# Patient Record
Sex: MALE | Race: BLACK OR AFRICAN AMERICAN | NOT HISPANIC OR LATINO | URBAN - METROPOLITAN AREA
[De-identification: names, ages, dates, MRNs, and addresses within clinical notes are randomized per-mention and may not be internally consistent; named-entity substitution may affect disease eponyms.]

---

## 2022-01-30 ENCOUNTER — EMERGENCY (EMERGENCY)
Facility: HOSPITAL | Age: 42
LOS: 0 days | Discharge: HOME | End: 2022-01-30
Attending: EMERGENCY MEDICINE
Payer: COMMERCIAL

## 2022-01-30 VITALS
OXYGEN SATURATION: 99 % | RESPIRATION RATE: 18 BRPM | TEMPERATURE: 98 F | SYSTOLIC BLOOD PRESSURE: 161 MMHG | WEIGHT: 160.06 LBS | DIASTOLIC BLOOD PRESSURE: 118 MMHG | HEART RATE: 107 BPM

## 2022-01-30 VITALS
SYSTOLIC BLOOD PRESSURE: 153 MMHG | DIASTOLIC BLOOD PRESSURE: 109 MMHG | OXYGEN SATURATION: 99 % | RESPIRATION RATE: 14 BRPM | HEART RATE: 96 BPM

## 2022-01-30 DIAGNOSIS — R56.9 UNSPECIFIED CONVULSIONS: ICD-10-CM

## 2022-01-30 DIAGNOSIS — R00.0 TACHYCARDIA, UNSPECIFIED: ICD-10-CM

## 2022-01-30 DIAGNOSIS — Z91.14 PATIENT'S OTHER NONCOMPLIANCE WITH MEDICATION REGIMEN: ICD-10-CM

## 2022-01-30 LAB
ALBUMIN SERPL ELPH-MCNC: 3.2 G/DL — LOW (ref 3.3–5)
ALP SERPL-CCNC: 121 U/L — HIGH (ref 40–120)
ALT FLD-CCNC: 54 U/L — SIGNIFICANT CHANGE UP (ref 12–78)
ANION GAP SERPL CALC-SCNC: 23 MMOL/L — HIGH (ref 5–17)
AST SERPL-CCNC: 163 U/L — HIGH (ref 15–37)
BASOPHILS # BLD AUTO: 0.02 K/UL — SIGNIFICANT CHANGE UP (ref 0–0.2)
BASOPHILS NFR BLD AUTO: 0.4 % — SIGNIFICANT CHANGE UP (ref 0–2)
BILIRUB SERPL-MCNC: 1 MG/DL — SIGNIFICANT CHANGE UP (ref 0.2–1.2)
BUN SERPL-MCNC: 8 MG/DL — SIGNIFICANT CHANGE UP (ref 7–23)
CALCIUM SERPL-MCNC: 9.2 MG/DL — SIGNIFICANT CHANGE UP (ref 8.5–10.1)
CHLORIDE SERPL-SCNC: 97 MMOL/L — SIGNIFICANT CHANGE UP (ref 96–108)
CO2 SERPL-SCNC: 15 MMOL/L — LOW (ref 22–31)
CREAT SERPL-MCNC: 1.78 MG/DL — HIGH (ref 0.5–1.3)
EOSINOPHIL # BLD AUTO: 0.03 K/UL — SIGNIFICANT CHANGE UP (ref 0–0.5)
EOSINOPHIL NFR BLD AUTO: 0.5 % — SIGNIFICANT CHANGE UP (ref 0–6)
GLUCOSE BLDC GLUCOMTR-MCNC: 211 MG/DL — HIGH (ref 70–99)
GLUCOSE SERPL-MCNC: 230 MG/DL — HIGH (ref 70–99)
HCT VFR BLD CALC: 37.2 % — LOW (ref 39–50)
HGB BLD-MCNC: 12.7 G/DL — LOW (ref 13–17)
IMM GRANULOCYTES NFR BLD AUTO: 0.2 % — SIGNIFICANT CHANGE UP (ref 0–1.5)
LYMPHOCYTES # BLD AUTO: 1.7 K/UL — SIGNIFICANT CHANGE UP (ref 1–3.3)
LYMPHOCYTES # BLD AUTO: 30.2 % — SIGNIFICANT CHANGE UP (ref 13–44)
MCHC RBC-ENTMCNC: 34.1 G/DL — SIGNIFICANT CHANGE UP (ref 32–36)
MCHC RBC-ENTMCNC: 34.1 PG — HIGH (ref 27–34)
MCV RBC AUTO: 100 FL — SIGNIFICANT CHANGE UP (ref 80–100)
MONOCYTES # BLD AUTO: 0.79 K/UL — SIGNIFICANT CHANGE UP (ref 0–0.9)
MONOCYTES NFR BLD AUTO: 14 % — SIGNIFICANT CHANGE UP (ref 2–14)
NEUTROPHILS # BLD AUTO: 3.08 K/UL — SIGNIFICANT CHANGE UP (ref 1.8–7.4)
NEUTROPHILS NFR BLD AUTO: 54.7 % — SIGNIFICANT CHANGE UP (ref 43–77)
NRBC # BLD: 0 /100 WBCS — SIGNIFICANT CHANGE UP (ref 0–0)
PLATELET # BLD AUTO: 36 K/UL — LOW (ref 150–400)
POTASSIUM SERPL-MCNC: 3.2 MMOL/L — LOW (ref 3.5–5.3)
POTASSIUM SERPL-SCNC: 3.2 MMOL/L — LOW (ref 3.5–5.3)
PROT SERPL-MCNC: 7.8 GM/DL — SIGNIFICANT CHANGE UP (ref 6–8.3)
RBC # BLD: 3.72 M/UL — LOW (ref 4.2–5.8)
RBC # FLD: 12.9 % — SIGNIFICANT CHANGE UP (ref 10.3–14.5)
SODIUM SERPL-SCNC: 135 MMOL/L — SIGNIFICANT CHANGE UP (ref 135–145)
WBC # BLD: 5.63 K/UL — SIGNIFICANT CHANGE UP (ref 3.8–10.5)
WBC # FLD AUTO: 5.63 K/UL — SIGNIFICANT CHANGE UP (ref 3.8–10.5)

## 2022-01-30 PROCEDURE — 70450 CT HEAD/BRAIN W/O DYE: CPT | Mod: 26,MA

## 2022-01-30 PROCEDURE — 99285 EMERGENCY DEPT VISIT HI MDM: CPT

## 2022-01-30 RX ORDER — POTASSIUM CHLORIDE 20 MEQ
40 PACKET (EA) ORAL ONCE
Refills: 0 | Status: COMPLETED | OUTPATIENT
Start: 2022-01-30 | End: 2022-01-30

## 2022-01-30 RX ORDER — SODIUM CHLORIDE 9 MG/ML
1000 INJECTION INTRAMUSCULAR; INTRAVENOUS; SUBCUTANEOUS ONCE
Refills: 0 | Status: COMPLETED | OUTPATIENT
Start: 2022-01-30 | End: 2022-01-30

## 2022-01-30 RX ORDER — LEVETIRACETAM 250 MG/1
1000 TABLET, FILM COATED ORAL ONCE
Refills: 0 | Status: COMPLETED | OUTPATIENT
Start: 2022-01-30 | End: 2022-01-30

## 2022-01-30 RX ADMIN — Medication 40 MILLIEQUIVALENT(S): at 12:04

## 2022-01-30 RX ADMIN — SODIUM CHLORIDE 1000 MILLILITER(S): 9 INJECTION INTRAMUSCULAR; INTRAVENOUS; SUBCUTANEOUS at 13:58

## 2022-01-30 RX ADMIN — Medication 50 MILLIGRAM(S): at 13:58

## 2022-01-30 RX ADMIN — LEVETIRACETAM 400 MILLIGRAM(S): 250 TABLET, FILM COATED ORAL at 10:39

## 2022-01-30 RX ADMIN — LEVETIRACETAM 1000 MILLIGRAM(S): 250 TABLET, FILM COATED ORAL at 11:15

## 2022-01-30 RX ADMIN — Medication 1 MILLIGRAM(S): at 10:38

## 2022-01-30 RX ADMIN — SODIUM CHLORIDE 1000 MILLILITER(S): 9 INJECTION INTRAMUSCULAR; INTRAVENOUS; SUBCUTANEOUS at 12:02

## 2022-01-30 NOTE — ED ADULT NURSE NOTE - OBJECTIVE STATEMENT
AAOx3, pt is diaphoretic, seems post-ictal making jerking movements and scared of people around him on arrival, pt now calm and cooperative. Pt sent over for seizure witnessed by family which lasted about 2 mins, denies hitting head, bloody tongue noted. Pt is non-compliant with meds (Keppra and htn) and drinks every day, last drink yesterday. Denies pain, N/V or any other complaints. Pt hypertensive and tachycardic at this time.

## 2022-01-30 NOTE — ED PROVIDER NOTE - OBJECTIVE STATEMENT
This patient is a 42 year old man BIBA with reports of seizure.  As per EMS patient has a history of seizures non-compliant with medications and alcohol abuse.  They report that on scene patient had a full bottle of Keppra that was filled in July.  Patient admitted to drinking last night.  Family told EMS that patient had a seizure and that is why they called 911.  On arrival to the ER patient stood up to sit down on the stretcher and then started yelling and flailing his arms in the air and removing his NRB mask.

## 2022-01-30 NOTE — ED ADULT NURSE NOTE - NSIMPLEMENTINTERV_GEN_ALL_ED
Implemented All Fall Risk Interventions:  [place] to call system. Call bell, personal items and telephone within reach. Instruct patient to call for assistance. Room bathroom lighting operational. Non-slip footwear when patient is off stretcher. Physically safe environment: no spills, clutter or unnecessary equipment. Stretcher in lowest position, wheels locked, appropriate side rails in place. Provide visual cue, wrist band, yellow gown, etc. Monitor gait and stability. Monitor for mental status changes and reorient to person, place, and time. Review medications for side effects contributing to fall risk. Reinforce activity limits and safety measures with patient and family.

## 2022-01-30 NOTE — ED PROVIDER NOTE - CARE PROVIDER_API CALL
Bert Jeffries)  Clinical Neurophysiology; Neurology  [address]  [place] [address]  Phone: [phone]  Fax: [phone]  Follow Up Time:

## 2022-01-30 NOTE — ED PROVIDER NOTE - NSFOLLOWUPINSTRUCTIONS_ED_ALL_ED_FT
1) DO NOT ABUSE ALCOHOL   2) Take your seizure medications as instructed  3) Follow up with your primary care doctor  4) Follow-up with neurology  5) Return to the ER for worsening or concerning symptoms

## 2022-01-30 NOTE — ED ADULT NURSE REASSESSMENT NOTE - NS ED NURSE REASSESS COMMENT FT1
received er bed 6 from previous rn remains tachycardiac ciwa reassessed and improved after ativan given as ordered on monitor seizure precautions maintained dispo pending

## 2022-01-30 NOTE — ED ADULT TRIAGE NOTE - CHIEF COMPLAINT QUOTE
BIBA as per EMS pt had 1 witnessed seizure, non compliant w/ keppra medication. Hx: HTN, ETOH. Last drink last night. Denies pain at this time.

## 2022-01-30 NOTE — ED PROVIDER NOTE - CLINICAL SUMMARY MEDICAL DECISION MAKING FREE TEXT BOX
Patient with hx of seizures non-compliant and reports of alcohol abuse- seizure today.  Likely due from non-compliance but possibly withdrawal.  Will check CIWA score, give ativan, check labs, CT and Re-eval.

## 2022-01-30 NOTE — ED PROVIDER NOTE - CPE EDP PSYCH NORM
Per patient, he did his sleep study at [address] [facility]. We haven't received sleep study results, Deb called this facility and left a voicemail message to fax the results.  
- - -

## 2022-01-30 NOTE — ED PROVIDER NOTE - PATIENT PORTAL LINK FT
You can access the FollowMyHealth Patient Portal offered by [facility] by registering at the following website: [email or web address]. By joining [facility]’s FollowMyHealth portal, you will also be able to view your health information using other applications (apps) compatible with our system.

## 2022-05-28 ENCOUNTER — INPATIENT (INPATIENT)
Facility: HOSPITAL | Age: 42
LOS: 16 days | Discharge: HOME | End: 2022-06-14
Attending: HOSPITALIST | Admitting: HOSPITALIST
Payer: MEDICAID

## 2022-05-28 VITALS
HEIGHT: 66 IN | HEART RATE: 127 BPM | TEMPERATURE: 99 F | RESPIRATION RATE: 19 BRPM | SYSTOLIC BLOOD PRESSURE: 178 MMHG | DIASTOLIC BLOOD PRESSURE: 114 MMHG | OXYGEN SATURATION: 97 % | WEIGHT: 115.08 LBS

## 2022-05-28 LAB
ALBUMIN SERPL ELPH-MCNC: 3.5 G/DL — SIGNIFICANT CHANGE UP (ref 3.3–5)
ALP SERPL-CCNC: 98 U/L — SIGNIFICANT CHANGE UP (ref 40–120)
ALT FLD-CCNC: 39 U/L — SIGNIFICANT CHANGE UP (ref 12–78)
AMPHET UR-MCNC: NEGATIVE — SIGNIFICANT CHANGE UP
ANION GAP SERPL CALC-SCNC: 17 MMOL/L — SIGNIFICANT CHANGE UP (ref 5–17)
APPEARANCE UR: ABNORMAL
AST SERPL-CCNC: 104 U/L — HIGH (ref 15–37)
BARBITURATES UR SCN-MCNC: NEGATIVE — SIGNIFICANT CHANGE UP
BASOPHILS # BLD AUTO: 0.03 K/UL — SIGNIFICANT CHANGE UP (ref 0–0.2)
BASOPHILS NFR BLD AUTO: 0.7 % — SIGNIFICANT CHANGE UP (ref 0–2)
BENZODIAZ UR-MCNC: NEGATIVE — SIGNIFICANT CHANGE UP
BILIRUB SERPL-MCNC: 0.4 MG/DL — SIGNIFICANT CHANGE UP (ref 0.2–1.2)
BILIRUB UR-MCNC: NEGATIVE — SIGNIFICANT CHANGE UP
BUN SERPL-MCNC: 11 MG/DL — SIGNIFICANT CHANGE UP (ref 7–23)
CALCIUM SERPL-MCNC: 9.4 MG/DL — SIGNIFICANT CHANGE UP (ref 8.5–10.1)
CHLORIDE SERPL-SCNC: 95 MMOL/L — LOW (ref 96–108)
CO2 SERPL-SCNC: 22 MMOL/L — SIGNIFICANT CHANGE UP (ref 22–31)
COCAINE METAB.OTHER UR-MCNC: NEGATIVE — SIGNIFICANT CHANGE UP
COLOR SPEC: YELLOW — SIGNIFICANT CHANGE UP
CREAT SERPL-MCNC: 1.79 MG/DL — HIGH (ref 0.5–1.3)
DIFF PNL FLD: ABNORMAL
EGFR: 49 ML/MIN/1.73M2 — LOW
EOSINOPHIL # BLD AUTO: 0.05 K/UL — SIGNIFICANT CHANGE UP (ref 0–0.5)
EOSINOPHIL NFR BLD AUTO: 1.2 % — SIGNIFICANT CHANGE UP (ref 0–6)
ETHANOL SERPL-MCNC: <10 MG/DL — SIGNIFICANT CHANGE UP (ref 0–10)
FLUAV AG NPH QL: SIGNIFICANT CHANGE UP
FLUBV AG NPH QL: SIGNIFICANT CHANGE UP
GLUCOSE SERPL-MCNC: 119 MG/DL — HIGH (ref 70–99)
GLUCOSE UR QL: NEGATIVE MG/DL — SIGNIFICANT CHANGE UP
HCT VFR BLD CALC: 37.3 % — LOW (ref 39–50)
HGB BLD-MCNC: 12.9 G/DL — LOW (ref 13–17)
IMM GRANULOCYTES NFR BLD AUTO: 0.2 % — SIGNIFICANT CHANGE UP (ref 0–1.5)
KETONES UR-MCNC: ABNORMAL
LEUKOCYTE ESTERASE UR-ACNC: ABNORMAL
LYMPHOCYTES # BLD AUTO: 1.07 K/UL — SIGNIFICANT CHANGE UP (ref 1–3.3)
LYMPHOCYTES # BLD AUTO: 25.8 % — SIGNIFICANT CHANGE UP (ref 13–44)
MCHC RBC-ENTMCNC: 32.7 PG — SIGNIFICANT CHANGE UP (ref 27–34)
MCHC RBC-ENTMCNC: 34.6 G/DL — SIGNIFICANT CHANGE UP (ref 32–36)
MCV RBC AUTO: 94.4 FL — SIGNIFICANT CHANGE UP (ref 80–100)
METHADONE UR-MCNC: NEGATIVE — SIGNIFICANT CHANGE UP
MONOCYTES # BLD AUTO: 0.52 K/UL — SIGNIFICANT CHANGE UP (ref 0–0.9)
MONOCYTES NFR BLD AUTO: 12.6 % — SIGNIFICANT CHANGE UP (ref 2–14)
NEUTROPHILS # BLD AUTO: 2.46 K/UL — SIGNIFICANT CHANGE UP (ref 1.8–7.4)
NEUTROPHILS NFR BLD AUTO: 59.5 % — SIGNIFICANT CHANGE UP (ref 43–77)
NITRITE UR-MCNC: NEGATIVE — SIGNIFICANT CHANGE UP
NRBC # BLD: 0 /100 WBCS — SIGNIFICANT CHANGE UP (ref 0–0)
OPIATES UR-MCNC: NEGATIVE — SIGNIFICANT CHANGE UP
PCP SPEC-MCNC: SIGNIFICANT CHANGE UP
PCP UR-MCNC: NEGATIVE — SIGNIFICANT CHANGE UP
PH UR: 6 — SIGNIFICANT CHANGE UP (ref 5–8)
PLATELET # BLD AUTO: 71 K/UL — LOW (ref 150–400)
POTASSIUM SERPL-MCNC: 3.9 MMOL/L — SIGNIFICANT CHANGE UP (ref 3.5–5.3)
POTASSIUM SERPL-SCNC: 3.9 MMOL/L — SIGNIFICANT CHANGE UP (ref 3.5–5.3)
PROT SERPL-MCNC: 8.2 GM/DL — SIGNIFICANT CHANGE UP (ref 6–8.3)
PROT UR-MCNC: 500 MG/DL
RBC # BLD: 3.95 M/UL — LOW (ref 4.2–5.8)
RBC # FLD: 14.1 % — SIGNIFICANT CHANGE UP (ref 10.3–14.5)
SARS-COV-2 RNA SPEC QL NAA+PROBE: SIGNIFICANT CHANGE UP
SODIUM SERPL-SCNC: 134 MMOL/L — LOW (ref 135–145)
SP GR SPEC: 1.01 — SIGNIFICANT CHANGE UP (ref 1.01–1.02)
THC UR QL: NEGATIVE — SIGNIFICANT CHANGE UP
UROBILINOGEN FLD QL: 1 MG/DL
WBC # BLD: 4.14 K/UL — SIGNIFICANT CHANGE UP (ref 3.8–10.5)
WBC # FLD AUTO: 4.14 K/UL — SIGNIFICANT CHANGE UP (ref 3.8–10.5)

## 2022-05-28 PROCEDURE — 93010 ELECTROCARDIOGRAM REPORT: CPT

## 2022-05-28 PROCEDURE — 70450 CT HEAD/BRAIN W/O DYE: CPT | Mod: 26,MA

## 2022-05-28 PROCEDURE — 99285 EMERGENCY DEPT VISIT HI MDM: CPT

## 2022-05-28 PROCEDURE — 99222 1ST HOSP IP/OBS MODERATE 55: CPT

## 2022-05-28 PROCEDURE — 71045 X-RAY EXAM CHEST 1 VIEW: CPT | Mod: 26

## 2022-05-28 PROCEDURE — 72125 CT NECK SPINE W/O DYE: CPT | Mod: 26,MA

## 2022-05-28 RX ORDER — SODIUM CHLORIDE 9 MG/ML
1000 INJECTION INTRAMUSCULAR; INTRAVENOUS; SUBCUTANEOUS
Refills: 0 | Status: DISCONTINUED | OUTPATIENT
Start: 2022-05-28 | End: 2022-05-30

## 2022-05-28 RX ORDER — METOPROLOL TARTRATE 50 MG
5 TABLET ORAL ONCE
Refills: 0 | Status: COMPLETED | OUTPATIENT
Start: 2022-05-28 | End: 2022-05-28

## 2022-05-28 RX ORDER — LEVETIRACETAM 250 MG/1
1000 TABLET, FILM COATED ORAL
Refills: 0 | Status: DISCONTINUED | OUTPATIENT
Start: 2022-05-28 | End: 2022-05-28

## 2022-05-28 RX ORDER — TETANUS TOXOID, REDUCED DIPHTHERIA TOXOID AND ACELLULAR PERTUSSIS VACCINE, ADSORBED 5; 2.5; 8; 8; 2.5 [IU]/.5ML; [IU]/.5ML; UG/.5ML; UG/.5ML; UG/.5ML
0.5 SUSPENSION INTRAMUSCULAR ONCE
Refills: 0 | Status: COMPLETED | OUTPATIENT
Start: 2022-05-28 | End: 2022-05-28

## 2022-05-28 RX ORDER — ONDANSETRON 8 MG/1
4 TABLET, FILM COATED ORAL EVERY 8 HOURS
Refills: 0 | Status: DISCONTINUED | OUTPATIENT
Start: 2022-05-28 | End: 2022-06-06

## 2022-05-28 RX ORDER — LEVETIRACETAM 250 MG/1
1000 TABLET, FILM COATED ORAL EVERY 12 HOURS
Refills: 0 | Status: DISCONTINUED | OUTPATIENT
Start: 2022-05-28 | End: 2022-05-29

## 2022-05-28 RX ORDER — ACETAMINOPHEN 500 MG
650 TABLET ORAL EVERY 6 HOURS
Refills: 0 | Status: DISCONTINUED | OUTPATIENT
Start: 2022-05-28 | End: 2022-06-03

## 2022-05-28 RX ADMIN — TETANUS TOXOID, REDUCED DIPHTHERIA TOXOID AND ACELLULAR PERTUSSIS VACCINE, ADSORBED 0.5 MILLILITER(S): 5; 2.5; 8; 8; 2.5 SUSPENSION INTRAMUSCULAR at 20:10

## 2022-05-28 RX ADMIN — Medication 5 MILLIGRAM(S): at 22:22

## 2022-05-28 RX ADMIN — LEVETIRACETAM 400 MILLIGRAM(S): 250 TABLET, FILM COATED ORAL at 20:10

## 2022-05-28 NOTE — ED ADULT NURSE NOTE - OBJECTIVE STATEMENT
patient is A&Ox4. Breathing unlabored on RA. On cardiac monitor. Patient BIBEMS. As per EMS, c/o seizure activity x 30 minutes witness by bystander. Patient states he was walking on the street and felt dizzy. reports shaking and unsteady gait before seizure. Patient noted with right head hematoma, a laceration of right top of eyebrow. Patient noted with R elbow abrasion and Left hand knuckle abrasion. Patient non-complaint with seizure medications. Denies sob, difficulty breathing, n/v/d, blurred vision, headache. Denies any symptoms at this time.

## 2022-05-28 NOTE — ED PROVIDER NOTE - PROGRESS NOTE DETAILS
pt's bp elevated and tachy, ?alcohol use, pt initially stated that he drinks occasionally but when asked again by the nurse, he states that he drinks often, ciwa score is 4, pt last draink two days ago, drank three bottles of guiness, pt states that he has seizures even when he does not drink, he also reports hearing voices and seeing ghosts which has been since he was young, he was never seen by a psychiatrist stating that he is from [place] and they don't believe in such thngs in [place] country

## 2022-05-28 NOTE — ED ADULT NURSE REASSESSMENT NOTE - NS ED NURSE REASSESS COMMENT FT1
patient is A&Ox4. Breathing unlabored on RA. patient resting comfortably in stretcher. On cardiac monitor. No acute or respiratory distress noted. Patient /108. Dr. Schofield notified. No further orders at this time. Fall and safety precautions maintained.

## 2022-05-28 NOTE — ED ADULT NURSE NOTE - ED STAT RN HANDOFF DETAILS 2
Assuming patient's care for coverage. Report received from ROMA Dougherty and patient informed during rounding. Assessment available on KBC. Will continue to monitor. on cardiac monitor at bedside. IVF infusing. pt's BP elevated and Dr. Schofield is aware. seizure precautions intact. awaiting to give report to floor

## 2022-05-28 NOTE — ED ADULT NURSE REASSESSMENT NOTE - NS ED NURSE REASSESS COMMENT FT1
report given to Ladonna allen RN. patient needs stat monitor , padded railing , nursing note. following RN made aware

## 2022-05-28 NOTE — H&P ADULT - NSHPPHYSICALEXAM_GEN_ALL_CORE
PHYSICAL EXAM:    Vital Signs Last 24 Hrs  T(C): 37.1 (29 May 2022 02:24), Max: 37.1 (28 May 2022 18:47)  T(F): 98.7 (29 May 2022 02:24), Max: 98.7 (28 May 2022 18:47)  HR: 81 (29 May 2022 02:54) (81 - 127)  BP: 156/113 (29 May 2022 02:54) (152/108 - 178/114)  BP(mean): --  RR: 14 (29 May 2022 02:54) (12 - 20)  SpO2: 97% (29 May 2022 02:54) (96% - 100%)    GENERAL: Pt lying in bed comfortably in NAD  HEENT:  Atraumatic, EOMI, PERRL, conjunctiva and sclera clear, MMM  NECK: Supple, No JVD  CHEST/LUNG: Clear to auscultation bilaterally; No rales, rhonchi, wheezing or rubs. Unlabored respirations  HEART: Regular rate and rhythm; No murmurs, rubs, or gallops  ABDOMEN: Bowel sounds present; Soft, Nontender, Nondistended. No guarding or rigidity    EXTREMITIES:  2+ Peripheral Pulses, brisk capillary refill. No clubbing, cyanosis, or edema  NEUROLOGICAL:  Alert & Oriented X3, speech clear. Answers questions appropriately. Full and equal strength B/L upper and lower extremities. No deficits   MSK: FROM x 4 extremities   SKIN: No rashes or lesions PHYSICAL EXAM:    Vital Signs Last 24 Hrs  T(C): 37.1 (29 May 2022 02:24), Max: 37.1 (28 May 2022 18:47)  T(F): 98.7 (29 May 2022 02:24), Max: 98.7 (28 May 2022 18:47)  HR: 81 (29 May 2022 02:54) (81 - 127)  BP: 156/113 (29 May 2022 02:54) (152/108 - 178/114)  BP(mean): --  RR: 14 (29 May 2022 02:54) (12 - 20)  SpO2: 97% (29 May 2022 02:54) (96% - 100%)    GENERAL: Pt lying in bed comfortably in NAD  HEENT:  R frontal hematoma, abrasions, EOMI, PERRL, conjunctiva mildly injected, dry MM  NECK: Supple  CHEST/LUNG: Clear to auscultation bilaterally  HEART: Regular rate and rhythm  ABDOMEN: Bowel sounds present; Soft, Nontender, Nondistended.   EXTREMITIES:  2+ Peripheral Pulses, brisk capillary refill. No edema  NEUROLOGICAL:  Alert & Oriented X3,  MSK: FROM x 4 extremities   SKIN: some bumps, bruises, abrasions.

## 2022-05-28 NOTE — ED ADULT NURSE NOTE - ED STAT RN HANDOFF DETAILS
Report received from Tomás BRANDON. Breathing unlabored on RA. On cardiac monitor. No acute or respiratory distress noted. Fall and safety precautions maintained.

## 2022-05-28 NOTE — ED ADULT NURSE REASSESSMENT NOTE - NS ED NURSE REASSESS COMMENT FT1
Upon CIWA assessment, patient complaining of auditory and visual hallucinations. States he recently started hearing voices. Denies voices trying to tell him to hurt himself or anyone else. Denies voices telling him to do anything violent. States "I see ghosts sometimes". Dr. Velasquez notified.

## 2022-05-28 NOTE — ED PROVIDER NOTE - OBJECTIVE STATEMENT
42 year old male with h/o HTN and seizures (currently not on medications) presents biba s/p seizure, pt states that he was walking outside when he did become dizzy, felt himself become unsteady and shaking, pt does not recall what occurred after that, as per EMS a bystander who witnessed  his seizure activity call for help, pt presents with left sided tongue bite, right facial abrasion and right elbow abrasion, pt denies headache or dizziness in the ER, (-) neck or back pain (-) chest pain or sob

## 2022-05-28 NOTE — H&P ADULT - HISTORY OF PRESENT ILLNESS
42 year old male with h/o HTN, seizures, alcohol abuse (per ED visit in Jan however pt denies) presents biba s/p seizure. Pt reports while walking outside his hand and R leg starting shaking and he felt dizzy (typical for him prior to seizures; which he has about 4x/yr); the next thing he recalls was seeing EMS. Per EMS, a bystander who witnessed  his seizure activity called for help; pt noted w/ left sided tongue bite, right facial hematoma/abrasion. Pt very vague on how much he drinks, reports he last drink 2 beers 3 days ago, states only drinks more when he "parties" and denies recent partying. Pt reports he was on medications for HTN and Keppra however states no one refilled them and he never inquired if he was supposed to continue.    In ED initial vitals /114,  rest of vitals stable. Labs sig for plt 71, Na 134, Cr 1.79, see below for rest of labs. CT head/C-spine Right frontal scalp hematoma without foreign body or fracture. No acute intracranial bleeding. Volume loss, greater than expected for patient's age. Finding may be   related to history of seizure and cranioplasty medication.

## 2022-05-28 NOTE — H&P ADULT - NSHPLABSRESULTS_GEN_ALL_CORE
T(C): 37.1 (22 @ 02:24), Max: 37.1 (22 @ 18:47)  HR: 81 (22 @ 02:54) (81 - 127)  BP: 156/113 (22 @ 02:54) (152/108 - 178/114)  RR: 14 (22 @ 02:54) (12 - 20)  SpO2: 97% (22 @ 02:54) (96% - 100%)                        12.9   4.14  )-----------( 71       ( 28 May 2022 20:44 )             37.3         134<L>  |  95<L>  |  11  ----------------------------<  119<H>  3.9   |  22  |  1.79<H>    Ca    9.4      28 May 2022 20:44    TPro  8.2  /  Alb  3.5  /  TBili  0.4  /  DBili  x   /  AST  104<H>  /  ALT  39  /  AlkPhos  98      LIVER FUNCTIONS - ( 28 May 2022 20:44 )  Alb: 3.5 g/dL / Pro: 8.2 gm/dL / ALK PHOS: 98 U/L / ALT: 39 U/L / AST: 104 U/L / GGT: x             Urinalysis Basic - ( 28 May 2022 23:00 )    Color: Yellow / Appearance: very cloudy / S.015 / pH: x  Gluc: x / Ketone: Trace  / Bili: Negative / Urobili: 1 mg/dL   Blood: x / Protein: 500 mg/dL / Nitrite: Negative   Leuk Esterase: Trace / RBC: 11-25 /HPF / WBC 3-5   Sq Epi: x / Non Sq Epi: x / Bacteria: Many      < from: CT Head No Cont (22 @ 20:55) >    IMPRESSION:    CT BRAIN: Right frontal scalp hematoma without foreign body or fracture.   No acute intracranial bleeding.    Volume loss, greater than expected for patient's age. Finding may be   related to history of seizure and cranioplasty medication.    CT CERVICAL SPINE: No fracture.    < end of copied text >    EKG - Sinus tachycardia     acetaminophen     Tablet .. 650 milliGRAM(s) Oral every 6 hours PRN  hydrochlorothiazide 25 milliGRAM(s) Oral daily  levETIRAcetam  IVPB 1000 milliGRAM(s) IV Intermittent every 12 hours  LORazepam   Injectable 1 milliGRAM(s) IV Push every 2 hours PRN  LORazepam   Injectable 2 milliGRAM(s) IV Push every 1 hour PRN  ondansetron Injectable 4 milliGRAM(s) IV Push every 8 hours PRN  sodium chloride 0.9%. 1000 milliLiter(s) IV Continuous <Continuous>

## 2022-05-28 NOTE — ED ADULT TRIAGE NOTE - CHIEF COMPLAINT QUOTE
Pt biba with c/o seizure activity x 30 minutes witness by bystander. pt pw R side of head and R hand abrasion from the fall . h/o seizures, not complaint with medication

## 2022-05-28 NOTE — H&P ADULT - ASSESSMENT
42 year old male with h/o HTN, seizures, alcohol abuse (per ED visit in Jan however pt denies) presents biba s/p seizure.    1) Seizure  -      42 year old male with h/o HTN, seizures, alcohol abuse (per ED visit in Jan however pt denies) presents biba s/p seizure.    1) Seizure  - unclear if sequelae of etoh w/drawal, pt reports he minimal drinking 3 days ago when asked to go back further answers are vague. High susp given low plt  - place on CIWA protocol for now w/ prn IV Ativan  - IVF  - pt was placed on keppra in the past, he just never followed up  - start Keppra 500mg PO BID  - EEG   - Neuro eval in am, pls call  - seizure precautions    2) HTN  - start HCTZ  - cont to monitor    3) MARTY, mild hyponatremia, dehydration  - c/w IVF    4) Thrombocytopenia  - Chronic  - high susp likely related to alcohol abuse  - cont to monitor    5) DVT ppx - low risk, SCDs,pt also thrombocytopenic

## 2022-05-29 PROBLEM — R56.9 UNSPECIFIED CONVULSIONS: Chronic | Status: ACTIVE | Noted: 2022-01-30

## 2022-05-29 LAB
ALBUMIN SERPL ELPH-MCNC: 3.1 G/DL — LOW (ref 3.3–5)
ALP SERPL-CCNC: 86 U/L — SIGNIFICANT CHANGE UP (ref 40–120)
ALT FLD-CCNC: 32 U/L — SIGNIFICANT CHANGE UP (ref 12–78)
ANION GAP SERPL CALC-SCNC: 11 MMOL/L — SIGNIFICANT CHANGE UP (ref 5–17)
AST SERPL-CCNC: 70 U/L — HIGH (ref 15–37)
BACTERIA # UR AUTO: ABNORMAL
BILIRUB SERPL-MCNC: 0.6 MG/DL — SIGNIFICANT CHANGE UP (ref 0.2–1.2)
BUN SERPL-MCNC: 10 MG/DL — SIGNIFICANT CHANGE UP (ref 7–23)
CALCIUM SERPL-MCNC: 8.8 MG/DL — SIGNIFICANT CHANGE UP (ref 8.5–10.1)
CHLORIDE SERPL-SCNC: 100 MMOL/L — SIGNIFICANT CHANGE UP (ref 96–108)
CK SERPL-CCNC: 398 U/L — HIGH (ref 26–308)
CO2 SERPL-SCNC: 26 MMOL/L — SIGNIFICANT CHANGE UP (ref 22–31)
CREAT SERPL-MCNC: 1.16 MG/DL — SIGNIFICANT CHANGE UP (ref 0.5–1.3)
EGFR: 82 ML/MIN/1.73M2 — SIGNIFICANT CHANGE UP
GLUCOSE SERPL-MCNC: 78 MG/DL — SIGNIFICANT CHANGE UP (ref 70–99)
HCT VFR BLD CALC: 35 % — LOW (ref 39–50)
HGB BLD-MCNC: 12.2 G/DL — LOW (ref 13–17)
MCHC RBC-ENTMCNC: 32.7 PG — SIGNIFICANT CHANGE UP (ref 27–34)
MCHC RBC-ENTMCNC: 34.9 G/DL — SIGNIFICANT CHANGE UP (ref 32–36)
MCV RBC AUTO: 93.8 FL — SIGNIFICANT CHANGE UP (ref 80–100)
NRBC # BLD: 0 /100 WBCS — SIGNIFICANT CHANGE UP (ref 0–0)
PLATELET # BLD AUTO: 60 K/UL — LOW (ref 150–400)
POTASSIUM SERPL-MCNC: 3.8 MMOL/L — SIGNIFICANT CHANGE UP (ref 3.5–5.3)
POTASSIUM SERPL-SCNC: 3.8 MMOL/L — SIGNIFICANT CHANGE UP (ref 3.5–5.3)
PROT SERPL-MCNC: 7.2 GM/DL — SIGNIFICANT CHANGE UP (ref 6–8.3)
RBC # BLD: 3.73 M/UL — LOW (ref 4.2–5.8)
RBC # FLD: 14.1 % — SIGNIFICANT CHANGE UP (ref 10.3–14.5)
RBC CASTS # UR COMP ASSIST: ABNORMAL /HPF (ref 0–4)
SODIUM SERPL-SCNC: 137 MMOL/L — SIGNIFICANT CHANGE UP (ref 135–145)
WBC # BLD: 3.56 K/UL — LOW (ref 3.8–10.5)
WBC # FLD AUTO: 3.56 K/UL — LOW (ref 3.8–10.5)
WBC UR QL: SIGNIFICANT CHANGE UP

## 2022-05-29 PROCEDURE — 73700 CT LOWER EXTREMITY W/O DYE: CPT | Mod: 26,LT,76

## 2022-05-29 PROCEDURE — 95816 EEG AWAKE AND DROWSY: CPT | Mod: 26

## 2022-05-29 PROCEDURE — 99233 SBSQ HOSP IP/OBS HIGH 50: CPT

## 2022-05-29 RX ORDER — HYDROCHLOROTHIAZIDE 25 MG
25 TABLET ORAL ONCE
Refills: 0 | Status: COMPLETED | OUTPATIENT
Start: 2022-05-29 | End: 2022-05-29

## 2022-05-29 RX ORDER — INFLUENZA VIRUS VACCINE 15; 15; 15; 15 UG/.5ML; UG/.5ML; UG/.5ML; UG/.5ML
0.5 SUSPENSION INTRAMUSCULAR ONCE
Refills: 0 | Status: COMPLETED | OUTPATIENT
Start: 2022-05-29 | End: 2022-05-29

## 2022-05-29 RX ORDER — FOLIC ACID 0.8 MG
1 TABLET ORAL DAILY
Refills: 0 | Status: ACTIVE | OUTPATIENT
Start: 2022-05-29 | End: 2023-04-27

## 2022-05-29 RX ORDER — HYDROCHLOROTHIAZIDE 25 MG
25 TABLET ORAL DAILY
Refills: 0 | Status: DISCONTINUED | OUTPATIENT
Start: 2022-05-29 | End: 2022-05-29

## 2022-05-29 RX ORDER — LEVETIRACETAM 250 MG/1
500 TABLET, FILM COATED ORAL
Refills: 0 | Status: DISCONTINUED | OUTPATIENT
Start: 2022-05-29 | End: 2022-05-30

## 2022-05-29 RX ORDER — THIAMINE MONONITRATE (VIT B1) 100 MG
100 TABLET ORAL DAILY
Refills: 0 | Status: ACTIVE | OUTPATIENT
Start: 2022-05-29 | End: 2023-04-27

## 2022-05-29 RX ADMIN — SODIUM CHLORIDE 150 MILLILITER(S): 9 INJECTION INTRAMUSCULAR; INTRAVENOUS; SUBCUTANEOUS at 21:42

## 2022-05-29 RX ADMIN — Medication 100 MILLIGRAM(S): at 18:46

## 2022-05-29 RX ADMIN — SODIUM CHLORIDE 150 MILLILITER(S): 9 INJECTION INTRAMUSCULAR; INTRAVENOUS; SUBCUTANEOUS at 00:45

## 2022-05-29 RX ADMIN — SODIUM CHLORIDE 150 MILLILITER(S): 9 INJECTION INTRAMUSCULAR; INTRAVENOUS; SUBCUTANEOUS at 06:37

## 2022-05-29 RX ADMIN — Medication 50 MILLIGRAM(S): at 00:45

## 2022-05-29 RX ADMIN — Medication 1 TABLET(S): at 18:47

## 2022-05-29 RX ADMIN — Medication 1 MILLIGRAM(S): at 18:47

## 2022-05-29 RX ADMIN — LEVETIRACETAM 400 MILLIGRAM(S): 250 TABLET, FILM COATED ORAL at 06:25

## 2022-05-29 RX ADMIN — LEVETIRACETAM 500 MILLIGRAM(S): 250 TABLET, FILM COATED ORAL at 18:48

## 2022-05-29 RX ADMIN — Medication 25 MILLIGRAM(S): at 02:55

## 2022-05-29 RX ADMIN — Medication 25 MILLIGRAM(S): at 06:25

## 2022-05-29 NOTE — PATIENT PROFILE ADULT - NSPROPOAPRESSUREINJURY_GEN_A_NUR
-- DO NOT REPLY / DO NOT REPLY ALL --  -- Message is from the [facility]--    COVID-19 Universal Screening: Negative    General Patient Message      Reason for Call: Patient called stating that he is experiencing jaw pain isn't sure if its regarding tooth patient wants to know can he prescribed antibiotics from PCP and stated to please send to [facility] in [facility] and to please call to discuss at your convenience      Caller Information       Type Contact Phone    04/16/2020 12:30 PM Phone (Incoming) Esteban Crespo (Self) [phone] (M)          Alternative phone number: None    Turnaround time given to caller:   \"This message will be sent to [state Provider's name]. The clinical team will fulfill your request as soon as they review your message.\"    
Talked with patient, informed him that dr. Atkins was on vacation,  I suggested that he will need to be evaluated in [facility].  To find out if he have an infection.  Also informed him that he may need to reach out to dentist if it his tooth.  Pt requested to talked with Filomena.  She was informed him will call pt back.  
rn spoke with pt made phone apt for today  
no

## 2022-05-29 NOTE — PROGRESS NOTE ADULT - ASSESSMENT
42 year old male with h/o HTN, seizures, alcohol abuse (per ED visit in Jan however pt denies) presents biba s/p seizure.    Assessment and Plan:   Breakthrough Seizure sec to Keppra noncompliance and ETOH use   history of cranioplasty   - place on CIWA protocol for now w/ prn IV Ativan for symptom trigger  Utox and ETOH level neg   CT head and c-spine: no acute findings   CXR clear   continue with  Keppra 500mg PO BID  - EEG   -Neuro consult   - seizure precautions    Left foot/ankle pain post fall   -CT of left ankle/foot to r/o Fx     HTN    MARTY, mild hyponatremia, dehydration  - c/w IVF  Cr improved        Thrombocytopenia  - Chronic  - high susp likely related to alcohol abuse  - cont to monitor    Preventative measures    DVT ppx - low risk, SCDs,pt also thrombocytopenic  fall precautions

## 2022-05-29 NOTE — EEG REPORT - NS EEG TEXT BOX
REPORT OF ROUTINE EEG WITH VIDEO  [facility]: [address], [address] [place] [address], Phone: [phone] [facility]: [address] [place], [place] [address], [place] [address], Phone: [phone] Office: [address] [place] [address], [place] [address], [place] [address], Phone: [phone]  Patient Name: SYDNEE GREEN   Age: 42 year : 1980 MRN #: -, Location: 2D 265 D Referring Physician: ROSALINDA TRAORE  EEG #:  D Study Date: 2022   Start Time: 10:50:32 AM    Study Duration: 20.9 		  Technical Information:					 On Instrument: - Placement and Labeling of Electrodes: The EEG was performed utilizing 20 channels referential EEG connections (coronal over temporal over parasagittal montage) using all standard 10-20 electrode placements with EKG.  Recording was at a sampling rate of 256 samples per second per channel.  Time synchronized digital video recording was done simultaneously with EEG recording.  A low light infrared camera was used for low light recording.  Huy and seizure detection algorithms were utilized. CSA Technical Component: Quantitative EEG analysis using a separate Compressed Spectral Array (CSA) software package was conducted in real-time and run at bedside after set up by the technician, digitally displaying the power of electrographic frequencies included in the 1-30Hz band using a graded color map.  This data was reviewed and interpreted independently, and is reported in a separate section below.  History:  ROUTINE PERFORMED IN LAB COR AWAKE/DROWSY/ALERTX3 NO HV DUE COVID PROTOCOL PHOTIC PERFORMED  41 Y/O MALE P/W SEIZURE LIKE ACTIVITY PMH HTN/ALCOHOL ABUSE EVALUATING BRAIN ACTIVITY  Medication Keppra (Levetiracetam) Zofran Ativan (Lorazepam) Librium  Study Interpretation:  FINDINGS:  The background was continuous, spontaneously variable and reactive.  During wakefulness, the posteriorly dominant rhythm consisted of symmetric, well modulated 10 Hz activity, with an amplitude to 30 uV, that attenuated to eye opening.  Low amplitude central beta was noted in wakefulness.  Background Slowing: Generalized slowing: none was present. Focal slowing: none was present.  Sleep Background: Stage II sleep transients were not recorded.  Non-epileptiform activity: None.  Epileptiform Activity:  No epileptiform discharges were present.  Events: No clinical events were recorded. No seizures were recorded.  Activation Procedures:  -Hyperventilation was not performed.   -Photic stimulation was performed and did not elicit any abnormalities.    Artifacts: Intermittent myogenic and movement artifacts were noted.  ECG: The heart rate on single channel ECG was predominantly between  BPM.  EEG Classification / Summary:  Normal EEG in the awake states.  Clinical Impression:  Normal study. No epileptiform pattern or seizure seen.  Preliminary Fellow Report, final report pending attending review  Richmond Patel MD Epilepsy Fellow  Reading Room: [phone] On Call Service After Hours: [phone]    REPORT OF ROUTINE EEG WITH VIDEO  [facility]: [address], [address] [place] [address], Phone: [phone] [facility]: [address] [place], [place] [address], [place] [address], Phone: [phone] Office: [address] [place] [address], [place] [address], [place] [address], Phone: [phone]  Patient Name: SYDNEE GREEN   Age: 42 year : 1980 MRN #: -, Location: 2D 265 D Referring Physician: ROSALINDA TRAORE  EEG #:  D Study Date: 2022   Start Time: 10:50:32 AM    Study Duration: 20.9 		  Technical Information:					 On Instrument: - Placement and Labeling of Electrodes: The EEG was performed utilizing 20 channels referential EEG connections (coronal over temporal over parasagittal montage) using all standard 10-20 electrode placements with EKG.  Recording was at a sampling rate of 256 samples per second per channel.  Time synchronized digital video recording was done simultaneously with EEG recording.  A low light infrared camera was used for low light recording.  Huy and seizure detection algorithms were utilized. CSA Technical Component: Quantitative EEG analysis using a separate Compressed Spectral Array (CSA) software package was conducted in real-time and run at bedside after set up by the technician, digitally displaying the power of electrographic frequencies included in the 1-30Hz band using a graded color map.  This data was reviewed and interpreted independently, and is reported in a separate section below.  History:  ROUTINE PERFORMED IN LAB COR AWAKE/DROWSY/ALERTX3 NO HV DUE COVID PROTOCOL PHOTIC PERFORMED  41 Y/O MALE P/W SEIZURE LIKE ACTIVITY PMH HTN/ALCOHOL ABUSE EVALUATING BRAIN ACTIVITY  Medication Keppra (Levetiracetam) Zofran Ativan (Lorazepam) Librium  Study Interpretation:  FINDINGS:  The background was continuous, spontaneously variable and reactive.  During wakefulness, the posteriorly dominant rhythm consisted of symmetric, well modulated 10 Hz activity, with an amplitude to 30 uV, that attenuated to eye opening.  Low amplitude central beta was noted in wakefulness.  Background Slowing: Generalized slowing: none was present. Focal slowing: none was present.  Sleep Background: Stage II sleep transients were not recorded.  Non-epileptiform activity: None.  Epileptiform Activity:  No epileptiform discharges were present.  Events: No clinical events were recorded. No seizures were recorded.  Activation Procedures:  -Hyperventilation was not performed.   -Photic stimulation was performed and did not elicit any abnormalities.    Artifacts: Intermittent myogenic and movement artifacts were noted.  ECG: The heart rate on single channel ECG was predominantly between  BPM.  EEG Classification / Summary:  Normal EEG in the awake states.  Clinical Impression:  Normal study. No epileptiform pattern or seizure seen.  Richmond Patel MD Epilepsy Fellow  Sebastian Briseno MD PhD Director, Epilepsy Division, [facility] EEG Reading Room Ph#: [phone] Epilepsy Answering Service after 5PM and before 8:30AM: Ph#: [phone]

## 2022-05-29 NOTE — PATIENT PROFILE ADULT - FALL HARM RISK - HARM RISK INTERVENTIONS
Assistance with ambulation/Assistance OOB with selected safe patient handling equipment/Communicate Risk of Fall with Harm to all staff/Discuss with provider need for PT consult/Monitor for mental status changes/Monitor gait and stability/Provide patient with walking aids - walker, cane, crutches/Reinforce activity limits and safety measures with patient and family/Tailored Fall Risk Interventions/Toileting schedule using arm’s reach rule for commode and bathroom/Use of alarms - bed, chair and/or voice tab/Visual Cue: Yellow wristband and red socks/Bed in lowest position, wheels locked, appropriate side rails in place/Call bell, personal items and telephone in reach/Instruct patient to call for assistance before getting out of bed or chair/Non-slip footwear when patient is out of bed/[place] to call system/Physically safe environment - no spills, clutter or unnecessary equipment/Purposeful Proactive Rounding/Room/bathroom lighting operational, light cord in reach

## 2022-05-29 NOTE — PROGRESS NOTE ADULT - SUBJECTIVE AND OBJECTIVE BOX
HPI:          42 year old male with h/o HTN, seizures, alcohol abuse (per ED visit in  however pt denies) presents biba s/p seizure. Pt reports while walking outside his hand and R leg starting shaking and he felt dizzy (typical for him prior to seizures; which he has about 4x/yr); the next thing he recalls was seeing EMS. Per EMS, a bystander who witnessed  his seizure activity called for help; pt noted w/ left sided tongue bite, right facial hematoma/abrasion. Pt very vague on how much he drinks, reports he last drink 2 beers 3 days ago, states only drinks more when he "parties" and denies recent partying. Pt reports he was on medications for HTN and Keppra however states no one refilled them and he never inquired if he was supposed to continue.    In ED initial vitals /114,  rest of vitals stable. Labs sig for plt 71, Na 134, Cr 1.79, see below for rest of labs. CT head/C-spine Right frontal scalp hematoma without foreign body or fracture. No acute intracranial bleeding. Volume loss, greater than expected for patient's age. Finding may be   related to history of seizure and cranioplasty medication. (28 May 2022 23:19)      SUBJECTIVE & OBJECTIVE:   Pt seen and examined at bedside 5/29 AM   no overnight events.   complaining of left foot pain , unable to weight bear   states hasn't taken his Keppra in 1 month because there was no Rx , doesn't remember the last visit to his PMD     Denies fever, chills, N/V, dizziness, HA, cough, CP, palpitations, SOB, abdominal pain, dysuria, diarrhea, constipation.         PHYSICAL EXAM:  Vitas stable.         GENERAL: NAD,  no increased WOB  HEAD:  right forehead  hematoma/abrasion  EYES: EOMI, PERRLA, conjunctiva and sclera clear  ENMT: Moist mucous membranes, left side tongue bite   NECK: Supple, No JVD  NERVOUS SYSTEM:  Alert & Oriented X3, no focal neuro deficits   CHEST/LUNG: Clear to auscultation bilaterally; No rales, rhonchi, wheezing, or rubs  HEART: Regular rate and rhythm; No murmurs, rubs, or gallops  ABDOMEN: Soft, Nontender, Nondistended; Bowel sounds present  EXTREMITIES:  2+ Peripheral Pulses b/l, No clubbing, cyanosis, calf tenderness or edema b/l  left foot and calf tenderness to palpation with limited ROM d/t pain     MEDICATIONS  (STANDING):  chlorhexidine 4% Liquid 1 Application(s) Topical <User Schedule>  dexMEDEtomidine Infusion 0.2 MICROgram(s)/kG/Hr (2.83 mL/Hr) IV Continuous <Continuous>  folic acid 1 milliGRAM(s) Oral daily  lactated ringers. 1000 milliLiter(s) (100 mL/Hr) IV Continuous <Continuous>  levETIRAcetam 500 milliGRAM(s) Oral two times a day  multivitamin 1 Tablet(s) Oral daily  PHENobarbital Injectable 130 milliGRAM(s) IV Push every 6 hours  potassium chloride  10 mEq/100 mL IVPB 10 milliEquivalent(s) IV Intermittent every 1 hour  thiamine 100 milliGRAM(s) Oral daily    MEDICATIONS  (PRN):  acetaminophen     Tablet .. 650 milliGRAM(s) Oral every 6 hours PRN Temp greater or equal to 38C (100.4F), Mild Pain (1 - 3)  ondansetron Injectable 4 milliGRAM(s) IV Push every 8 hours PRN Nausea and/or Vomiting      LABS:                        10.9   5.51  )-----------( 56       ( 30 May 2022 04:56 )             31.1     05-30    138  |  100  |  10  ----------------------------<  112<H>  3.3<L>   |  26  |  0.90    Ca    8.8      30 May 2022 04:56  Phos  3.8     05-30  Mg     1.8     -30    TPro  6.8  /  Alb  2.8<L>  /  TBili  0.5  /  DBili  x   /  AST  58<H>  /  ALT  33  /  AlkPhos  79  05-30      Urinalysis Basic - ( 28 May 2022 23:00 )    Color: Yellow / Appearance: very cloudy / S.015 / pH: x  Gluc: x / Ketone: Trace  / Bili: Negative / Urobili: 1 mg/dL   Blood: x / Protein: 500 mg/dL / Nitrite: Negative   Leuk Esterase: Trace / RBC: 11-25 /HPF / WBC 3-5   Sq Epi: x / Non Sq Epi: x / Bacteria: Many      Magnesium, Serum: 1.8 mg/dL ( @ 04:56)    CAPILLARY BLOOD GLUCOSE        ABG - ( 30 May 2022 06:17 )  pH, Arterial: 7.35  pH, Blood: x     /  pCO2: 50    /  pO2: 98    / HCO3: 29    / Base Excess: 2.4   /  SaO2: 98.0              CARDIAC MARKERS ( 29 May 2022 07:22 )  x     / x     / 398 U/L / x     / x            RECENT CULTURES:      RADIOLOGY & ADDITIONAL TESTS:          Imaging Personally Reviewed:  [ ] YES  [ ] NO    Consultant(s) Notes Reviewed:  [x ] YES  [ ] NO    Care Discussed with Consultants/Other Providers [x ] YES  [ ] NO  Care discussed in detail with patient.  All questions and concerns addressed

## 2022-05-29 NOTE — ED ADULT NURSE REASSESSMENT NOTE - NS ED NURSE REASSESS COMMENT FT1
patient is A&Ox4. Breathing unlabored on RA. patient resting comfortably in stretcher. On cardiac monitor. No acute or respiratory distress noted. Patient to be admitted for seizure and facial abrasions. Fall and safety precautions maintained.

## 2022-05-30 LAB
ALBUMIN SERPL ELPH-MCNC: 2.8 G/DL — LOW (ref 3.3–5)
ALP SERPL-CCNC: 79 U/L — SIGNIFICANT CHANGE UP (ref 40–120)
ALT FLD-CCNC: 33 U/L — SIGNIFICANT CHANGE UP (ref 12–78)
ANION GAP SERPL CALC-SCNC: 12 MMOL/L — SIGNIFICANT CHANGE UP (ref 5–17)
AST SERPL-CCNC: 58 U/L — HIGH (ref 15–37)
BASE EXCESS BLDA CALC-SCNC: 1.7 MMOL/L — SIGNIFICANT CHANGE UP (ref -2–3)
BASOPHILS # BLD AUTO: 0 K/UL — SIGNIFICANT CHANGE UP (ref 0–0.2)
BASOPHILS NFR BLD AUTO: 0 % — SIGNIFICANT CHANGE UP (ref 0–2)
BILIRUB SERPL-MCNC: 0.5 MG/DL — SIGNIFICANT CHANGE UP (ref 0.2–1.2)
BUN SERPL-MCNC: 10 MG/DL — SIGNIFICANT CHANGE UP (ref 7–23)
CALCIUM SERPL-MCNC: 8.8 MG/DL — SIGNIFICANT CHANGE UP (ref 8.5–10.1)
CHLORIDE SERPL-SCNC: 100 MMOL/L — SIGNIFICANT CHANGE UP (ref 96–108)
CO2 BLDA-SCNC: 28 MMOL/L — HIGH (ref 19–24)
CO2 SERPL-SCNC: 26 MMOL/L — SIGNIFICANT CHANGE UP (ref 22–31)
COHGB MFR BLDA: 1.3 % — SIGNIFICANT CHANGE UP
CREAT SERPL-MCNC: 0.9 MG/DL — SIGNIFICANT CHANGE UP (ref 0.5–1.3)
CULTURE RESULTS: SIGNIFICANT CHANGE UP
EGFR: 111 ML/MIN/1.73M2 — SIGNIFICANT CHANGE UP
EOSINOPHIL # BLD AUTO: 0.06 K/UL — SIGNIFICANT CHANGE UP (ref 0–0.5)
EOSINOPHIL NFR BLD AUTO: 1 % — SIGNIFICANT CHANGE UP (ref 0–6)
GAS PNL BLDA: SIGNIFICANT CHANGE UP
GAS PNL BLDA: SIGNIFICANT CHANGE UP
GLUCOSE SERPL-MCNC: 112 MG/DL — HIGH (ref 70–99)
HCO3 BLDA-SCNC: 27 MMOL/L — SIGNIFICANT CHANGE UP (ref 21–28)
HCT VFR BLD CALC: 31.1 % — LOW (ref 39–50)
HGB BLD-MCNC: 10.9 G/DL — LOW (ref 13–17)
HGB BLDA-MCNC: 11.5 G/DL — LOW (ref 12.6–17.4)
HOROWITZ INDEX BLDA+IHG-RTO: 21 — SIGNIFICANT CHANGE UP
LG PLATELETS BLD QL AUTO: SLIGHT — SIGNIFICANT CHANGE UP
LYMPHOCYTES # BLD AUTO: 0.72 K/UL — LOW (ref 1–3.3)
LYMPHOCYTES # BLD AUTO: 13 % — SIGNIFICANT CHANGE UP (ref 13–44)
MAGNESIUM SERPL-MCNC: 1.8 MG/DL — SIGNIFICANT CHANGE UP (ref 1.6–2.6)
MANUAL SMEAR VERIFICATION: SIGNIFICANT CHANGE UP
MCHC RBC-ENTMCNC: 32.4 PG — SIGNIFICANT CHANGE UP (ref 27–34)
MCHC RBC-ENTMCNC: 35 G/DL — SIGNIFICANT CHANGE UP (ref 32–36)
MCV RBC AUTO: 92.6 FL — SIGNIFICANT CHANGE UP (ref 80–100)
MONOCYTES # BLD AUTO: 0.44 K/UL — SIGNIFICANT CHANGE UP (ref 0–0.9)
MONOCYTES NFR BLD AUTO: 8 % — SIGNIFICANT CHANGE UP (ref 2–14)
NEUTROPHILS # BLD AUTO: 4.24 K/UL — SIGNIFICANT CHANGE UP (ref 1.8–7.4)
NEUTROPHILS NFR BLD AUTO: 77 % — SIGNIFICANT CHANGE UP (ref 43–77)
NRBC # BLD: 0 /100 — SIGNIFICANT CHANGE UP (ref 0–0)
NRBC # BLD: SIGNIFICANT CHANGE UP /100 WBCS (ref 0–0)
OXYHGB MFR BLDA: 96 % — HIGH (ref 90–95)
PCO2 BLDA: 42 MMHG — SIGNIFICANT CHANGE UP (ref 32–46)
PH BLDA: 7.41 — SIGNIFICANT CHANGE UP (ref 7.35–7.45)
PHOSPHATE SERPL-MCNC: 3.8 MG/DL — SIGNIFICANT CHANGE UP (ref 2.5–4.5)
PLAT MORPH BLD: NORMAL — SIGNIFICANT CHANGE UP
PLATELET # BLD AUTO: 56 K/UL — LOW (ref 150–400)
PO2 BLDA: 96 MMHG — SIGNIFICANT CHANGE UP (ref 83–108)
POTASSIUM SERPL-MCNC: 3.3 MMOL/L — LOW (ref 3.5–5.3)
POTASSIUM SERPL-SCNC: 3.3 MMOL/L — LOW (ref 3.5–5.3)
PROT SERPL-MCNC: 6.8 GM/DL — SIGNIFICANT CHANGE UP (ref 6–8.3)
RBC # BLD: 3.36 M/UL — LOW (ref 4.2–5.8)
RBC # FLD: 13.8 % — SIGNIFICANT CHANGE UP (ref 10.3–14.5)
RBC BLD AUTO: NORMAL — SIGNIFICANT CHANGE UP
SAO2 % BLDA: 98.1 % — HIGH (ref 94–98)
SODIUM SERPL-SCNC: 138 MMOL/L — SIGNIFICANT CHANGE UP (ref 135–145)
SPECIMEN SOURCE: SIGNIFICANT CHANGE UP
VARIANT LYMPHS # BLD: 1 % — SIGNIFICANT CHANGE UP (ref 0–6)
WBC # BLD: 5.51 K/UL — SIGNIFICANT CHANGE UP (ref 3.8–10.5)
WBC # FLD AUTO: 5.51 K/UL — SIGNIFICANT CHANGE UP (ref 3.8–10.5)

## 2022-05-30 PROCEDURE — 99291 CRITICAL CARE FIRST HOUR: CPT

## 2022-05-30 PROCEDURE — 71045 X-RAY EXAM CHEST 1 VIEW: CPT | Mod: 26

## 2022-05-30 PROCEDURE — 99292 CRITICAL CARE ADDL 30 MIN: CPT

## 2022-05-30 RX ORDER — DEXMEDETOMIDINE HYDROCHLORIDE IN 0.9% SODIUM CHLORIDE 4 UG/ML
0.2 INJECTION INTRAVENOUS
Qty: 200 | Refills: 0 | Status: DISCONTINUED | OUTPATIENT
Start: 2022-05-30 | End: 2022-05-31

## 2022-05-30 RX ORDER — PHENOBARBITAL 60 MG
130 TABLET ORAL ONCE
Refills: 0 | Status: DISCONTINUED | OUTPATIENT
Start: 2022-05-30 | End: 2022-05-30

## 2022-05-30 RX ORDER — SODIUM CHLORIDE 9 MG/ML
1000 INJECTION, SOLUTION INTRAVENOUS ONCE
Refills: 0 | Status: DISCONTINUED | OUTPATIENT
Start: 2022-05-30 | End: 2022-05-30

## 2022-05-30 RX ORDER — PHENOBARBITAL 60 MG
260 TABLET ORAL ONCE
Refills: 0 | Status: DISCONTINUED | OUTPATIENT
Start: 2022-05-30 | End: 2022-05-30

## 2022-05-30 RX ORDER — POTASSIUM CHLORIDE 20 MEQ
10 PACKET (EA) ORAL
Refills: 0 | Status: COMPLETED | OUTPATIENT
Start: 2022-05-30 | End: 2022-05-30

## 2022-05-30 RX ORDER — PHENOBARBITAL 60 MG
130 TABLET ORAL
Refills: 0 | Status: DISCONTINUED | OUTPATIENT
Start: 2022-05-30 | End: 2022-05-31

## 2022-05-30 RX ORDER — CHLORHEXIDINE GLUCONATE 213 G/1000ML
1 SOLUTION TOPICAL
Refills: 0 | Status: DISCONTINUED | OUTPATIENT
Start: 2022-05-30 | End: 2022-05-30

## 2022-05-30 RX ORDER — LEVETIRACETAM 250 MG/1
500 TABLET, FILM COATED ORAL EVERY 12 HOURS
Refills: 0 | Status: DISCONTINUED | OUTPATIENT
Start: 2022-05-30 | End: 2022-05-31

## 2022-05-30 RX ORDER — SODIUM CHLORIDE 9 MG/ML
1000 INJECTION, SOLUTION INTRAVENOUS
Refills: 0 | Status: DISCONTINUED | OUTPATIENT
Start: 2022-05-30 | End: 2022-06-05

## 2022-05-30 RX ORDER — CHLORHEXIDINE GLUCONATE 213 G/1000ML
15 SOLUTION TOPICAL EVERY 12 HOURS
Refills: 0 | Status: DISCONTINUED | OUTPATIENT
Start: 2022-05-30 | End: 2022-05-30

## 2022-05-30 RX ORDER — ACETAMINOPHEN 500 MG
1000 TABLET ORAL ONCE
Refills: 0 | Status: COMPLETED | OUTPATIENT
Start: 2022-05-30 | End: 2022-05-30

## 2022-05-30 RX ORDER — PHENOBARBITAL 60 MG
130 TABLET ORAL EVERY 6 HOURS
Refills: 0 | Status: DISCONTINUED | OUTPATIENT
Start: 2022-05-30 | End: 2022-05-31

## 2022-05-30 RX ORDER — PHENOBARBITAL 60 MG
130 TABLET ORAL
Refills: 0 | Status: DISCONTINUED | OUTPATIENT
Start: 2022-05-30 | End: 2022-05-30

## 2022-05-30 RX ORDER — CHLORHEXIDINE GLUCONATE 213 G/1000ML
1 SOLUTION TOPICAL DAILY
Refills: 0 | Status: DISCONTINUED | OUTPATIENT
Start: 2022-05-30 | End: 2022-06-14

## 2022-05-30 RX ADMIN — Medication 130 MILLIGRAM(S): at 01:55

## 2022-05-30 RX ADMIN — Medication 130 MILLIGRAM(S): at 18:29

## 2022-05-30 RX ADMIN — DEXMEDETOMIDINE HYDROCHLORIDE IN 0.9% SODIUM CHLORIDE 2.83 MICROGRAM(S)/KG/HR: 4 INJECTION INTRAVENOUS at 07:31

## 2022-05-30 RX ADMIN — Medication 130 MILLIGRAM(S): at 15:50

## 2022-05-30 RX ADMIN — Medication 100 MILLIEQUIVALENT(S): at 07:31

## 2022-05-30 RX ADMIN — LEVETIRACETAM 420 MILLIGRAM(S): 250 TABLET, FILM COATED ORAL at 18:30

## 2022-05-30 RX ADMIN — Medication 2 MILLIGRAM(S): at 00:30

## 2022-05-30 RX ADMIN — Medication 1000 MILLIGRAM(S): at 18:30

## 2022-05-30 RX ADMIN — Medication 2 MILLIGRAM(S): at 00:42

## 2022-05-30 RX ADMIN — Medication 100 MILLIEQUIVALENT(S): at 06:26

## 2022-05-30 RX ADMIN — SODIUM CHLORIDE 100 MILLILITER(S): 9 INJECTION, SOLUTION INTRAVENOUS at 03:04

## 2022-05-30 RX ADMIN — Medication 130 MILLIGRAM(S): at 12:29

## 2022-05-30 RX ADMIN — Medication 130 MILLIGRAM(S): at 01:25

## 2022-05-30 RX ADMIN — DEXMEDETOMIDINE HYDROCHLORIDE IN 0.9% SODIUM CHLORIDE 2.83 MICROGRAM(S)/KG/HR: 4 INJECTION INTRAVENOUS at 04:03

## 2022-05-30 RX ADMIN — SODIUM CHLORIDE 100 MILLILITER(S): 9 INJECTION, SOLUTION INTRAVENOUS at 21:58

## 2022-05-30 RX ADMIN — DEXMEDETOMIDINE HYDROCHLORIDE IN 0.9% SODIUM CHLORIDE 2.83 MICROGRAM(S)/KG/HR: 4 INJECTION INTRAVENOUS at 18:38

## 2022-05-30 RX ADMIN — Medication 130 MILLIGRAM(S): at 07:24

## 2022-05-30 RX ADMIN — LEVETIRACETAM 420 MILLIGRAM(S): 250 TABLET, FILM COATED ORAL at 11:16

## 2022-05-30 RX ADMIN — DEXMEDETOMIDINE HYDROCHLORIDE IN 0.9% SODIUM CHLORIDE 2.83 MICROGRAM(S)/KG/HR: 4 INJECTION INTRAVENOUS at 13:41

## 2022-05-30 RX ADMIN — SODIUM CHLORIDE 100 MILLILITER(S): 9 INJECTION, SOLUTION INTRAVENOUS at 12:38

## 2022-05-30 RX ADMIN — Medication 100 MILLIEQUIVALENT(S): at 08:45

## 2022-05-30 RX ADMIN — Medication 130 MILLIGRAM(S): at 02:38

## 2022-05-30 RX ADMIN — Medication 400 MILLIGRAM(S): at 17:31

## 2022-05-30 RX ADMIN — DEXMEDETOMIDINE HYDROCHLORIDE IN 0.9% SODIUM CHLORIDE 2.83 MICROGRAM(S)/KG/HR: 4 INJECTION INTRAVENOUS at 02:37

## 2022-05-30 RX ADMIN — Medication 130 MILLIGRAM(S): at 15:23

## 2022-05-30 NOTE — CONSULT NOTE ADULT - ASSESSMENT
Seizures without clear history and history of etoh abuse  HTN  Diffuse Cerebral and cerebellar atrophy   right extracranial hematoma       Plan  EEG normal, no seizure focus  no AEDs at this time  MRI Brain w/o  rest as per primary team  will continue follow  Seizures without clear history and history of etoh abuse  HTN  Diffuse Cerebral and cerebellar atrophy   right extracranial hematoma       Plan  EEG normal, no seizure focus  no AEDs at this time  MRI Brain w/ and wo  rest as per primary team  will continue follow

## 2022-05-30 NOTE — CONSULT NOTE ADULT - CRITICAL CARE ATTENDING COMMENT
upon arriving to ICU pt agitated.    additional phenobarbital 130mg IVP x1 given    medical floor: ativan 2mg IVP x2, phenobarbital 130mg IVP x1  RRT: phenobarbital 260mg IVP x1  ICU: phenobarbital 130mg IVP x1, started precedex drip, start standing phenobarbital 130mg IVP q6 hrs

## 2022-05-30 NOTE — PROGRESS NOTE ADULT - SUBJECTIVE AND OBJECTIVE BOX
HPI:  Pt is a 43 yo BM with h/o HTN, seizures, alcohol abuse (per ER visit in Jan however pt denies) BIBAs/p seizure. Pt reports while walking outside his hand and R leg starting shaking and he felt dizzy (typical for him prior to seizures; which he has about 4x/yr); the next thing he recalls was seeing EMS. Per EMS, a bystander who witnessed  his seizure called for help; pt noted with left sided tongue bite, right facial hematoma/abrasion. Pt very vague on how much he drinks, reports he last drink 2 beers 3 days ago, states only drinks more when he "parties" and denies recent partying. Pt reports he was on medications for HTN and Keppra however states no one refilled them. Upon admission code gray earlier in night and RRT for severe agitation, disrobing, attempting to climb out of bed. Pt transferred to the ICU 2 to ETOH withdrawal/DTs      ## Labs:  CBC:                        10.9   5.51  )-----------( 56       ( 30 May 2022 04:56 )             31.1     Chem:  05-30    138  |  100  |  10  ----------------------------<  112<H>  3.3<L>   |  26  |  0.90    Ca    8.8      30 May 2022 04:56  Phos  3.8     05-30  Mg     1.8     05-30    TPro  6.8  /  Alb  2.8<L>  /  TBili  0.5  /  DBili  x   /  AST  58<H>  /  ALT  33  /  AlkPhos  79  05-30    Coags:    culture blood:  --  05-29 @ 12:10            culture sputum:     <10,000 CFU/mL Normal Urogenital Karla           culture urine:  -- 05-29 @ 12:10        ## Imaging:    ## Medications:              acetaminophen     Tablet .. 650 milliGRAM(s) Oral every 6 hours PRN  dexMEDEtomidine Infusion 0.2 MICROgram(s)/kG/Hr IV Continuous <Continuous>  levETIRAcetam  IVPB 500 milliGRAM(s) IV Intermittent every 12 hours  ondansetron Injectable 4 milliGRAM(s) IV Push every 8 hours PRN  PHENobarbital Injectable 130 milliGRAM(s) IV Push every 6 hours      ## Vitals:  T(C): 37.7 (05-30-22 @ 14:00), Max: 37.7 (05-30-22 @ 14:00)  HR: 124 (05-30-22 @ 15:21) (71 - 131)  BP: 154/104 (05-30-22 @ 15:10) (110/85 - 167/123)  BP(mean): 120 (05-30-22 @ 15:10) (95 - 137)  RR: 29 (05-30-22 @ 15:21) (12 - 30)  SpO2: 91% (05-30-22 @ 15:00) (80% - 100%)  Wt(kg): --  Vent:   ABG: ABG - ( 30 May 2022 08:47 )  pH, Arterial: 7.41  pH, Blood: x     /  pCO2: 42    /  pO2: 96    / HCO3: 27    / Base Excess: 1.7   /  SaO2: x                     05-29 @ 07:01  -  05-30 @ 07:00  --------------------------------------------------------  IN: 1248.4 mL / OUT: 700 mL / NET: 548.4 mL    05-30 @ 07:01  -  05-30 @ 16:32  --------------------------------------------------------  IN: 952.8 mL / OUT: 0 mL / NET: 952.8 mL          ## P/E:  Gen: lying comfortably in bed in no apparent distress  Lungs: CTA  Heart: Initially RRR and then became tachy  Abd: Soft/+BS  Ext: No edema  Neuro: Initially obtunded then became agitated and trying to get out of bed    CENTRAL LINE: [ ] YES [ ] NO  LOCATION:   DATE INSERTED:  REMOVE: [ ] YES [ ] NO      BRIGHT: [ ] YES [ ] NO    DATE INSERTED:  REMOVE:  [ ] YES [ ] NO      A-LINE:  [ ] YES [ ] NO  LOCATION:   DATE INSERTED:  REMOVE:  [ ] YES [ ] NO  EXPLAIN:    CODE STATUS: [x ] full code  [ ] DNR  [ ] DNI  [ ] MOLST  Goals of care discussion: [ ] yes

## 2022-05-30 NOTE — CONSULT NOTE ADULT - SUBJECTIVE AND OBJECTIVE BOX
Patient is a 42y old  Male who presents with a chief complaint of Seizure, MARTY (28 May 2022 23:19)      HPI:          42 year old male with h/o HTN, seizures, alcohol abuse (per ED visit in  however pt denies) presents biba s/p seizure. Pt reports while walking outside his hand and R leg starting shaking and he felt dizzy (typical for him prior to seizures; which he has about 4x/yr); the next thing he recalls was seeing EMS. Per EMS, a bystander who witnessed  his seizure activity called for help; pt noted w/ left sided tongue bite, right facial hematoma/abrasion. Pt very vague on how much he drinks, reports he last drink 2 beers 3 days ago, states only drinks more when he "parties" and denies recent partying. Pt reports he was on medications for HTN and Keppra however states no one refilled them and he never inquired if he was supposed to continue.    In ED initial vitals /114,  rest of vitals stable. Labs sig for plt 71, Na 134, Cr 1.79, see below for rest of labs. CT head/C-spine Right frontal scalp hematoma without foreign body or fracture. No acute intracranial bleeding. Volume loss, greater than expected for patient's age. Finding may be   related to history of seizure and cranioplasty medication. (28 May 2022 23:19)      Allergies    No Known Allergies    Intolerances        MEDICATIONS  (STANDING):  folic acid 1 milliGRAM(s) Oral daily  levETIRAcetam 500 milliGRAM(s) Oral two times a day  multivitamin 1 Tablet(s) Oral daily  sodium chloride 0.9%. 1000 milliLiter(s) (150 mL/Hr) IV Continuous <Continuous>  thiamine 100 milliGRAM(s) Oral daily    MEDICATIONS  (PRN):  acetaminophen     Tablet .. 650 milliGRAM(s) Oral every 6 hours PRN Temp greater or equal to 38C (100.4F), Mild Pain (1 - 3)  LORazepam   Injectable 2 milliGRAM(s) IV Push every 1 hour PRN CIWA-Ar score 8 or greater  ondansetron Injectable 4 milliGRAM(s) IV Push every 8 hours PRN Nausea and/or Vomiting      Daily     Daily Weight in k.6 (29 May 2022 06:40)    Drug Dosing Weight  Height (cm): 167.6 (28 May 2022 18:47)  Weight (kg): 56.6 (29 May 2022 06:40)  BMI (kg/m2): 20.1 (29 May 2022 06:40)  BSA (m2): 1.64 (29 May 2022 06:40)    PAST MEDICAL & SURGICAL HISTORY:  Seizures      Hypertension      No significant past surgical history          FAMILY HISTORY:  No pertinent family history in first degree relatives        SOCIAL HISTORY:    ADVANCE DIRECTIVES:    REVIEW OF SYSTEMS:    CONSTITUTIONAL: No fever, weight loss, or fatigue  EYES: No eye pain, visual disturbances, or discharge  ENMT:  No difficulty hearing, tinnitus, vertigo; No sinus or throat pain  NECK: No pain or stiffness  BREASTS: No pain, masses, or nipple discharge  RESPIRATORY: No cough, wheezing, chills or hemoptysis; No shortness of breath  CARDIOVASCULAR: No chest pain, palpitations, dizziness, or leg swelling  GASTROINTESTINAL: No abdominal or epigastric pain. No nausea, vomiting, or hematemesis; No diarrhea or constipation. No melena or hematochezia.  GENITOURINARY: No dysuria, frequency, hematuria, or incontinence  NEUROLOGICAL: No headaches, memory loss, loss of strength, numbness, or tremors  SKIN: No itching, burning, rashes, or lesions   LYMPH NODES: No enlarged glands  ENDOCRINE: No heat or cold intolerance; No hair loss  MUSCULOSKELETAL: No joint pain or swelling; No muscle, back, or extremity pain  PSYCHIATRIC: No depression, anxiety, mood swings, or difficulty sleeping  HEME/LYMPH: No easy bruising, or bleeding gums  ALLERGY AND IMMUNOLOGIC: No hives or eczema          ICU Vital Signs Last 24 Hrs  T(C): 37.2 (29 May 2022 23:53), Max: 37.2 (29 May 2022 23:53)  T(F): 99 (29 May 2022 23:53), Max: 99 (29 May 2022 23:53)  HR: 90 (29 May 2022 23:53) (77 - 102)  BP: 151/97 (29 May 2022 23:53) (136/96 - 161/110)  BP(mean): --  ABP: --  ABP(mean): --  RR: 18 (29 May 2022 23:53) (12 - 18)  SpO2: 99% (29 May 2022 23:53) (96% - 100%)          I&O's Detail    29 May 2022 07:01  -  30 May 2022 02:15  --------------------------------------------------------  IN:    Oral Fluid: 480 mL  Total IN: 480 mL    OUT:    Voided (mL): 700 mL  Total OUT: 700 mL    Total NET: -220 mL          PHYSICAL EXAM:    GENERAL: NAD, well-groomed, well-developed  HEAD:  Atraumatic, Normocephalic  EYES: EOMI, PERRLA, conjunctiva and sclera clear  ENMT: No tonsillar erythema, exudates, or enlargement; Moist mucous membranes, Good dentition, No lesions  NECK: Supple, No JVD, Normal thyroid  NERVOUS SYSTEM:  Alert & Oriented X3, Good concentration; Motor Strength 5/5 B/L upper and lower extremities; DTRs 2+ intact and symmetric  CHEST/LUNG: Clear to percussion bilaterally; No rales, rhonchi, wheezing, or rubs  HEART: Regular rate and rhythm; No murmurs, rubs, or gallops  ABDOMEN: Soft, Nontender, Nondistended; Bowel sounds present  EXTREMITIES:  2+ Peripheral Pulses, No clubbing, cyanosis, or edema  LYMPH: No lymphadenopathy noted  SKIN: No rashes or lesions    LABS:  CBC Full  -  ( 29 May 2022 07:22 )  WBC Count : 3.56 K/uL  RBC Count : 3.73 M/uL  Hemoglobin : 12.2 g/dL  Hematocrit : 35.0 %  Platelet Count - Automated : 60 K/uL  Mean Cell Volume : 93.8 fl  Mean Cell Hemoglobin : 32.7 pg  Mean Cell Hemoglobin Concentration : 34.9 g/dL  Auto Neutrophil # : x  Auto Lymphocyte # : x  Auto Monocyte # : x  Auto Eosinophil # : x  Auto Basophil # : x  Auto Neutrophil % : x  Auto Lymphocyte % : x  Auto Monocyte % : x  Auto Eosinophil % : x  Auto Basophil % : x    -    137  |  100  |  10  ----------------------------<  78  3.8   |  26  |  1.16    Ca    8.8      29 May 2022 07:22    TPro  7.2  /  Alb  3.1<L>  /  TBili  0.6  /  DBili  x   /  AST  70<H>  /  ALT  32  /  AlkPhos  86  05-29    CAPILLARY BLOOD GLUCOSE          Urinalysis Basic - ( 28 May 2022 23:00 )    Color: Yellow / Appearance: very cloudy / S.015 / pH: x  Gluc: x / Ketone: Trace  / Bili: Negative / Urobili: 1 mg/dL   Blood: x / Protein: 500 mg/dL / Nitrite: Negative   Leuk Esterase: Trace / RBC: 11-25 /HPF / WBC 3-5   Sq Epi: x / Non Sq Epi: x / Bacteria: Many      CARDIAC MARKERS ( 29 May 2022 07:22 )  x     / x     / 398 U/L / x     / x              EKG:    ECHO, US:    RADIOLOGY:    CRITICAL CARE TIME SPENT:   history obtained from chart, pt unable to provide any history as he currently is confused, disoriented, incomprehensible     HPI:  42M PMH HTN, ETOH abuse, seizures brought in by ambulance for seizure. Pt reportedly developed shakes of his hand and R leg and feeling dizzy. Unable to recall events after that. EMS reports bystander witnessed pt with seizure activity and called EMS. Pt was found to have L tongue bite, R facial abrasion/hematoma by EMS. Reports he last drank 2 beer three days prior. CT head with right frontal scalp hematoma. No acute intracranial bleeding. Admitted to medical floor  for seizure. Alcohol level <10. Placed on CIWA and symptom trigger ativan q1 hr prn CWIA >8.     Code gray called today and RRT for agitation. CIWA 15. Pt disoriented, speaking, pointing to objects in the air, speech incomprehensible. Disrobing, trying to climb out of bed. Security at bedside holding pt down but pt keeps attempting to climb out of bed. Pt has been on 1:1 observation who states pt getting more and more restless through the night.     s/p ativan 2mg IVP x2. phenobarbital 130mg IVP x1. prior to RRT    At RRT phenobarbital 260mg IVP x1 given with persistent agitation. New IV placed during RRT.       Allergies  No Known Allergies      MEDICATIONS  (STANDING):  folic acid 1 milliGRAM(s) Oral daily  levETIRAcetam 500 milliGRAM(s) Oral two times a day  multivitamin 1 Tablet(s) Oral daily  sodium chloride 0.9%. 1000 milliLiter(s) (150 mL/Hr) IV Continuous <Continuous>  thiamine 100 milliGRAM(s) Oral daily    MEDICATIONS  (PRN):  acetaminophen     Tablet .. 650 milliGRAM(s) Oral every 6 hours PRN Temp greater or equal to 38C (100.4F), Mild Pain (1 - 3)  LORazepam   Injectable 2 milliGRAM(s) IV Push every 1 hour PRN CIWA-Ar score 8 or greater  ondansetron Injectable 4 milliGRAM(s) IV Push every 8 hours PRN Nausea and/or Vomiting      Daily     Daily Weight in k.6 (29 May 2022 06:40)    Drug Dosing Weight  Height (cm): 167.6 (28 May 2022 18:47)  Weight (kg): 56.6 (29 May 2022 06:40)  BMI (kg/m2): 20.1 (29 May 2022 06:40)  BSA (m2): 1.64 (29 May 2022 06:40)    PAST MEDICAL & SURGICAL HISTORY:  Seizures      Hypertension      alcohol abuse          FAMILY HISTORY:  No pertinent family history in first degree relatives        SOCIAL HISTORY:  ETOH abuse        REVIEW OF SYSTEMS:  [x] unable to obtain due to delirium        Vital Signs Last 24 Hrs  T(C): 37.2 (29 May 2022 23:53), Max: 37.2 (29 May 2022 23:53)  T(F): 99 (29 May 2022 23:53), Max: 99 (29 May 2022 23:53)  HR: 90 (29 May 2022 23:53) (77 - 102)  BP: 151/97 (29 May 2022 23:53) (136/96 - 161/110)  RR: 18 (29 May 2022 23:53) (12 - 18)  SpO2: 99% (29 May 2022 23:53) (96% - 100%)          I&O's Detail    29 May 2022 07:01  -  30 May 2022 02:15  --------------------------------------------------------  IN:    Oral Fluid: 480 mL  Total IN: 480 mL    OUT:    Voided (mL): 700 mL  Total OUT: 700 mL    Total NET: -220 mL          PHYSICAL EXAM:  GENERAL: confused, disoriented, babbling, pointing to things in the air  HEAD: L forehead abrasion  EYES: EOMI, conjunctiva and sclera clear  ENMT:  dry mucous membranes  NECK: Supple, No JVD  NERVOUS SYSTEM:  awake, restless, agitated, poorly redirectable, moving all extremities  CHEST/LUNG: Clear to ausculation bilaterally; No rales, rhonchi, wheezing  HEART: tachycardic  ABDOMEN: Soft, Nontender, Nondistended; Bowel sounds present  EXTREMITIES:  2+ Peripheral Pulses, No clubbing, cyanosis, or edema      LABS:  CBC Full  -  ( 29 May 2022 07:22 )  WBC Count : 3.56 K/uL  RBC Count : 3.73 M/uL  Hemoglobin : 12.2 g/dL  Hematocrit : 35.0 %  Platelet Count - Automated : 60 K/uL  Mean Cell Volume : 93.8 fl  Mean Cell Hemoglobin : 32.7 pg  Mean Cell Hemoglobin Concentration : 34.9 g/dL  Auto Neutrophil # : x  Auto Lymphocyte # : x  Auto Monocyte # : x  Auto Eosinophil # : x  Auto Basophil # : x  Auto Neutrophil % : x  Auto Lymphocyte % : x  Auto Monocyte % : x  Auto Eosinophil % : x  Auto Basophil % : x        137  |  100  |  10  ----------------------------<  78  3.8   |  26  |  1.16    Ca    8.8      29 May 2022 07:22    TPro  7.2  /  Alb  3.1<L>  /  TBili  0.6  /  DBili  x   /  AST  70<H>  /  ALT  32  /  AlkPhos  86        Urinalysis Basic - ( 28 May 2022 23:00 )    Color: Yellow / Appearance: very cloudy / S.015 / pH: x  Gluc: x / Ketone: Trace  / Bili: Negative / Urobili: 1 mg/dL   Blood: x / Protein: 500 mg/dL / Nitrite: Negative   Leuk Esterase: Trace / RBC: 11-25 /HPF / WBC 3-5   Sq Epi: x / Non Sq Epi: x / Bacteria: Many    Alcohol, Blood (22 @ 22:16)    Alcohol, Blood: <10 mg/dL    Urine Toxicology: negative    RADIOLOGY:  CT head and c spine < from: CT Head No Cont (22 @ 20:55) >  CT BRAIN: Right frontal scalp hematoma without foreign body or fracture.   No acute intracranial bleeding.    Volume loss, greater than expected for patient's age. Finding may be   related to history of seizure and cranioplasty medication.    CT CERVICAL SPINE: No fracture.

## 2022-05-30 NOTE — CONSULT NOTE ADULT - SUBJECTIVE AND OBJECTIVE BOX
42 year old male with h/o HTN, seizures, alcohol abuse (per ED visit in Jan however pt denies) presents biba s/p seizure. Pt reports while walking outside his hand and R leg starting shaking and he felt dizzy (typical for him prior to seizures; which he has about 4x/yr); the next thing he recalls was seeing EMS. Per EMS, a bystander who witnessed  his seizure activity called for help; pt noted w/ left sided tongue bite, right facial hematoma/abrasion. Pt very vague on how much he drinks, reports he last drink 2 beers 3 days ago, states only drinks more when he "parties" and denies recent partying. Pt reports he was on medications for HTN and Keppra however states no one refilled them and he never inquired if he was supposed to continue. CT head/C-spine Right frontal scalp hematoma without foreign body or fracture. No acute intracranial bleeding. Volume loss, greater than expected for patient's age. Finding may be   related to history of seizure and cranioplasty medication.    Poor historian, information obtained from chart.

## 2022-05-30 NOTE — PROGRESS NOTE ADULT - ASSESSMENT
Pt is a 41 yo BM with h/o HTN, seizures, alcohol abuse (per ER visit in Jan however pt denies) BIBAs/p seizure. Pt reports while walking outside his hand and R leg starting shaking and he felt dizzy (typical for him prior to seizures; which he has about 4x/yr); the next thing he recalls was seeing EMS. Per EMS, a bystander who witnessed  his seizure called for help; pt noted with left sided tongue bite, right facial hematoma/abrasion. Pt very vague on how much he drinks, reports he last drink 2 beers 3 days ago, states only drinks more when he "parties" and denies recent partying. Pt reports he was on medications for HTN and Keppra however states no one refilled them. Upon admission code gray earlier in night and RRT for severe agitation, disrobing, attempting to climb out of bed. Pt transferred to the ICU 2 to ETOH withdrawal/DTs    Resp: Elevate HOB/ Supplemental O2 prn to maintain O2 sat >92%  HEME: DVT prophylaxis with Venodynes  FEN: NPO/ IVF/ Daily Thiamine, MVI and Folate   Neuro/Psych: Standing IV Phenobarb + prn Phenobarb and Precedex/ EEG no Sz but unclear if pt has a h/o Sz not related to ETOH; for now cont Keppra/ Neuro consult noted

## 2022-05-30 NOTE — CONSULT NOTE ADULT - ASSESSMENT
pt tremulous, disoriented    code gray earlier in night    RRT for severe agitation, disrobing, attempting to climb out of bed    s/p phenobarbital 130mg IVP x1    s/p phenobarbital 260 IVP x1    transfer to ICU for DTs, CIWA > 15. start precedex drip     42M PMH HTN, ETOH abuse, seizures brought in by ambulance for seizure with L tongue bite, R facial abrasion, scalp hematoma. Last drink was three days prior to admission. Admitted to medical floor 5/28 for seizure likely withdrawal seizure. Alcohol level <10. Course with ETOH withdrawal, DTs s/p code gray and RRT for CIWA 15. Transferred to ICU for agitation requiring sedation protocol, DTs/ETOH withdrawal, withdrawal seizure    - sedate with precedex drip  - start standing phenobarbital 130mg IVP q6 hours and wean off precedex drip  - supplement hypokalemia  - monitor electrolytes  - ativan prn breakthrough seizure  - cont antiepileptic keppra (previously prescribed but ?compliance)  - MVI, thiamine, folic acid  - IVF hydration  - if pt remains sedated with precedex drip can d/c 1:1 constant observation    CC time at RRT and upon admission to ICU: 80 min

## 2022-05-31 LAB
ALBUMIN SERPL ELPH-MCNC: 2.4 G/DL — LOW (ref 3.3–5)
ALP SERPL-CCNC: 66 U/L — SIGNIFICANT CHANGE UP (ref 40–120)
ALT FLD-CCNC: 27 U/L — SIGNIFICANT CHANGE UP (ref 12–78)
ANION GAP SERPL CALC-SCNC: 11 MMOL/L — SIGNIFICANT CHANGE UP (ref 5–17)
AST SERPL-CCNC: 45 U/L — HIGH (ref 15–37)
BILIRUB SERPL-MCNC: 0.7 MG/DL — SIGNIFICANT CHANGE UP (ref 0.2–1.2)
BUN SERPL-MCNC: 10 MG/DL — SIGNIFICANT CHANGE UP (ref 7–23)
CALCIUM SERPL-MCNC: 8.5 MG/DL — SIGNIFICANT CHANGE UP (ref 8.5–10.1)
CHLORIDE SERPL-SCNC: 103 MMOL/L — SIGNIFICANT CHANGE UP (ref 96–108)
CO2 SERPL-SCNC: 23 MMOL/L — SIGNIFICANT CHANGE UP (ref 22–31)
CREAT SERPL-MCNC: 0.99 MG/DL — SIGNIFICANT CHANGE UP (ref 0.5–1.3)
EGFR: 99 ML/MIN/1.73M2 — SIGNIFICANT CHANGE UP
GLUCOSE SERPL-MCNC: 92 MG/DL — SIGNIFICANT CHANGE UP (ref 70–99)
HCT VFR BLD CALC: 32.8 % — LOW (ref 39–50)
HGB BLD-MCNC: 11.5 G/DL — LOW (ref 13–17)
MAGNESIUM SERPL-MCNC: 1.7 MG/DL — SIGNIFICANT CHANGE UP (ref 1.6–2.6)
MCHC RBC-ENTMCNC: 32.8 PG — SIGNIFICANT CHANGE UP (ref 27–34)
MCHC RBC-ENTMCNC: 35.1 G/DL — SIGNIFICANT CHANGE UP (ref 32–36)
MCV RBC AUTO: 93.4 FL — SIGNIFICANT CHANGE UP (ref 80–100)
METHGB MFR BLDA: SIGNIFICANT CHANGE UP %
NRBC # BLD: 0 /100 WBCS — SIGNIFICANT CHANGE UP (ref 0–0)
PHENOBARB SERPL-MCNC: 33.7 UG/ML — SIGNIFICANT CHANGE UP (ref 15–40)
PHOSPHATE SERPL-MCNC: 4.3 MG/DL — SIGNIFICANT CHANGE UP (ref 2.5–4.5)
PLATELET # BLD AUTO: 58 K/UL — LOW (ref 150–400)
POTASSIUM SERPL-MCNC: 3.8 MMOL/L — SIGNIFICANT CHANGE UP (ref 3.5–5.3)
POTASSIUM SERPL-SCNC: 3.8 MMOL/L — SIGNIFICANT CHANGE UP (ref 3.5–5.3)
PROT SERPL-MCNC: 6.3 GM/DL — SIGNIFICANT CHANGE UP (ref 6–8.3)
RBC # BLD: 3.51 M/UL — LOW (ref 4.2–5.8)
RBC # FLD: 14.3 % — SIGNIFICANT CHANGE UP (ref 10.3–14.5)
SODIUM SERPL-SCNC: 137 MMOL/L — SIGNIFICANT CHANGE UP (ref 135–145)
WBC # BLD: 8.92 K/UL — SIGNIFICANT CHANGE UP (ref 3.8–10.5)
WBC # FLD AUTO: 8.92 K/UL — SIGNIFICANT CHANGE UP (ref 3.8–10.5)

## 2022-05-31 PROCEDURE — 99291 CRITICAL CARE FIRST HOUR: CPT

## 2022-05-31 RX ORDER — POTASSIUM CHLORIDE 20 MEQ
10 PACKET (EA) ORAL
Refills: 0 | Status: COMPLETED | OUTPATIENT
Start: 2022-05-31 | End: 2022-05-31

## 2022-05-31 RX ORDER — MAGNESIUM SULFATE 500 MG/ML
2 VIAL (ML) INJECTION ONCE
Refills: 0 | Status: COMPLETED | OUTPATIENT
Start: 2022-05-31 | End: 2022-05-31

## 2022-05-31 RX ORDER — PHENOBARBITAL 60 MG
130 TABLET ORAL
Refills: 0 | Status: DISCONTINUED | OUTPATIENT
Start: 2022-05-31 | End: 2022-06-02

## 2022-05-31 RX ORDER — MIDAZOLAM HYDROCHLORIDE 1 MG/ML
2 INJECTION, SOLUTION INTRAMUSCULAR; INTRAVENOUS ONCE
Refills: 0 | Status: DISCONTINUED | OUTPATIENT
Start: 2022-05-31 | End: 2022-05-31

## 2022-05-31 RX ORDER — LEVETIRACETAM 250 MG/1
500 TABLET, FILM COATED ORAL
Refills: 0 | Status: DISCONTINUED | OUTPATIENT
Start: 2022-05-31 | End: 2022-06-01

## 2022-05-31 RX ORDER — DEXMEDETOMIDINE HYDROCHLORIDE IN 0.9% SODIUM CHLORIDE 4 UG/ML
0.2 INJECTION INTRAVENOUS
Qty: 200 | Refills: 0 | Status: DISCONTINUED | OUTPATIENT
Start: 2022-05-31 | End: 2022-06-05

## 2022-05-31 RX ADMIN — Medication 130 MILLIGRAM(S): at 06:47

## 2022-05-31 RX ADMIN — MIDAZOLAM HYDROCHLORIDE 2 MILLIGRAM(S): 1 INJECTION, SOLUTION INTRAMUSCULAR; INTRAVENOUS at 11:41

## 2022-05-31 RX ADMIN — Medication 130 MILLIGRAM(S): at 02:44

## 2022-05-31 RX ADMIN — Medication 100 MILLIGRAM(S): at 12:11

## 2022-05-31 RX ADMIN — Medication 100 MILLIEQUIVALENT(S): at 03:09

## 2022-05-31 RX ADMIN — Medication 100 MILLIEQUIVALENT(S): at 05:12

## 2022-05-31 RX ADMIN — LEVETIRACETAM 500 MILLIGRAM(S): 250 TABLET, FILM COATED ORAL at 18:02

## 2022-05-31 RX ADMIN — DEXMEDETOMIDINE HYDROCHLORIDE IN 0.9% SODIUM CHLORIDE 2.94 MICROGRAM(S)/KG/HR: 4 INJECTION INTRAVENOUS at 13:26

## 2022-05-31 RX ADMIN — DEXMEDETOMIDINE HYDROCHLORIDE IN 0.9% SODIUM CHLORIDE 2.94 MICROGRAM(S)/KG/HR: 4 INJECTION INTRAVENOUS at 18:06

## 2022-05-31 RX ADMIN — Medication 1 MILLIGRAM(S): at 18:02

## 2022-05-31 RX ADMIN — LEVETIRACETAM 420 MILLIGRAM(S): 250 TABLET, FILM COATED ORAL at 06:09

## 2022-05-31 RX ADMIN — CHLORHEXIDINE GLUCONATE 1 APPLICATION(S): 213 SOLUTION TOPICAL at 12:12

## 2022-05-31 RX ADMIN — SODIUM CHLORIDE 100 MILLILITER(S): 9 INJECTION, SOLUTION INTRAVENOUS at 09:49

## 2022-05-31 RX ADMIN — Medication 25 GRAM(S): at 03:10

## 2022-05-31 RX ADMIN — Medication 100 MILLIGRAM(S): at 11:36

## 2022-05-31 RX ADMIN — Medication 100 MILLIGRAM(S): at 18:02

## 2022-05-31 RX ADMIN — Medication 130 MILLIGRAM(S): at 13:37

## 2022-05-31 RX ADMIN — Medication 1 TABLET(S): at 12:10

## 2022-05-31 RX ADMIN — Medication 130 MILLIGRAM(S): at 11:17

## 2022-05-31 RX ADMIN — Medication 130 MILLIGRAM(S): at 06:10

## 2022-05-31 RX ADMIN — Medication 130 MILLIGRAM(S): at 20:30

## 2022-05-31 NOTE — PROGRESS NOTE ADULT - SUBJECTIVE AND OBJECTIVE BOX
Pt resting comfortably, still remains confused but more awake.     Physical Exam:   · Neurological	detailed exam  · Mental Status	Awake, lethargic says phrases but not appropriate. confused. not oriented.  · Cranial Nerve	PERRLA Face symmetric, does not track.  · Motor	Moving all extremities spontaneously  Toes downgoing  · Sensory	withdraws equally in all extr    Assessment and Recommendation:   · Assessment	  Seizures without clear history and history of etoh abuse  HTN  Diffuse Cerebral and cerebellar atrophy   right extracranial hematoma       Plan  EEG normal, no seizure focus  no AEDs at this time  MRI Brain w/ and wo  CIWA  MVI  rest as per primary team  will continue follow

## 2022-05-31 NOTE — PROGRESS NOTE ADULT - ASSESSMENT
42M PMH HTN, ETOH abuse, seizures brought in by ambulance for seizure with L tongue bite, R facial abrasion, scalp hematoma. Last drink was three days prior to admission. Admitted to medical floor 5/28 for seizure likely withdrawal seizure. Alcohol level <10. Course with ETOH withdrawal, DTs s/p code gray and RRT for CIWA 15. Transferred to ICU for agitation requiring sedation protocol, DTs/ETOH withdrawal, withdrawal seizure    - wean off precedex drip as tolerates  - had been on standing phenobarbital, pt taking PO intake due to phenobarbital shortage, changed to librium  - supplement and monitor electrolytes as needed  - ativan prn breakthrough seizure  - cont antiepileptic keppra (previously prescribed but ?compliance), unclear if pt has hx of primary seizures vs seizures secondary to alcohol wtrawal  - neurology follow up   - MVI, thiamine, folic acid  - d/c IVF hydration as pt tolerating po intake  - remains in ICU on precedex drip for sedation

## 2022-05-31 NOTE — PROGRESS NOTE ADULT - SUBJECTIVE AND OBJECTIVE BOX
24 hr events:  weaned off precedex overnight  more awake and redirectable  able to hold conversation this morning  phenobarbital changed to po librium  pt this afternoon became restless, agitated, climbing out of bed  s/p phenobarbital prn pushes and versed 2mg IVP and librium  placed on 1:1 observation due to restlessness and agitation    ## ROS:  [x ] unable to obtain due to DTs    ## Labs:  Complete Blood Count in AM (05.31.22 @ 02:24)    Nucleated RBC: 0 /100 WBCs    WBC Count: 8.92 K/uL    RBC Count: 3.51 M/uL    Hemoglobin: 11.5 g/dL    Hematocrit: 32.8 %    Mean Cell Volume: 93.4 fl    Mean Cell Hemoglobin: 32.8 pg    Mean Cell Hemoglobin Conc: 35.1 g/dL    Red Cell Distrib Width: 14.3 %    Platelet Count - Automated: 58 K/uL      Comprehensive Metabolic Panel in AM (05.31.22 @ 02:24)    Sodium, Serum: 137 mmol/L    Potassium, Serum: 3.8 mmol/L    Chloride, Serum: 103 mmol/L    Carbon Dioxide, Serum: 23 mmol/L    Anion Gap, Serum: 11 mmol/L    Blood Urea Nitrogen, Serum: 10 mg/dL    Creatinine, Serum: 0.99 mg/dL    Glucose, Serum: 92 mg/dL    Calcium, Total Serum: 8.5 mg/dL    Protein Total, Serum: 6.3 gm/dL    Albumin, Serum: 2.4 g/dL    Bilirubin Total, Serum: 0.7 mg/dL    Alkaline Phosphatase, Serum: 66 U/L    Aspartate Aminotransferase (AST/SGOT): 45 U/L    Alanine Aminotransferase (ALT/SGPT): 27 U/L      ## Medications:    acetaminophen     Tablet .. 650 milliGRAM(s) Oral every 6 hours PRN  chlordiazePOXIDE   Oral   chlordiazePOXIDE 100 milliGRAM(s) Oral every 6 hours  dexMEDEtomidine Infusion 0.2 MICROgram(s)/kG/Hr IV Continuous <Continuous>  levETIRAcetam  IVPB 500 milliGRAM(s) IV Intermittent every 12 hours  ondansetron Injectable 4 milliGRAM(s) IV Push every 8 hours PRN  PHENobarbital Injectable 130 milliGRAM(s) IV Push every 2 hours PRN      ## Vitals:  T(C): 37.3  Max: 37.4 (05-31-22 @ 08:00)  HR: 93  (81 - 111)  BP: 149/93  (100/63 - 183/113)  BP(mean): 108 (75 - 143)  RR: 32 (0 - 42)  SpO2: 100%  (87% - 100%)      ABG: ABG - ( 30 May 2022 08:47 )  pH, Arterial: 7.41    /  pCO2: 42    /  pO2: 96    / HCO3: 27    / Base Excess: 1.7   /  SaO2: x             05-30 @ 07:01  -  05-31 @ 07:00  --------------------------------------------------------  IN: 3184.3 mL / OUT: 2025 mL / NET: 1159.3 mL        ## P/E:  Gen: lying in bed, sedated  HEENT: PERRL, EOMI, sclera white  Resp: CTA B/L , no wheeze, no rhonchi  CVS: RRR  Abd: soft NT/ND +BS  Ext: no c/c/e  Neuro: A&Ox2 (person and time)    CENTRAL LINE: [ ] YES [x ] NO  LOCATION:   DATE INSERTED:  REMOVE: [ ] YES [ ] NO      BRIGHT: [ ] YES [ ] NO    DATE INSERTED:  REMOVE:  [ ] YES [ ] NO      A-LINE:  [ ] YES [x ] NO  LOCATION:   DATE INSERTED:  REMOVE:  [ ] YES [ ] NO  EXPLAIN:    GLOBAL ISSUE/BEST PRACTICE:  Analgesia: n/a  Sedation: precedex, librium  HOB elevation: yes  Stress ulcer prophylaxis: n/a  VTE prophylaxis: bilateral compression devices  Oral Care: n/a  Glycemic control: n/a  Nutrition: po diet    CODE STATUS: [x ] full code  [ ] DNR  [ ] DNI  [ ] MOLST  Goals of care discussion: [ ] yes

## 2022-05-31 NOTE — PHARMACOTHERAPY INTERVENTION NOTE - COMMENTS
Recommended to switch levetiracetam 500 mg BID IVP to levetiracetam 500 mg BID PO since the pt is tolerating PO.

## 2022-06-01 LAB
ALBUMIN SERPL ELPH-MCNC: 2.4 G/DL — LOW (ref 3.3–5)
ALP SERPL-CCNC: 80 U/L — SIGNIFICANT CHANGE UP (ref 40–120)
ALT FLD-CCNC: 23 U/L — SIGNIFICANT CHANGE UP (ref 12–78)
ANION GAP SERPL CALC-SCNC: 7 MMOL/L — SIGNIFICANT CHANGE UP (ref 5–17)
AST SERPL-CCNC: 33 U/L — SIGNIFICANT CHANGE UP (ref 15–37)
BASOPHILS # BLD AUTO: 0.03 K/UL — SIGNIFICANT CHANGE UP (ref 0–0.2)
BASOPHILS NFR BLD AUTO: 0.3 % — SIGNIFICANT CHANGE UP (ref 0–2)
BILIRUB SERPL-MCNC: 0.4 MG/DL — SIGNIFICANT CHANGE UP (ref 0.2–1.2)
BUN SERPL-MCNC: 10 MG/DL — SIGNIFICANT CHANGE UP (ref 7–23)
CALCIUM SERPL-MCNC: 8.6 MG/DL — SIGNIFICANT CHANGE UP (ref 8.5–10.1)
CHLORIDE SERPL-SCNC: 100 MMOL/L — SIGNIFICANT CHANGE UP (ref 96–108)
CO2 SERPL-SCNC: 25 MMOL/L — SIGNIFICANT CHANGE UP (ref 22–31)
CREAT SERPL-MCNC: 0.96 MG/DL — SIGNIFICANT CHANGE UP (ref 0.5–1.3)
EGFR: 102 ML/MIN/1.73M2 — SIGNIFICANT CHANGE UP
EOSINOPHIL # BLD AUTO: 0.09 K/UL — SIGNIFICANT CHANGE UP (ref 0–0.5)
EOSINOPHIL NFR BLD AUTO: 1 % — SIGNIFICANT CHANGE UP (ref 0–6)
GLUCOSE SERPL-MCNC: 98 MG/DL — SIGNIFICANT CHANGE UP (ref 70–99)
HCT VFR BLD CALC: 32.7 % — LOW (ref 39–50)
HGB BLD-MCNC: 11.3 G/DL — LOW (ref 13–17)
IMM GRANULOCYTES NFR BLD AUTO: 0.7 % — SIGNIFICANT CHANGE UP (ref 0–1.5)
LYMPHOCYTES # BLD AUTO: 0.94 K/UL — LOW (ref 1–3.3)
LYMPHOCYTES # BLD AUTO: 10.8 % — LOW (ref 13–44)
MAGNESIUM SERPL-MCNC: 1.7 MG/DL — SIGNIFICANT CHANGE UP (ref 1.6–2.6)
MCHC RBC-ENTMCNC: 32.6 PG — SIGNIFICANT CHANGE UP (ref 27–34)
MCHC RBC-ENTMCNC: 34.6 G/DL — SIGNIFICANT CHANGE UP (ref 32–36)
MCV RBC AUTO: 94.2 FL — SIGNIFICANT CHANGE UP (ref 80–100)
MONOCYTES # BLD AUTO: 1.3 K/UL — HIGH (ref 0–0.9)
MONOCYTES NFR BLD AUTO: 15 % — HIGH (ref 2–14)
NEUTROPHILS # BLD AUTO: 6.26 K/UL — SIGNIFICANT CHANGE UP (ref 1.8–7.4)
NEUTROPHILS NFR BLD AUTO: 72.2 % — SIGNIFICANT CHANGE UP (ref 43–77)
NRBC # BLD: 0 /100 WBCS — SIGNIFICANT CHANGE UP (ref 0–0)
PHOSPHATE SERPL-MCNC: 2.4 MG/DL — LOW (ref 2.5–4.5)
PLATELET # BLD AUTO: 68 K/UL — LOW (ref 150–400)
POTASSIUM SERPL-MCNC: 4.1 MMOL/L — SIGNIFICANT CHANGE UP (ref 3.5–5.3)
POTASSIUM SERPL-SCNC: 4.1 MMOL/L — SIGNIFICANT CHANGE UP (ref 3.5–5.3)
PROT SERPL-MCNC: 6.6 GM/DL — SIGNIFICANT CHANGE UP (ref 6–8.3)
RBC # BLD: 3.47 M/UL — LOW (ref 4.2–5.8)
RBC # FLD: 14.2 % — SIGNIFICANT CHANGE UP (ref 10.3–14.5)
SODIUM SERPL-SCNC: 132 MMOL/L — LOW (ref 135–145)
WBC # BLD: 8.68 K/UL — SIGNIFICANT CHANGE UP (ref 3.8–10.5)
WBC # FLD AUTO: 8.68 K/UL — SIGNIFICANT CHANGE UP (ref 3.8–10.5)

## 2022-06-01 PROCEDURE — 99291 CRITICAL CARE FIRST HOUR: CPT

## 2022-06-01 RX ORDER — FOLIC ACID 0.8 MG
1 TABLET ORAL DAILY
Refills: 0 | Status: DISCONTINUED | OUTPATIENT
Start: 2022-06-01 | End: 2022-06-06

## 2022-06-01 RX ORDER — MAGNESIUM SULFATE 500 MG/ML
2 VIAL (ML) INJECTION ONCE
Refills: 0 | Status: COMPLETED | OUTPATIENT
Start: 2022-06-01 | End: 2022-06-01

## 2022-06-01 RX ORDER — PHENOBARBITAL 60 MG
130 TABLET ORAL EVERY 6 HOURS
Refills: 0 | Status: DISCONTINUED | OUTPATIENT
Start: 2022-06-01 | End: 2022-06-02

## 2022-06-01 RX ORDER — ENOXAPARIN SODIUM 100 MG/ML
40 INJECTION SUBCUTANEOUS EVERY 24 HOURS
Refills: 0 | Status: DISCONTINUED | OUTPATIENT
Start: 2022-06-01 | End: 2022-06-14

## 2022-06-01 RX ORDER — PHENOBARBITAL 60 MG
130 TABLET ORAL EVERY 6 HOURS
Refills: 0 | Status: DISCONTINUED | OUTPATIENT
Start: 2022-06-01 | End: 2022-06-01

## 2022-06-01 RX ORDER — PHENOBARBITAL 60 MG
130 TABLET ORAL ONCE
Refills: 0 | Status: DISCONTINUED | OUTPATIENT
Start: 2022-06-01 | End: 2022-06-01

## 2022-06-01 RX ORDER — THIAMINE MONONITRATE (VIT B1) 100 MG
100 TABLET ORAL DAILY
Refills: 0 | Status: DISCONTINUED | OUTPATIENT
Start: 2022-06-01 | End: 2022-06-06

## 2022-06-01 RX ORDER — LEVETIRACETAM 250 MG/1
500 TABLET, FILM COATED ORAL EVERY 12 HOURS
Refills: 0 | Status: DISCONTINUED | OUTPATIENT
Start: 2022-06-01 | End: 2022-06-01

## 2022-06-01 RX ADMIN — CHLORHEXIDINE GLUCONATE 1 APPLICATION(S): 213 SOLUTION TOPICAL at 01:57

## 2022-06-01 RX ADMIN — SODIUM CHLORIDE 100 MILLILITER(S): 9 INJECTION, SOLUTION INTRAVENOUS at 17:10

## 2022-06-01 RX ADMIN — LEVETIRACETAM 420 MILLIGRAM(S): 250 TABLET, FILM COATED ORAL at 05:12

## 2022-06-01 RX ADMIN — Medication 1 PATCH: at 23:11

## 2022-06-01 RX ADMIN — DEXMEDETOMIDINE HYDROCHLORIDE IN 0.9% SODIUM CHLORIDE 2.94 MICROGRAM(S)/KG/HR: 4 INJECTION INTRAVENOUS at 21:21

## 2022-06-01 RX ADMIN — DEXMEDETOMIDINE HYDROCHLORIDE IN 0.9% SODIUM CHLORIDE 2.94 MICROGRAM(S)/KG/HR: 4 INJECTION INTRAVENOUS at 12:23

## 2022-06-01 RX ADMIN — Medication 130 MILLIGRAM(S): at 04:28

## 2022-06-01 RX ADMIN — Medication 25 GRAM(S): at 11:18

## 2022-06-01 RX ADMIN — Medication 1 MILLIGRAM(S): at 17:11

## 2022-06-01 RX ADMIN — DEXMEDETOMIDINE HYDROCHLORIDE IN 0.9% SODIUM CHLORIDE 2.94 MICROGRAM(S)/KG/HR: 4 INJECTION INTRAVENOUS at 01:41

## 2022-06-01 RX ADMIN — DEXMEDETOMIDINE HYDROCHLORIDE IN 0.9% SODIUM CHLORIDE 2.94 MICROGRAM(S)/KG/HR: 4 INJECTION INTRAVENOUS at 08:17

## 2022-06-01 RX ADMIN — Medication 25 GRAM(S): at 04:34

## 2022-06-01 RX ADMIN — Medication 130 MILLIGRAM(S): at 17:12

## 2022-06-01 RX ADMIN — ENOXAPARIN SODIUM 40 MILLIGRAM(S): 100 INJECTION SUBCUTANEOUS at 11:18

## 2022-06-01 RX ADMIN — Medication 130 MILLIGRAM(S): at 01:27

## 2022-06-01 RX ADMIN — Medication 130 MILLIGRAM(S): at 03:42

## 2022-06-01 RX ADMIN — Medication 85 MILLIMOLE(S): at 04:44

## 2022-06-01 RX ADMIN — Medication 130 MILLIGRAM(S): at 11:18

## 2022-06-01 RX ADMIN — DEXMEDETOMIDINE HYDROCHLORIDE IN 0.9% SODIUM CHLORIDE 2.94 MICROGRAM(S)/KG/HR: 4 INJECTION INTRAVENOUS at 06:10

## 2022-06-01 RX ADMIN — Medication 130 MILLIGRAM(S): at 23:36

## 2022-06-01 RX ADMIN — DEXMEDETOMIDINE HYDROCHLORIDE IN 0.9% SODIUM CHLORIDE 2.94 MICROGRAM(S)/KG/HR: 4 INJECTION INTRAVENOUS at 17:10

## 2022-06-01 RX ADMIN — Medication 100 MILLIGRAM(S): at 17:11

## 2022-06-01 NOTE — PROGRESS NOTE ADULT - SUBJECTIVE AND OBJECTIVE BOX
Pt resting comfortably, no acute events.       Physical Exam:   · Neurological	detailed exam  · Mental Status	Awake, lethargic says phrases but not appropriate. confused. AOX1.   · Cranial Nerve	PERRLA Face symmetric, does not track.  · Motor	Moving all extremities spontaneously  Toes downgoing  · Sensory	withdraws equally in all extr       · Assessment	  Seizures without clear history and history of etoh abuse  HTN  Diffuse Cerebral and cerebellar atrophy   right extracranial hematoma       Plan  EEG normal, no seizure focus  no AEDs at this time  MRI Brain w/ and wo  CIWA  MVI  rest as per primary team  will continue follow

## 2022-06-01 NOTE — PROGRESS NOTE ADULT - ASSESSMENT
6)M w/ HTN, EtOH use disorder, seizures. ADmitted w/ seizure likely in setting of alcohol withdrawal. Transferred to ICU for worsening withdrawal symptoms/high CIWA.     - switch to phenobarb ATC w/ PRN, as pt's PO intake is not guaranteed and therefore he is not reliably receiving librium  - continue w/ precedex for agitation, titrate down as tolerated  - on keppra for seizures; neuro recommending no AEDs at this time  - airway monitoring  - continue w/ IVF since poor PO intake; NPO while sedated  - hypoMg and hypophos repleted; keep Mg >2  - continue w/ MVI, thiaime, folate  - lovenox for ppx  - remains in ICU on precedex; full code

## 2022-06-01 NOTE — PROGRESS NOTE ADULT - SUBJECTIVE AND OBJECTIVE BOX
CHIEF COMPLAINT:    Interval Events:    REVIEW OF SYSTEMS:  Constitutional: [ ] fevers [ ] chills [ ] weight loss [ ] weight gain  HEENT: [ ] dry eyes [ ] eye irritation [ ] postnasal drip [ ] nasal congestion  CV: [ ] chest pain [ ] orthopnea [ ] palpitations [ ] murmur  Resp: [ ] cough [ ] shortness of breath [ ] dyspnea [ ] wheezing [ ] sputum [ ] hemoptysis  GI: [ ] nausea [ ] vomiting [ ] diarrhea [ ] constipation [ ] abd pain [ ] dysphagia   : [ ] dysuria [ ] nocturia [ ] hematuria [ ] increased urinary frequency  Musculoskeletal: [ ] back pain [ ] myalgias [ ] arthralgias [ ] fracture  Skin: [ ] rash [ ] itch  Neurological: [ ] headache [ ] dizziness [ ] syncope [ ] weakness [ ] numbness  Hematologic/Lymphatic: [ ] anemia [ ] bleeding problem  Allergic/Immunologic: [ ] itchy eyes [ ] nasal discharge [ ] hives [ ] angioedema  [ ] All other systems negative  [ ] Unable to assess ROS because ________    OBJECTIVE:  ICU Vital Signs Last 24 Hrs  T(C): 37.1 (01 Jun 2022 05:00), Max: 37.4 (31 May 2022 08:00)  T(F): 98.7 (01 Jun 2022 05:00), Max: 99.3 (31 May 2022 08:00)  HR: 80 (01 Jun 2022 07:00) (80 - 111)  BP: 122/81 (01 Jun 2022 07:00) (100/63 - 183/113)  BP(mean): 94 (01 Jun 2022 07:00) (75 - 143)  ABP: --  ABP(mean): --  RR: 15 (01 Jun 2022 07:00) (0 - 42)  SpO2: 96% (01 Jun 2022 07:00) (87% - 100%)        05-31 @ 07:01  -  06-01 @ 07:00  --------------------------------------------------------  IN: 3634.7 mL / OUT: 2100 mL / NET: 1534.7 mL    06-01 @ 07:01  -  06-01 @ 07:56  --------------------------------------------------------  IN: 113.2 mL / OUT: 0 mL / NET: 113.2 mL      CAPILLARY BLOOD GLUCOSE          PHYSICAL EXAM:  General:   HEENT:   Neck:   Respiratory:   Cardiovascular:   Abdomen:   Extremities:   Skin:   Neurological:  Psychiatry:    LINES:    HOSPITAL MEDICATIONS:  MEDICATIONS  (STANDING):  chlordiazePOXIDE   Oral   chlordiazePOXIDE 100 milliGRAM(s) Oral every 6 hours  chlorhexidine 2% Cloths 1 Application(s) Topical daily  dexMEDEtomidine Infusion 0.2 MICROgram(s)/kG/Hr (2.94 mL/Hr) IV Continuous <Continuous>  folic acid 1 milliGRAM(s) Oral daily  lactated ringers. 1000 milliLiter(s) (100 mL/Hr) IV Continuous <Continuous>  levETIRAcetam  IVPB 500 milliGRAM(s) IV Intermittent every 12 hours  multivitamin 1 Tablet(s) Oral daily  thiamine 100 milliGRAM(s) Oral daily    MEDICATIONS  (PRN):  acetaminophen     Tablet .. 650 milliGRAM(s) Oral every 6 hours PRN Temp greater or equal to 38C (100.4F), Mild Pain (1 - 3)  ondansetron Injectable 4 milliGRAM(s) IV Push every 8 hours PRN Nausea and/or Vomiting  PHENobarbital Injectable 130 milliGRAM(s) IV Push every 2 hours PRN agitation, aggression      LABS:                        11.3   8.68  )-----------( 68       ( 01 Jun 2022 02:36 )             32.7     Hgb Trend: 11.3<--, 11.5<--, 10.9<--, 12.2<--, 12.9<--  06-01    132<L>  |  100  |  10  ----------------------------<  98  4.1   |  25  |  0.96    Ca    8.6      01 Jun 2022 02:36  Phos  2.4     06-01  Mg     1.7     06-01    TPro  6.6  /  Alb  2.4<L>  /  TBili  0.4  /  DBili  x   /  AST  33  /  ALT  23  /  AlkPhos  80  06-01        Arterial Blood Gas:  05-30 @ 08:47  7.41/42/96/27/--/1.7  ABG lactate: --        MICROBIOLOGY:     RADIOLOGY:  [ ] Reviewed and interpreted by me CHIEF COMPLAINT:    Interval Events:  received multiple doses of PRN phenobarb o/n    REVIEW OF SYSTEMS:  [x ] Unable to assess ROS because lethargic    OBJECTIVE:  ICU Vital Signs Last 24 Hrs  T(C): 37.1 (01 Jun 2022 05:00), Max: 37.4 (31 May 2022 08:00)  T(F): 98.7 (01 Jun 2022 05:00), Max: 99.3 (31 May 2022 08:00)  HR: 80 (01 Jun 2022 07:00) (80 - 111)  BP: 122/81 (01 Jun 2022 07:00) (100/63 - 183/113)  BP(mean): 94 (01 Jun 2022 07:00) (75 - 143)  ABP: --  ABP(mean): --  RR: 15 (01 Jun 2022 07:00) (0 - 42)  SpO2: 96% (01 Jun 2022 07:00) (87% - 100%)        05-31 @ 07:01 - 06-01 @ 07:00  --------------------------------------------------------  IN: 3634.7 mL / OUT: 2100 mL / NET: 1534.7 mL    06-01 @ 07:01  - 06-01 @ 07:56  --------------------------------------------------------  IN: 113.2 mL / OUT: 0 mL / NET: 113.2 mL      CAPILLARY BLOOD GLUCOSE          PHYSICAL EXAM:  General: NAD, non toxic appearing  HEENT: MMM  Neck: supple  Respiratory: CTA b/l  Cardiovascular: s1s2 RRR  Abdomen: soft, non tender, non distended  Extremities: warm, no edema or clubbing  Skin: intact   Neurological: moves all extremities  Psychiatry: lethargic    LINES:  [facility]    HOSPITAL MEDICATIONS:  MEDICATIONS  (STANDING):  chlordiazePOXIDE   Oral   chlordiazePOXIDE 100 milliGRAM(s) Oral every 6 hours  chlorhexidine 2% Cloths 1 Application(s) Topical daily  dexMEDEtomidine Infusion 0.2 MICROgram(s)/kG/Hr (2.94 mL/Hr) IV Continuous <Continuous>  folic acid 1 milliGRAM(s) Oral daily  lactated ringers. 1000 milliLiter(s) (100 mL/Hr) IV Continuous <Continuous>  levETIRAcetam  IVPB 500 milliGRAM(s) IV Intermittent every 12 hours  multivitamin 1 Tablet(s) Oral daily  thiamine 100 milliGRAM(s) Oral daily    MEDICATIONS  (PRN):  acetaminophen     Tablet .. 650 milliGRAM(s) Oral every 6 hours PRN Temp greater or equal to 38C (100.4F), Mild Pain (1 - 3)  ondansetron Injectable 4 milliGRAM(s) IV Push every 8 hours PRN Nausea and/or Vomiting  PHENobarbital Injectable 130 milliGRAM(s) IV Push every 2 hours PRN agitation, aggression      LABS:                        11.3   8.68  )-----------( 68       ( 01 Jun 2022 02:36 )             32.7     Hgb Trend: 11.3<--, 11.5<--, 10.9<--, 12.2<--, 12.9<--  06-01    132<L>  |  100  |  10  ----------------------------<  98  4.1   |  25  |  0.96    Ca    8.6      01 Jun 2022 02:36  Phos  2.4     06-01  Mg     1.7     06-01    TPro  6.6  /  Alb  2.4<L>  /  TBili  0.4  /  DBili  x   /  AST  33  /  ALT  23  /  AlkPhos  80  06-01        Arterial Blood Gas:  05-30 @ 08:47  7.41/42/96/27/--/1.7  ABG lactate: --        MICROBIOLOGY:     RADIOLOGY:  [ ] Reviewed and interpreted by me

## 2022-06-02 LAB
ALBUMIN SERPL ELPH-MCNC: 2.1 G/DL — LOW (ref 3.3–5)
ALP SERPL-CCNC: 78 U/L — SIGNIFICANT CHANGE UP (ref 40–120)
ALT FLD-CCNC: 18 U/L — SIGNIFICANT CHANGE UP (ref 12–78)
ANION GAP SERPL CALC-SCNC: 9 MMOL/L — SIGNIFICANT CHANGE UP (ref 5–17)
AST SERPL-CCNC: 21 U/L — SIGNIFICANT CHANGE UP (ref 15–37)
BASOPHILS # BLD AUTO: 0.02 K/UL — SIGNIFICANT CHANGE UP (ref 0–0.2)
BASOPHILS NFR BLD AUTO: 0.3 % — SIGNIFICANT CHANGE UP (ref 0–2)
BILIRUB SERPL-MCNC: 0.3 MG/DL — SIGNIFICANT CHANGE UP (ref 0.2–1.2)
BUN SERPL-MCNC: 5 MG/DL — LOW (ref 7–23)
CALCIUM SERPL-MCNC: 8.7 MG/DL — SIGNIFICANT CHANGE UP (ref 8.5–10.1)
CHLORIDE SERPL-SCNC: 104 MMOL/L — SIGNIFICANT CHANGE UP (ref 96–108)
CO2 SERPL-SCNC: 23 MMOL/L — SIGNIFICANT CHANGE UP (ref 22–31)
CREAT SERPL-MCNC: 0.68 MG/DL — SIGNIFICANT CHANGE UP (ref 0.5–1.3)
EGFR: 121 ML/MIN/1.73M2 — SIGNIFICANT CHANGE UP
EOSINOPHIL # BLD AUTO: 0.07 K/UL — SIGNIFICANT CHANGE UP (ref 0–0.5)
EOSINOPHIL NFR BLD AUTO: 1.1 % — SIGNIFICANT CHANGE UP (ref 0–6)
GLUCOSE SERPL-MCNC: 90 MG/DL — SIGNIFICANT CHANGE UP (ref 70–99)
HCT VFR BLD CALC: 32.8 % — LOW (ref 39–50)
HGB BLD-MCNC: 11.4 G/DL — LOW (ref 13–17)
IMM GRANULOCYTES NFR BLD AUTO: 0.3 % — SIGNIFICANT CHANGE UP (ref 0–1.5)
LYMPHOCYTES # BLD AUTO: 0.59 K/UL — LOW (ref 1–3.3)
LYMPHOCYTES # BLD AUTO: 9.6 % — LOW (ref 13–44)
MAGNESIUM SERPL-MCNC: 1.9 MG/DL — SIGNIFICANT CHANGE UP (ref 1.6–2.6)
MCHC RBC-ENTMCNC: 32.3 PG — SIGNIFICANT CHANGE UP (ref 27–34)
MCHC RBC-ENTMCNC: 34.8 G/DL — SIGNIFICANT CHANGE UP (ref 32–36)
MCV RBC AUTO: 92.9 FL — SIGNIFICANT CHANGE UP (ref 80–100)
MONOCYTES # BLD AUTO: 1.15 K/UL — HIGH (ref 0–0.9)
MONOCYTES NFR BLD AUTO: 18.7 % — HIGH (ref 2–14)
NEUTROPHILS # BLD AUTO: 4.29 K/UL — SIGNIFICANT CHANGE UP (ref 1.8–7.4)
NEUTROPHILS NFR BLD AUTO: 70 % — SIGNIFICANT CHANGE UP (ref 43–77)
NRBC # BLD: 0 /100 WBCS — SIGNIFICANT CHANGE UP (ref 0–0)
PHENOBARB SERPL-MCNC: 57.8 UG/ML — CRITICAL HIGH (ref 15–40)
PHOSPHATE SERPL-MCNC: 3.4 MG/DL — SIGNIFICANT CHANGE UP (ref 2.5–4.5)
PLATELET # BLD AUTO: 90 K/UL — LOW (ref 150–400)
POTASSIUM SERPL-MCNC: 3.9 MMOL/L — SIGNIFICANT CHANGE UP (ref 3.5–5.3)
POTASSIUM SERPL-SCNC: 3.9 MMOL/L — SIGNIFICANT CHANGE UP (ref 3.5–5.3)
PROT SERPL-MCNC: 6.4 GM/DL — SIGNIFICANT CHANGE UP (ref 6–8.3)
RBC # BLD: 3.53 M/UL — LOW (ref 4.2–5.8)
RBC # FLD: 14 % — SIGNIFICANT CHANGE UP (ref 10.3–14.5)
SODIUM SERPL-SCNC: 136 MMOL/L — SIGNIFICANT CHANGE UP (ref 135–145)
WBC # BLD: 6.14 K/UL — SIGNIFICANT CHANGE UP (ref 3.8–10.5)
WBC # FLD AUTO: 6.14 K/UL — SIGNIFICANT CHANGE UP (ref 3.8–10.5)

## 2022-06-02 PROCEDURE — 99291 CRITICAL CARE FIRST HOUR: CPT

## 2022-06-02 RX ORDER — PHENOBARBITAL 60 MG
65 TABLET ORAL EVERY 6 HOURS
Refills: 0 | Status: DISCONTINUED | OUTPATIENT
Start: 2022-06-02 | End: 2022-06-06

## 2022-06-02 RX ORDER — POTASSIUM CHLORIDE 20 MEQ
10 PACKET (EA) ORAL ONCE
Refills: 0 | Status: COMPLETED | OUTPATIENT
Start: 2022-06-02 | End: 2022-06-02

## 2022-06-02 RX ORDER — HYDRALAZINE HCL 50 MG
10 TABLET ORAL EVERY 6 HOURS
Refills: 0 | Status: DISCONTINUED | OUTPATIENT
Start: 2022-06-02 | End: 2022-06-10

## 2022-06-02 RX ORDER — MAGNESIUM SULFATE 500 MG/ML
1 VIAL (ML) INJECTION ONCE
Refills: 0 | Status: COMPLETED | OUTPATIENT
Start: 2022-06-02 | End: 2022-06-02

## 2022-06-02 RX ADMIN — DEXMEDETOMIDINE HYDROCHLORIDE IN 0.9% SODIUM CHLORIDE 2.94 MICROGRAM(S)/KG/HR: 4 INJECTION INTRAVENOUS at 10:44

## 2022-06-02 RX ADMIN — Medication 65 MILLIGRAM(S): at 06:31

## 2022-06-02 RX ADMIN — Medication 1 MILLIGRAM(S): at 17:29

## 2022-06-02 RX ADMIN — CHLORHEXIDINE GLUCONATE 1 APPLICATION(S): 213 SOLUTION TOPICAL at 04:30

## 2022-06-02 RX ADMIN — Medication 10 MILLIGRAM(S): at 11:27

## 2022-06-02 RX ADMIN — Medication 65 MILLIGRAM(S): at 11:29

## 2022-06-02 RX ADMIN — Medication 65 MILLIGRAM(S): at 17:28

## 2022-06-02 RX ADMIN — SODIUM CHLORIDE 100 MILLILITER(S): 9 INJECTION, SOLUTION INTRAVENOUS at 17:28

## 2022-06-02 RX ADMIN — DEXMEDETOMIDINE HYDROCHLORIDE IN 0.9% SODIUM CHLORIDE 2.94 MICROGRAM(S)/KG/HR: 4 INJECTION INTRAVENOUS at 17:10

## 2022-06-02 RX ADMIN — DEXMEDETOMIDINE HYDROCHLORIDE IN 0.9% SODIUM CHLORIDE 2.94 MICROGRAM(S)/KG/HR: 4 INJECTION INTRAVENOUS at 07:19

## 2022-06-02 RX ADMIN — Medication 100 MILLIGRAM(S): at 17:10

## 2022-06-02 RX ADMIN — Medication 1 PATCH: at 23:47

## 2022-06-02 RX ADMIN — Medication 1 PATCH: at 07:24

## 2022-06-02 RX ADMIN — Medication 10 MILLIGRAM(S): at 18:09

## 2022-06-02 RX ADMIN — DEXMEDETOMIDINE HYDROCHLORIDE IN 0.9% SODIUM CHLORIDE 2.94 MICROGRAM(S)/KG/HR: 4 INJECTION INTRAVENOUS at 05:22

## 2022-06-02 RX ADMIN — Medication 100 GRAM(S): at 07:20

## 2022-06-02 RX ADMIN — DEXMEDETOMIDINE HYDROCHLORIDE IN 0.9% SODIUM CHLORIDE 2.94 MICROGRAM(S)/KG/HR: 4 INJECTION INTRAVENOUS at 01:35

## 2022-06-02 RX ADMIN — ENOXAPARIN SODIUM 40 MILLIGRAM(S): 100 INJECTION SUBCUTANEOUS at 11:27

## 2022-06-02 RX ADMIN — Medication 100 MILLIEQUIVALENT(S): at 08:53

## 2022-06-02 RX ADMIN — DEXMEDETOMIDINE HYDROCHLORIDE IN 0.9% SODIUM CHLORIDE 2.94 MICROGRAM(S)/KG/HR: 4 INJECTION INTRAVENOUS at 21:44

## 2022-06-02 RX ADMIN — DEXMEDETOMIDINE HYDROCHLORIDE IN 0.9% SODIUM CHLORIDE 2.94 MICROGRAM(S)/KG/HR: 4 INJECTION INTRAVENOUS at 17:30

## 2022-06-02 RX ADMIN — Medication 1 PATCH: at 19:25

## 2022-06-02 RX ADMIN — SODIUM CHLORIDE 100 MILLILITER(S): 9 INJECTION, SOLUTION INTRAVENOUS at 07:20

## 2022-06-02 NOTE — PROGRESS NOTE ADULT - SUBJECTIVE AND OBJECTIVE BOX
CHIEF COMPLAINT:    Interval Events:    REVIEW OF SYSTEMS:  Constitutional: [ ] fevers [ ] chills [ ] weight loss [ ] weight gain  HEENT: [ ] dry eyes [ ] eye irritation [ ] postnasal drip [ ] nasal congestion  CV: [ ] chest pain [ ] orthopnea [ ] palpitations [ ] murmur  Resp: [ ] cough [ ] shortness of breath [ ] dyspnea [ ] wheezing [ ] sputum [ ] hemoptysis  GI: [ ] nausea [ ] vomiting [ ] diarrhea [ ] constipation [ ] abd pain [ ] dysphagia   : [ ] dysuria [ ] nocturia [ ] hematuria [ ] increased urinary frequency  Musculoskeletal: [ ] back pain [ ] myalgias [ ] arthralgias [ ] fracture  Skin: [ ] rash [ ] itch  Neurological: [ ] headache [ ] dizziness [ ] syncope [ ] weakness [ ] numbness  Hematologic/Lymphatic: [ ] anemia [ ] bleeding problem  Allergic/Immunologic: [ ] itchy eyes [ ] nasal discharge [ ] hives [ ] angioedema  [ ] All other systems negative  [ ] Unable to assess ROS because ________    OBJECTIVE:  ICU Vital Signs Last 24 Hrs  T(C): 37.1 (02 Jun 2022 07:00), Max: 37.3 (01 Jun 2022 23:00)  T(F): 98.8 (02 Jun 2022 07:00), Max: 99.2 (01 Jun 2022 23:00)  HR: 86 (02 Jun 2022 07:00) (74 - 93)  BP: 156/106 (02 Jun 2022 07:00) (117/86 - 180/116)  BP(mean): 121 (02 Jun 2022 07:00) (97 - 136)  ABP: --  ABP(mean): --  RR: 19 (02 Jun 2022 07:00) (10 - 27)  SpO2: 100% (02 Jun 2022 07:00) (98% - 100%)        06-01 @ 07:01  -  06-02 @ 07:00  --------------------------------------------------------  IN: 2694.6 mL / OUT: 3000 mL / NET: -305.4 mL    06-02 @ 07:01  -  06-02 @ 08:04  --------------------------------------------------------  IN: 6.6 mL / OUT: 0 mL / NET: 6.6 mL      CAPILLARY BLOOD GLUCOSE          PHYSICAL EXAM:  General:   HEENT:   Neck:   Respiratory:   Cardiovascular:   Abdomen:   Extremities:   Skin:   Neurological:  Psychiatry:    LINES:    HOSPITAL MEDICATIONS:  MEDICATIONS  (STANDING):  chlorhexidine 2% Cloths 1 Application(s) Topical daily  cloNIDine Patch 0.1 mG/24Hr(s) 1 patch Transdermal every 7 days  dexMEDEtomidine Infusion 0.2 MICROgram(s)/kG/Hr (2.94 mL/Hr) IV Continuous <Continuous>  enoxaparin Injectable 40 milliGRAM(s) SubCutaneous every 24 hours  folic acid Injectable 1 milliGRAM(s) IV Push daily  lactated ringers. 1000 milliLiter(s) (100 mL/Hr) IV Continuous <Continuous>  PHENobarbital Injectable 65 milliGRAM(s) IV Push every 6 hours  potassium chloride  10 mEq/100 mL IVPB 10 milliEquivalent(s) IV Intermittent once  thiamine Injectable 100 milliGRAM(s) IV Push daily    MEDICATIONS  (PRN):  acetaminophen     Tablet .. 650 milliGRAM(s) Oral every 6 hours PRN Temp greater or equal to 38C (100.4F), Mild Pain (1 - 3)  ondansetron Injectable 4 milliGRAM(s) IV Push every 8 hours PRN Nausea and/or Vomiting      LABS:                        11.4   6.14  )-----------( 90       ( 02 Jun 2022 04:06 )             32.8     Hgb Trend: 11.4<--, 11.3<--, 11.5<--, 10.9<--, 12.2<--  06-02    136  |  104  |  5<L>  ----------------------------<  90  3.9   |  23  |  0.68    Ca    8.7      02 Jun 2022 04:06  Phos  3.4     06-02  Mg     1.9     06-02    TPro  6.4  /  Alb  2.1<L>  /  TBili  0.3  /  DBili  x   /  AST  21  /  ALT  18  /  AlkPhos  78  06-02              MICROBIOLOGY:     RADIOLOGY:  [ ] Reviewed and interpreted by me CHIEF COMPLAINT:    Interval Events:  started on clonidine patch for htn  phenobarb dose decreased for elevated phenobarb level  precedex turned up for agitation/throwing breakfast tray this morning    REVIEW OF SYSTEMS:  [ x] Unable to assess ROS because sedated    OBJECTIVE:  ICU Vital Signs Last 24 Hrs  T(C): 37.1 (02 Jun 2022 07:00), Max: 37.3 (01 Jun 2022 23:00)  T(F): 98.8 (02 Jun 2022 07:00), Max: 99.2 (01 Jun 2022 23:00)  HR: 86 (02 Jun 2022 07:00) (74 - 93)  BP: 156/106 (02 Jun 2022 07:00) (117/86 - 180/116)  BP(mean): 121 (02 Jun 2022 07:00) (97 - 136)  ABP: --  ABP(mean): --  RR: 19 (02 Jun 2022 07:00) (10 - 27)  SpO2: 100% (02 Jun 2022 07:00) (98% - 100%)        06-01 @ 07:01  -  06-02 @ 07:00  --------------------------------------------------------  IN: 2694.6 mL / OUT: 3000 mL / NET: -305.4 mL    06-02 @ 07:01  -  06-02 @ 08:04  --------------------------------------------------------  IN: 6.6 mL / OUT: 0 mL / NET: 6.6 mL      CAPILLARY BLOOD GLUCOSE          PHYSICAL EXAM:  General: NAD, non toxic appearing  HEENT: MMM  Neck: supple  Respiratory: CTA b/l  Cardiovascular: s1s2 RRR  Abdomen: soft, non tender, non distended  Extremities: warm, no edema or clubbing  Skin: intact   Neurological: moves all extremities  Psychiatry: lethargic    LINES:  [facility]    HOSPITAL MEDICATIONS:  MEDICATIONS  (STANDING):  chlorhexidine 2% Cloths 1 Application(s) Topical daily  cloNIDine Patch 0.1 mG/24Hr(s) 1 patch Transdermal every 7 days  dexMEDEtomidine Infusion 0.2 MICROgram(s)/kG/Hr (2.94 mL/Hr) IV Continuous <Continuous>  enoxaparin Injectable 40 milliGRAM(s) SubCutaneous every 24 hours  folic acid Injectable 1 milliGRAM(s) IV Push daily  lactated ringers. 1000 milliLiter(s) (100 mL/Hr) IV Continuous <Continuous>  PHENobarbital Injectable 65 milliGRAM(s) IV Push every 6 hours  potassium chloride  10 mEq/100 mL IVPB 10 milliEquivalent(s) IV Intermittent once  thiamine Injectable 100 milliGRAM(s) IV Push daily    MEDICATIONS  (PRN):  acetaminophen     Tablet .. 650 milliGRAM(s) Oral every 6 hours PRN Temp greater or equal to 38C (100.4F), Mild Pain (1 - 3)  ondansetron Injectable 4 milliGRAM(s) IV Push every 8 hours PRN Nausea and/or Vomiting      LABS:                        11.4   6.14  )-----------( 90       ( 02 Jun 2022 04:06 )             32.8     Hgb Trend: 11.4<--, 11.3<--, 11.5<--, 10.9<--, 12.2<--  06-02    136  |  104  |  5<L>  ----------------------------<  90  3.9   |  23  |  0.68    Ca    8.7      02 Jun 2022 04:06  Phos  3.4     06-02  Mg     1.9     06-02    TPro  6.4  /  Alb  2.1<L>  /  TBili  0.3  /  DBili  x   /  AST  21  /  ALT  18  /  AlkPhos  78  06-02              MICROBIOLOGY:     RADIOLOGY:  [ ] Reviewed and interpreted by me

## 2022-06-02 NOTE — DIETITIAN INITIAL EVALUATION ADULT - PERTINENT MEDS FT
MEDICATIONS  (STANDING):  chlorhexidine 2% Cloths 1 Application(s) Topical daily  cloNIDine Patch 0.1 mG/24Hr(s) 1 patch Transdermal every 7 days  dexMEDEtomidine Infusion 0.2 MICROgram(s)/kG/Hr (2.94 mL/Hr) IV Continuous <Continuous>  enoxaparin Injectable 40 milliGRAM(s) SubCutaneous every 24 hours  folic acid Injectable 1 milliGRAM(s) IV Push daily  lactated ringers. 1000 milliLiter(s) (100 mL/Hr) IV Continuous <Continuous>  PHENobarbital Injectable 65 milliGRAM(s) IV Push every 6 hours  thiamine Injectable 100 milliGRAM(s) IV Push daily    MEDICATIONS  (PRN):  acetaminophen     Tablet .. 650 milliGRAM(s) Oral every 6 hours PRN Temp greater or equal to 38C (100.4F), Mild Pain (1 - 3)  hydrALAZINE Injectable 10 milliGRAM(s) IV Push every 6 hours PRN sbp > 160  ondansetron Injectable 4 milliGRAM(s) IV Push every 8 hours PRN Nausea and/or Vomiting

## 2022-06-02 NOTE — PROGRESS NOTE ADULT - ASSESSMENT
6)M w/ HTN, EtOH use disorder, seizures. ADmitted w/ seizure likely in setting of alcohol withdrawal. Transferred to ICU for worsening withdrawal symptoms/high CIWA.     - continue w/ phenobarb ATC, dose lowered ue to elevated phenobarb level   - continue w/ precedex for agitation, titrate down as tolerated  - off keppra  - airway monitoring  - started on clonidine patch for HTN o/n, will add PRN IVP hydralazine for SBP >160 since not taking PO intake reliably to treat HTN  - continue w/ IVF since poor PO intake; NPO while sedated  - hypoMg repleted; keep Mg >2  - continue w/ MVI, thiaime, folate  - lovenox for ppx  - remains in ICU on precedex; full code

## 2022-06-02 NOTE — DIETITIAN INITIAL EVALUATION ADULT - PERTINENT LABORATORY DATA
06-02    136  |  104  |  5<L>  ----------------------------<  90  3.9   |  23  |  0.68    Ca    8.7      02 Jun 2022 04:06  Phos  3.4     06-02  Mg     1.9     06-02    TPro  6.4  /  Alb  2.1<L>  /  TBili  0.3  /  DBili  x   /  AST  21  /  ALT  18  /  AlkPhos  78  06-02

## 2022-06-02 NOTE — PROGRESS NOTE ADULT - SUBJECTIVE AND OBJECTIVE BOX
Pt resting comfortably, no acute events.     Physical Exam:   · Neurological	detailed exam  · Mental Status	Awake, lethargic, more appropriate language intact. confused. AOX1.   · Cranial Nerve	PERRLA Face symmetric, does not track.  · Motor	Moving all extremities spontaneously  Toes downgoing  · Sensory	withdraws equally in all extr       · Assessment	  Seizures without clear history and history of etoh abuse  HTN  Diffuse Cerebral and cerebellar atrophy   right extracranial hematoma       Plan  EEG normal, no seizure focus  no AEDs at this time  MRI Brain w/ and wo  CIWA  MVI  rest as per primary team  will continue follow

## 2022-06-02 NOTE — DIETITIAN INITIAL EVALUATION ADULT - ENERGY INTAKE
Pt c 50->% oral intake from 05/29-> 05/31 as per documentation, depressed oral intake due to sedation, agitation at present.  Fair (50-75%)

## 2022-06-03 LAB
ALBUMIN SERPL ELPH-MCNC: 1.9 G/DL — LOW (ref 3.3–5)
ALP SERPL-CCNC: 71 U/L — SIGNIFICANT CHANGE UP (ref 40–120)
ALT FLD-CCNC: 16 U/L — SIGNIFICANT CHANGE UP (ref 12–78)
ANION GAP SERPL CALC-SCNC: 10 MMOL/L — SIGNIFICANT CHANGE UP (ref 5–17)
AST SERPL-CCNC: 19 U/L — SIGNIFICANT CHANGE UP (ref 15–37)
BASOPHILS # BLD AUTO: 0 K/UL — SIGNIFICANT CHANGE UP (ref 0–0.2)
BASOPHILS NFR BLD AUTO: 0 % — SIGNIFICANT CHANGE UP (ref 0–2)
BILIRUB SERPL-MCNC: 0.3 MG/DL — SIGNIFICANT CHANGE UP (ref 0.2–1.2)
BUN SERPL-MCNC: 9 MG/DL — SIGNIFICANT CHANGE UP (ref 7–23)
CALCIUM SERPL-MCNC: 8.6 MG/DL — SIGNIFICANT CHANGE UP (ref 8.5–10.1)
CHLORIDE SERPL-SCNC: 104 MMOL/L — SIGNIFICANT CHANGE UP (ref 96–108)
CK SERPL-CCNC: 114 U/L — SIGNIFICANT CHANGE UP (ref 26–308)
CO2 SERPL-SCNC: 21 MMOL/L — LOW (ref 22–31)
CREAT SERPL-MCNC: 0.72 MG/DL — SIGNIFICANT CHANGE UP (ref 0.5–1.3)
EGFR: 118 ML/MIN/1.73M2 — SIGNIFICANT CHANGE UP
EOSINOPHIL # BLD AUTO: 0 K/UL — SIGNIFICANT CHANGE UP (ref 0–0.5)
EOSINOPHIL NFR BLD AUTO: 0 % — SIGNIFICANT CHANGE UP (ref 0–6)
GLUCOSE SERPL-MCNC: 82 MG/DL — SIGNIFICANT CHANGE UP (ref 70–99)
HCT VFR BLD CALC: 33 % — LOW (ref 39–50)
HGB BLD-MCNC: 11.4 G/DL — LOW (ref 13–17)
LG PLATELETS BLD QL AUTO: SLIGHT — SIGNIFICANT CHANGE UP
LYMPHOCYTES # BLD AUTO: 0.57 K/UL — LOW (ref 1–3.3)
LYMPHOCYTES # BLD AUTO: 10 % — LOW (ref 13–44)
MAGNESIUM SERPL-MCNC: 2 MG/DL — SIGNIFICANT CHANGE UP (ref 1.6–2.6)
MANUAL SMEAR VERIFICATION: SIGNIFICANT CHANGE UP
MCHC RBC-ENTMCNC: 32.4 PG — SIGNIFICANT CHANGE UP (ref 27–34)
MCHC RBC-ENTMCNC: 34.5 G/DL — SIGNIFICANT CHANGE UP (ref 32–36)
MCV RBC AUTO: 93.8 FL — SIGNIFICANT CHANGE UP (ref 80–100)
MONOCYTES # BLD AUTO: 1.02 K/UL — HIGH (ref 0–0.9)
MONOCYTES NFR BLD AUTO: 18 % — HIGH (ref 2–14)
NEUTROPHILS # BLD AUTO: 4.1 K/UL — SIGNIFICANT CHANGE UP (ref 1.8–7.4)
NEUTROPHILS NFR BLD AUTO: 70 % — SIGNIFICANT CHANGE UP (ref 43–77)
NEUTS BAND # BLD: 2 % — SIGNIFICANT CHANGE UP (ref 0–8)
NRBC # BLD: 0 /100 — SIGNIFICANT CHANGE UP (ref 0–0)
NRBC # BLD: SIGNIFICANT CHANGE UP /100 WBCS (ref 0–0)
PHENOBARB SERPL-MCNC: 53.6 UG/ML — CRITICAL HIGH (ref 15–40)
PLAT MORPH BLD: NORMAL — SIGNIFICANT CHANGE UP
PLATELET # BLD AUTO: 126 K/UL — LOW (ref 150–400)
POTASSIUM SERPL-MCNC: 3.9 MMOL/L — SIGNIFICANT CHANGE UP (ref 3.5–5.3)
POTASSIUM SERPL-SCNC: 3.9 MMOL/L — SIGNIFICANT CHANGE UP (ref 3.5–5.3)
PROT SERPL-MCNC: 6 GM/DL — SIGNIFICANT CHANGE UP (ref 6–8.3)
RBC # BLD: 3.52 M/UL — LOW (ref 4.2–5.8)
RBC # FLD: 14.5 % — SIGNIFICANT CHANGE UP (ref 10.3–14.5)
RBC BLD AUTO: NORMAL — SIGNIFICANT CHANGE UP
SODIUM SERPL-SCNC: 135 MMOL/L — SIGNIFICANT CHANGE UP (ref 135–145)
WBC # BLD: 5.69 K/UL — SIGNIFICANT CHANGE UP (ref 3.8–10.5)
WBC # FLD AUTO: 5.69 K/UL — SIGNIFICANT CHANGE UP (ref 3.8–10.5)

## 2022-06-03 PROCEDURE — 73070 X-RAY EXAM OF ELBOW: CPT | Mod: 26,RT

## 2022-06-03 PROCEDURE — 73090 X-RAY EXAM OF FOREARM: CPT | Mod: 26,RT

## 2022-06-03 PROCEDURE — 99291 CRITICAL CARE FIRST HOUR: CPT

## 2022-06-03 PROCEDURE — 93971 EXTREMITY STUDY: CPT | Mod: 26,RT

## 2022-06-03 PROCEDURE — 73130 X-RAY EXAM OF HAND: CPT | Mod: 26,RT

## 2022-06-03 RX ORDER — MORPHINE SULFATE 50 MG/1
2 CAPSULE, EXTENDED RELEASE ORAL ONCE
Refills: 0 | Status: DISCONTINUED | OUTPATIENT
Start: 2022-06-03 | End: 2022-06-03

## 2022-06-03 RX ORDER — ACETAMINOPHEN 500 MG
1000 TABLET ORAL ONCE
Refills: 0 | Status: COMPLETED | OUTPATIENT
Start: 2022-06-03 | End: 2022-06-03

## 2022-06-03 RX ADMIN — Medication 10 MILLIGRAM(S): at 20:22

## 2022-06-03 RX ADMIN — Medication 400 MILLIGRAM(S): at 23:11

## 2022-06-03 RX ADMIN — Medication 650 MILLIGRAM(S): at 06:14

## 2022-06-03 RX ADMIN — DEXMEDETOMIDINE HYDROCHLORIDE IN 0.9% SODIUM CHLORIDE 2.94 MICROGRAM(S)/KG/HR: 4 INJECTION INTRAVENOUS at 01:29

## 2022-06-03 RX ADMIN — ENOXAPARIN SODIUM 40 MILLIGRAM(S): 100 INJECTION SUBCUTANEOUS at 11:59

## 2022-06-03 RX ADMIN — Medication 1 MILLIGRAM(S): at 18:53

## 2022-06-03 RX ADMIN — Medication 1000 MILLIGRAM(S): at 23:28

## 2022-06-03 RX ADMIN — SODIUM CHLORIDE 100 MILLILITER(S): 9 INJECTION, SOLUTION INTRAVENOUS at 05:44

## 2022-06-03 RX ADMIN — Medication 100 MILLIGRAM(S): at 18:54

## 2022-06-03 RX ADMIN — DEXMEDETOMIDINE HYDROCHLORIDE IN 0.9% SODIUM CHLORIDE 2.94 MICROGRAM(S)/KG/HR: 4 INJECTION INTRAVENOUS at 07:51

## 2022-06-03 RX ADMIN — MORPHINE SULFATE 2 MILLIGRAM(S): 50 CAPSULE, EXTENDED RELEASE ORAL at 21:40

## 2022-06-03 RX ADMIN — Medication 10 MILLIGRAM(S): at 15:29

## 2022-06-03 RX ADMIN — DEXMEDETOMIDINE HYDROCHLORIDE IN 0.9% SODIUM CHLORIDE 2.94 MICROGRAM(S)/KG/HR: 4 INJECTION INTRAVENOUS at 14:38

## 2022-06-03 RX ADMIN — Medication 65 MILLIGRAM(S): at 18:53

## 2022-06-03 RX ADMIN — Medication 65 MILLIGRAM(S): at 12:00

## 2022-06-03 RX ADMIN — DEXMEDETOMIDINE HYDROCHLORIDE IN 0.9% SODIUM CHLORIDE 2.94 MICROGRAM(S)/KG/HR: 4 INJECTION INTRAVENOUS at 20:05

## 2022-06-03 RX ADMIN — Medication 65 MILLIGRAM(S): at 01:29

## 2022-06-03 RX ADMIN — Medication 650 MILLIGRAM(S): at 05:44

## 2022-06-03 RX ADMIN — MORPHINE SULFATE 2 MILLIGRAM(S): 50 CAPSULE, EXTENDED RELEASE ORAL at 22:00

## 2022-06-03 RX ADMIN — Medication 1 PATCH: at 18:47

## 2022-06-03 RX ADMIN — Medication 1 PATCH: at 07:00

## 2022-06-03 RX ADMIN — Medication 65 MILLIGRAM(S): at 05:45

## 2022-06-03 NOTE — PHYSICAL THERAPY INITIAL EVALUATION ADULT - ADDITIONAL COMMENTS
As per patient prior to this admission he is independent in all ADLs and does not need any walking device (*needs to be confirmed from family members for accuracy)

## 2022-06-03 NOTE — PROGRESS NOTE ADULT - SUBJECTIVE AND OBJECTIVE BOX
Pt resting comfortably, no acute events.     Physical Exam:   · Neurological	detailed exam  · Mental Status	Awake, lethargic, more appropriate language intact. confused. AOX2.   · Cranial Nerve	PERRLA Face symmetric, does not track.  · Motor	Moving all extremities spontaneously  Toes downgoing  · Sensory	withdraws equally in all extr       · Assessment	  Seizures without clear history and history of etoh abuse  HTN  Diffuse Cerebral and cerebellar atrophy   right extracranial hematoma       Plan  EEG normal, no seizure focus  no AEDs at this time  MRI Brain w/ and wo dc'ed by primary team   SU  MVI  rest as per primary team  will continue follow

## 2022-06-03 NOTE — PHYSICAL THERAPY INITIAL EVALUATION ADULT - COORDINATION ASSESSED, REHAB EVAL
impaired on the UE due to pain in both, LE impaired due to strength deficits/finger to nose/heel to shin

## 2022-06-03 NOTE — PHYSICAL THERAPY INITIAL EVALUATION ADULT - DIAGNOSIS, PT EVAL
Pt presents strength and endurance deficits, poor sitting,standing and ambulation balance, fall risk.

## 2022-06-03 NOTE — PROGRESS NOTE ADULT - SUBJECTIVE AND OBJECTIVE BOX
INTERVAL HPI/OVERNIGHT EVENTS:      CENTRAL LINE: [ ] YES [ ] NO  LOCATION:       BRIGHT: [ ] YES [ ] NO        A-LINE:  [ ] YES [ ] NO  LOCATION:       GLOBAL ISSUE/BEST PRACTICE:  Analgesia:   Sedation:dexMEDEtomidine Infusion    HOB elevation: yes  Stress ulcer prophylaxis:   VTE prophylaxis: HSQ  Oral Care: Chlorhexidine  Glycemic control:   Nutrition:Diet, DASH/TLC:   Sodium & Cholesterol Restricted  Supplement Feeding Modality:  Oral  Ensure Enlive Cans or Servings Per Day:  1       Frequency:  Two Times a day (06-02-22 @ 15:07) [Active]          REVIEW OF SYSTEMS: [] Unable to obtain because:    CONSTITUTIONAL: No fever, weight loss, or fatigue  EYES: No eye pain, visual disturbances, or discharge  ENMT:  No difficulty hearing, tinnitus, vertigo; No sinus or throat pain  NECK: No pain or stiffness  RESPIRATORY: No cough, wheezing, chills or hemoptysis; No shortness of breath  CARDIOVASCULAR: No chest pain, palpitations, dizziness, or leg swelling  GASTROINTESTINAL: No abdominal or epigastric pain. No nausea, vomiting, or hematemesis; No diarrhea or constipation. No melena or hematochezia.  GENITOURINARY: No dysuria, frequency, hematuria, or incontinence  NEUROLOGICAL: No headaches, memory loss, loss of strength, numbness, or tremors  SKIN: No itching, burning, rashes, or lesions     PHYSICAL EXAM:    GENERAL: NAD, well-groomed, well-developed  HEAD:  Atraumatic, Normocephalic  EYES: EOMI, PERRLA, conjunctiva and sclera clear  ENMT: No tonsillar erythema, exudates, or enlargement; Moist mucous membranes, No lesions  NECK: Supple, No JVD, Normal thyroid  CHEST/LUNG: Clear to auscultation bilaterally; No rales, rhonchi, wheezing, or rubs  HEART: Regular rate and rhythm; No murmurs, rubs, or gallops  ABDOMEN: Soft, Nontender, Nondistended; Bowel sounds present  EXTREMITIES:  2+ Peripheral Pulses, No clubbing, cyanosis, or edema  LYMPH: No lymphadenopathy noted  SKIN: No rashes or lesions  NERVOUS SYSTEM:  Alert & Oriented X3, Good concentration; Motor Strength 5/5 B/L upper and lower extremities; DTRs 2+ intact and symmetric    ICU Vital Signs Last 24 Hrs  T(C): 36.3 (03 Jun 2022 04:00), Max: 37.2 (02 Jun 2022 15:00)  T(F): 97.4 (03 Jun 2022 04:00), Max: 99 (02 Jun 2022 15:00)  HR: 100 (03 Jun 2022 07:25) (80 - 113)  BP: 142/95 (03 Jun 2022 07:00) (114/81 - 207/121)  BP(mean): 110 (03 Jun 2022 07:00) (75 - 144)  ABP: --  ABP(mean): --  RR: 14 (03 Jun 2022 07:25) (12 - 36)  SpO2: 100% (03 Jun 2022 07:25) (98% - 100%)    I&O's Detail    02 Jun 2022 07:01  -  03 Jun 2022 07:00  --------------------------------------------------------  IN:    Dexmedetomidine: 274.8 mL    IV PiggyBack: 100 mL    Lactated Ringers: 2200 mL    Oral Fluid: 500 mL  Total IN: 3074.8 mL    OUT:    Voided (mL): 750 mL  Total OUT: 750 mL    Total NET: 2324.8 mL        MEDICATIONS  NEURO  Meds: acetaminophen     Tablet .. 650 milliGRAM(s) Oral every 6 hours PRN Temp greater or equal to 38C (100.4F), Mild Pain (1 - 3)  dexMEDEtomidine Infusion 0.2 MICROgram(s)/kG/Hr (2.94 mL/Hr) IV Continuous <Continuous>  ondansetron Injectable 4 milliGRAM(s) IV Push every 8 hours PRN Nausea and/or Vomiting  PHENobarbital Injectable 65 milliGRAM(s) IV Push every 6 hours    RESPIRATORY    Meds:   CARDIOVASCULAR  Meds: cloNIDine Patch 0.1 mG/24Hr(s) 1 patch Transdermal every 7 days  hydrALAZINE Injectable 10 milliGRAM(s) IV Push every 6 hours PRN sbp > 160    GI/NUTRITION  Meds:   GENITOURINARY  Meds: folic acid Injectable 1 milliGRAM(s) IV Push daily  lactated ringers. 1000 milliLiter(s) IV Continuous <Continuous>  thiamine Injectable 100 milliGRAM(s) IV Push daily    HEMATOLOGIC  Meds: enoxaparin Injectable 40 milliGRAM(s) SubCutaneous every 24 hours    [x] VTE Prophylaxis  INFECTIOUS DISEASES  Meds:   ENDOCRINE  CAPILLARY BLOOD GLUCOSE        Meds:   OTHER MEDICATIONS:  chlorhexidine 2% Cloths 1 Application(s) Topical daily  :    LABS:                        11.4   5.69  )-----------( 126      ( 03 Jun 2022 02:55 )             33.0      06-03    135  |  104  |  9   ----------------------------<  82  3.9   |  21<L>  |  0.72    Ca    8.6      03 Jun 2022 02:55  Phos  3.4     06-02  Mg     2.0     06-03    TPro  6.0  /  Alb  1.9<L>  /  TBili  0.3  /  DBili  x   /  AST  19  /  ALT  16  /  AlkPhos  71  06-03                  RADIOLOGY & ADDITIONAL STUDIES:     INTERVAL HPI/OVERNIGHT EVENTS:    No acute issues overnight.  Remains on precedex, decreased from yesterday to 0.5mcg/kg/min.      CENTRAL LINE: [ ] YES [x ] NO  LOCATION:       BRIGHT: [ ] YES [x ] NO        A-LINE:  [ ] YES [x ] NO  LOCATION:       GLOBAL ISSUE/BEST PRACTICE:  Analgesia:   Sedation:dexMEDEtomidine Infusion  HOB elevation: yes  Stress ulcer prophylaxis:   VTE prophylaxis: lovenox  Oral Care: Chlorhexidine  Glycemic control:   Nutrition:Diet, DASH/TLC: Sodium & Cholesterol Restricted Supplement Feeding Modality:  Oral Ensure Enlive Cans or Servings Per Day:  1       Frequency:  Two Times a day     REVIEW OF SYSTEMS: [x] Unable to obtain because: sedated on precedex    PHYSICAL EXAM:    GENERAL: NAD, well-groomed, well-developed  HEAD:  Atraumatic, Normocephalic  EYES: EOMI, PERRLA, conjunctiva and sclera clear  ENMT: No tonsillar erythema, exudates, or enlargement; Moist mucous membranes, No lesions  NECK: Supple, No JVD, Normal thyroid  CHEST/LUNG: Clear to auscultation bilaterally; No rales, rhonchi, wheezing, or rubs  HEART: Regular rate and rhythm; No murmurs, rubs, or gallops  ABDOMEN: Soft, Nontender, Nondistended; Bowel sounds present  EXTREMITIES:  2+ Peripheral Pulses, No clubbing, cyanosis, or edema  NERVOUS SYSTEM:  Awake and alert, confused at times, remains on precedex Motor Strength 5/5 B/L upper and lower extremities;    ICU Vital Signs Last 24 Hrs  T(C): 36.3 (03 Jun 2022 04:00), Max: 37.2 (02 Jun 2022 15:00)  T(F): 97.4 (03 Jun 2022 04:00), Max: 99 (02 Jun 2022 15:00)  HR: 100 (03 Jun 2022 07:25) (80 - 113)  BP: 142/95 (03 Jun 2022 07:00) (114/81 - 207/121)  BP(mean): 110 (03 Jun 2022 07:00) (75 - 144)  ABP: --  ABP(mean): --  RR: 14 (03 Jun 2022 07:25) (12 - 36)  SpO2: 100% (03 Jun 2022 07:25) (98% - 100%)    I&O's Detail    02 Jun 2022 07:01  -  03 Jun 2022 07:00  --------------------------------------------------------  IN:    Dexmedetomidine: 274.8 mL    IV PiggyBack: 100 mL    Lactated Ringers: 2200 mL    Oral Fluid: 500 mL  Total IN: 3074.8 mL    OUT:    Voided (mL): 750 mL  Total OUT: 750 mL    Total NET: 2324.8 mL        MEDICATIONS  NEURO  Meds: acetaminophen     Tablet .. 650 milliGRAM(s) Oral every 6 hours PRN Temp greater or equal to 38C (100.4F), Mild Pain (1 - 3)  dexMEDEtomidine Infusion 0.2 MICROgram(s)/kG/Hr (2.94 mL/Hr) IV Continuous <Continuous>  ondansetron Injectable 4 milliGRAM(s) IV Push every 8 hours PRN Nausea and/or Vomiting  PHENobarbital Injectable 65 milliGRAM(s) IV Push every 6 hours    RESPIRATORY    Meds:   CARDIOVASCULAR  Meds: cloNIDine Patch 0.1 mG/24Hr(s) 1 patch Transdermal every 7 days  hydrALAZINE Injectable 10 milliGRAM(s) IV Push every 6 hours PRN sbp > 160    GI/NUTRITION  Meds:   GENITOURINARY  Meds: folic acid Injectable 1 milliGRAM(s) IV Push daily  lactated ringers. 1000 milliLiter(s) IV Continuous <Continuous>  thiamine Injectable 100 milliGRAM(s) IV Push daily    HEMATOLOGIC  Meds: enoxaparin Injectable 40 milliGRAM(s) SubCutaneous every 24 hours    [x] VTE Prophylaxis  INFECTIOUS DISEASES  Meds:   ENDOCRINE  CAPILLARY BLOOD GLUCOSE        Meds:   OTHER MEDICATIONS:  chlorhexidine 2% Cloths 1 Application(s) Topical daily  :    LABS:                        11.4   5.69  )-----------( 126      ( 03 Jun 2022 02:55 )             33.0      06-03    135  |  104  |  9   ----------------------------<  82  3.9   |  21<L>  |  0.72    Ca    8.6      03 Jun 2022 02:55  Phos  3.4     06-02  Mg     2.0     06-03    TPro  6.0  /  Alb  1.9<L>  /  TBili  0.3  /  DBili  x   /  AST  19  /  ALT  16  /  AlkPhos  71  06-03                  RADIOLOGY & ADDITIONAL STUDIES:     INTERVAL HPI/OVERNIGHT EVENTS:    No acute issues overnight.  Remains on precedex, decreased from yesterday to 0.5mcg/kg/min.  Reports pain in R hand, specifically with ROM of thumb.      CENTRAL LINE: [ ] YES [x ] NO  LOCATION:       BRIGHT: [ ] YES [x ] NO        A-LINE:  [ ] YES [x ] NO  LOCATION:       GLOBAL ISSUE/BEST PRACTICE:  Analgesia:   Sedation:dexMEDEtomidine Infusion  HOB elevation: yes  Stress ulcer prophylaxis:   VTE prophylaxis: lovenox  Oral Care: Chlorhexidine  Glycemic control:   Nutrition:Diet, DASH/TLC: Sodium & Cholesterol Restricted Supplement Feeding Modality:  Oral Ensure Enlive Cans or Servings Per Day:  1       Frequency:  Two Times a day     REVIEW OF SYSTEMS: [x] Unable to obtain because: sedated on precedex    PHYSICAL EXAM:    GENERAL: NAD, well-groomed, well-developed  HEAD:  Atraumatic, Normocephalic  EYES: EOMI, PERRLA, conjunctiva and sclera clear  ENMT: No tonsillar erythema, exudates, or enlargement; Moist mucous membranes, No lesions  NECK: Supple, No JVD, Normal thyroid  CHEST/LUNG: Clear to auscultation bilaterally; No rales, rhonchi, wheezing, or rubs  HEART: Regular rate and rhythm; No murmurs, rubs, or gallops  ABDOMEN: Soft, Nontender, Nondistended; Bowel sounds present  EXTREMITIES:  2+ Peripheral Pulses, 1+ edema of R hand, painful with movement of thumb, and with actively making a fist.  NERVOUS SYSTEM:  Awake and alert, confused at times, remains on precedex Motor Strength 5/5 B/L upper and lower extremities;    ICU Vital Signs Last 24 Hrs  T(C): 36.3 (03 Jun 2022 04:00), Max: 37.2 (02 Jun 2022 15:00)  T(F): 97.4 (03 Jun 2022 04:00), Max: 99 (02 Jun 2022 15:00)  HR: 100 (03 Jun 2022 07:25) (80 - 113)  BP: 142/95 (03 Jun 2022 07:00) (114/81 - 207/121)  BP(mean): 110 (03 Jun 2022 07:00) (75 - 144)  ABP: --  ABP(mean): --  RR: 14 (03 Jun 2022 07:25) (12 - 36)  SpO2: 100% (03 Jun 2022 07:25) (98% - 100%)    I&O's Detail    02 Jun 2022 07:01  -  03 Jun 2022 07:00  --------------------------------------------------------  IN:    Dexmedetomidine: 274.8 mL    IV PiggyBack: 100 mL    Lactated Ringers: 2200 mL    Oral Fluid: 500 mL  Total IN: 3074.8 mL    OUT:    Voided (mL): 750 mL  Total OUT: 750 mL    Total NET: 2324.8 mL        MEDICATIONS  NEURO  Meds: acetaminophen     Tablet .. 650 milliGRAM(s) Oral every 6 hours PRN Temp greater or equal to 38C (100.4F), Mild Pain (1 - 3)  dexMEDEtomidine Infusion 0.2 MICROgram(s)/kG/Hr (2.94 mL/Hr) IV Continuous <Continuous>  ondansetron Injectable 4 milliGRAM(s) IV Push every 8 hours PRN Nausea and/or Vomiting  PHENobarbital Injectable 65 milliGRAM(s) IV Push every 6 hours    RESPIRATORY    Meds:   CARDIOVASCULAR  Meds: cloNIDine Patch 0.1 mG/24Hr(s) 1 patch Transdermal every 7 days  hydrALAZINE Injectable 10 milliGRAM(s) IV Push every 6 hours PRN sbp > 160    GI/NUTRITION  Meds:   GENITOURINARY  Meds: folic acid Injectable 1 milliGRAM(s) IV Push daily  lactated ringers. 1000 milliLiter(s) IV Continuous <Continuous>  thiamine Injectable 100 milliGRAM(s) IV Push daily    HEMATOLOGIC  Meds: enoxaparin Injectable 40 milliGRAM(s) SubCutaneous every 24 hours    [x] VTE Prophylaxis  INFECTIOUS DISEASES  Meds:   ENDOCRINE  CAPILLARY BLOOD GLUCOSE        Meds:   OTHER MEDICATIONS:  chlorhexidine 2% Cloths 1 Application(s) Topical daily  :    LABS:                        11.4   5.69  )-----------( 126      ( 03 Jun 2022 02:55 )             33.0      06-03    135  |  104  |  9   ----------------------------<  82  3.9   |  21<L>  |  0.72    Ca    8.6      03 Jun 2022 02:55  Phos  3.4     06-02  Mg     2.0     06-03    TPro  6.0  /  Alb  1.9<L>  /  TBili  0.3  /  DBili  x   /  AST  19  /  ALT  16  /  AlkPhos  71  06-03                  RADIOLOGY & ADDITIONAL STUDIES:

## 2022-06-03 NOTE — PHYSICAL THERAPY INITIAL EVALUATION ADULT - PRECAUTIONS/LIMITATIONS, REHAB EVAL
pt is confused, disoriented, incoherent, impulsive, not following directions, unsteady gait , fall risk.

## 2022-06-03 NOTE — PROGRESS NOTE ADULT - ASSESSMENT
41 yo M with HTN, ETOH abuse, seizures a/w seizures in the setting of ETOH withdrawal, transferred to ICU for worsening withdrawal symptoms and elevated CIWA.     Neuro - Continue with phenobarb and precedex.  Titrate to RASS 0 to -1.  Now off Keppra  CV - HTN - continue with clonidine patch and hydralzine PRN.   Pulm - Monitor Airway  GI - reg diet - continue with MVI, thiamine and folate  Heme - Lovenox for PPx  Renal - GREYSON, trend electrolytes and Cr; strict I and Os.   MSK - Xray and RUE US to r/o fracture of hand specifically first digit given swelling and pain with ROM and DVT of RUE.    GOC - Full code  Dispo - remains on precedex, wean as tolerated, remains critically ill in ETOH withdrawal.

## 2022-06-03 NOTE — PHYSICAL THERAPY INITIAL EVALUATION ADULT - LIVES WITH, PROFILE
Unable to get reliable info from patient due to confusion, *PT tried to get in touch with family member, called [phone] but no response obtained

## 2022-06-03 NOTE — PHYSICAL THERAPY INITIAL EVALUATION ADULT - IMPAIRMENTS FOUND, PT EVAL
aerobic capacity/endurance/arousal, attention, and cognition/cognitive impairment/ergonomics and body mechanics/gait, locomotion, and balance/muscle strength

## 2022-06-03 NOTE — PHYSICAL THERAPY INITIAL EVALUATION ADULT - PERTINENT HX OF CURRENT PROBLEM, REHAB EVAL
As per medical records the patient is admitted with dx Seizure, hand shaking, dizziness, R facial hematoma and abrasion, h/o ETOH abuse,

## 2022-06-03 NOTE — PHYSICAL THERAPY INITIAL EVALUATION ADULT - PERSONAL SAFETY AND JUDGMENT, REHAB EVAL
due to impulsiveness, confused, not following safety precautions, fall risk/impaired/at risk behaviors demonstrated

## 2022-06-03 NOTE — PHYSICAL THERAPY INITIAL EVALUATION ADULT - RANGE OF MOTION EXAMINATION, REHAB EVAL
with  c/o pain in both arms upon movt, palpation and activities/bilateral upper extremity ROM was WFL (within functional limits)/bilateral lower extremity ROM was WFL (within functional limits)

## 2022-06-03 NOTE — PHYSICAL THERAPY INITIAL EVALUATION ADULT - GAIT DEVIATIONS NOTED, PT EVAL
deviates from midline/decreased birdie/increased time in double stance/decreased velocity of limb motion/decreased step length/decreased stride length

## 2022-06-04 LAB
ALBUMIN SERPL ELPH-MCNC: 1.9 G/DL — LOW (ref 3.3–5)
ALP SERPL-CCNC: 124 U/L — HIGH (ref 40–120)
ALT FLD-CCNC: 36 U/L — SIGNIFICANT CHANGE UP (ref 12–78)
ANION GAP SERPL CALC-SCNC: 7 MMOL/L — SIGNIFICANT CHANGE UP (ref 5–17)
AST SERPL-CCNC: 84 U/L — HIGH (ref 15–37)
BASOPHILS # BLD AUTO: 0.03 K/UL — SIGNIFICANT CHANGE UP (ref 0–0.2)
BASOPHILS NFR BLD AUTO: 0.4 % — SIGNIFICANT CHANGE UP (ref 0–2)
BILIRUB SERPL-MCNC: 0.3 MG/DL — SIGNIFICANT CHANGE UP (ref 0.2–1.2)
BUN SERPL-MCNC: 7 MG/DL — SIGNIFICANT CHANGE UP (ref 7–23)
CALCIUM SERPL-MCNC: 8.7 MG/DL — SIGNIFICANT CHANGE UP (ref 8.5–10.1)
CHLORIDE SERPL-SCNC: 103 MMOL/L — SIGNIFICANT CHANGE UP (ref 96–108)
CO2 SERPL-SCNC: 24 MMOL/L — SIGNIFICANT CHANGE UP (ref 22–31)
CREAT SERPL-MCNC: 0.8 MG/DL — SIGNIFICANT CHANGE UP (ref 0.5–1.3)
EGFR: 115 ML/MIN/1.73M2 — SIGNIFICANT CHANGE UP
EOSINOPHIL # BLD AUTO: 0.03 K/UL — SIGNIFICANT CHANGE UP (ref 0–0.5)
EOSINOPHIL NFR BLD AUTO: 0.4 % — SIGNIFICANT CHANGE UP (ref 0–6)
FLUAV AG NPH QL: SIGNIFICANT CHANGE UP
FLUBV AG NPH QL: SIGNIFICANT CHANGE UP
GLUCOSE SERPL-MCNC: 90 MG/DL — SIGNIFICANT CHANGE UP (ref 70–99)
HCT VFR BLD CALC: 29.1 % — LOW (ref 39–50)
HGB BLD-MCNC: 10.2 G/DL — LOW (ref 13–17)
IMM GRANULOCYTES NFR BLD AUTO: 0.9 % — SIGNIFICANT CHANGE UP (ref 0–1.5)
LYMPHOCYTES # BLD AUTO: 0.65 K/UL — LOW (ref 1–3.3)
LYMPHOCYTES # BLD AUTO: 9.5 % — LOW (ref 13–44)
MAGNESIUM SERPL-MCNC: 1.6 MG/DL — SIGNIFICANT CHANGE UP (ref 1.6–2.6)
MCHC RBC-ENTMCNC: 32.5 PG — SIGNIFICANT CHANGE UP (ref 27–34)
MCHC RBC-ENTMCNC: 35.1 G/DL — SIGNIFICANT CHANGE UP (ref 32–36)
MCV RBC AUTO: 92.7 FL — SIGNIFICANT CHANGE UP (ref 80–100)
MONOCYTES # BLD AUTO: 1.48 K/UL — HIGH (ref 0–0.9)
MONOCYTES NFR BLD AUTO: 21.7 % — HIGH (ref 2–14)
NEUTROPHILS # BLD AUTO: 4.56 K/UL — SIGNIFICANT CHANGE UP (ref 1.8–7.4)
NEUTROPHILS NFR BLD AUTO: 67.1 % — SIGNIFICANT CHANGE UP (ref 43–77)
NRBC # BLD: 0 /100 WBCS — SIGNIFICANT CHANGE UP (ref 0–0)
PHENOBARB SERPL-MCNC: 60.5 UG/ML — CRITICAL HIGH (ref 15–40)
PHOSPHATE SERPL-MCNC: 2.6 MG/DL — SIGNIFICANT CHANGE UP (ref 2.5–4.5)
PLATELET # BLD AUTO: 160 K/UL — SIGNIFICANT CHANGE UP (ref 150–400)
POTASSIUM SERPL-MCNC: 3.8 MMOL/L — SIGNIFICANT CHANGE UP (ref 3.5–5.3)
POTASSIUM SERPL-SCNC: 3.8 MMOL/L — SIGNIFICANT CHANGE UP (ref 3.5–5.3)
PROT SERPL-MCNC: 6 GM/DL — SIGNIFICANT CHANGE UP (ref 6–8.3)
RBC # BLD: 3.14 M/UL — LOW (ref 4.2–5.8)
RBC # FLD: 14.8 % — HIGH (ref 10.3–14.5)
SARS-COV-2 RNA SPEC QL NAA+PROBE: SIGNIFICANT CHANGE UP
SODIUM SERPL-SCNC: 134 MMOL/L — LOW (ref 135–145)
WBC # BLD: 6.81 K/UL — SIGNIFICANT CHANGE UP (ref 3.8–10.5)
WBC # FLD AUTO: 6.81 K/UL — SIGNIFICANT CHANGE UP (ref 3.8–10.5)

## 2022-06-04 PROCEDURE — 93010 ELECTROCARDIOGRAM REPORT: CPT

## 2022-06-04 PROCEDURE — 99291 CRITICAL CARE FIRST HOUR: CPT

## 2022-06-04 RX ORDER — PHENOBARBITAL 60 MG
65 TABLET ORAL ONCE
Refills: 0 | Status: DISCONTINUED | OUTPATIENT
Start: 2022-06-04 | End: 2022-06-04

## 2022-06-04 RX ORDER — HALOPERIDOL DECANOATE 100 MG/ML
5 INJECTION INTRAMUSCULAR AT BEDTIME
Refills: 0 | Status: DISCONTINUED | OUTPATIENT
Start: 2022-06-04 | End: 2022-06-05

## 2022-06-04 RX ORDER — HALOPERIDOL DECANOATE 100 MG/ML
5 INJECTION INTRAMUSCULAR DAILY
Refills: 0 | Status: DISCONTINUED | OUTPATIENT
Start: 2022-06-05 | End: 2022-06-05

## 2022-06-04 RX ORDER — MAGNESIUM SULFATE 500 MG/ML
2 VIAL (ML) INJECTION ONCE
Refills: 0 | Status: COMPLETED | OUTPATIENT
Start: 2022-06-04 | End: 2022-06-04

## 2022-06-04 RX ORDER — HALOPERIDOL DECANOATE 100 MG/ML
5 INJECTION INTRAMUSCULAR ONCE
Refills: 0 | Status: COMPLETED | OUTPATIENT
Start: 2022-06-04 | End: 2022-06-04

## 2022-06-04 RX ADMIN — SODIUM CHLORIDE 100 MILLILITER(S): 9 INJECTION, SOLUTION INTRAVENOUS at 12:26

## 2022-06-04 RX ADMIN — Medication 1 PATCH: at 19:51

## 2022-06-04 RX ADMIN — HALOPERIDOL DECANOATE 5 MILLIGRAM(S): 100 INJECTION INTRAMUSCULAR at 21:10

## 2022-06-04 RX ADMIN — Medication 100 MILLIGRAM(S): at 17:51

## 2022-06-04 RX ADMIN — DEXMEDETOMIDINE HYDROCHLORIDE IN 0.9% SODIUM CHLORIDE 2.94 MICROGRAM(S)/KG/HR: 4 INJECTION INTRAVENOUS at 04:09

## 2022-06-04 RX ADMIN — ENOXAPARIN SODIUM 40 MILLIGRAM(S): 100 INJECTION SUBCUTANEOUS at 12:22

## 2022-06-04 RX ADMIN — Medication 65 MILLIGRAM(S): at 12:22

## 2022-06-04 RX ADMIN — DEXMEDETOMIDINE HYDROCHLORIDE IN 0.9% SODIUM CHLORIDE 2.94 MICROGRAM(S)/KG/HR: 4 INJECTION INTRAVENOUS at 12:28

## 2022-06-04 RX ADMIN — DEXMEDETOMIDINE HYDROCHLORIDE IN 0.9% SODIUM CHLORIDE 2.94 MICROGRAM(S)/KG/HR: 4 INJECTION INTRAVENOUS at 08:10

## 2022-06-04 RX ADMIN — Medication 1 PATCH: at 07:47

## 2022-06-04 RX ADMIN — Medication 25 GRAM(S): at 04:09

## 2022-06-04 RX ADMIN — HALOPERIDOL DECANOATE 5 MILLIGRAM(S): 100 INJECTION INTRAMUSCULAR at 02:34

## 2022-06-04 RX ADMIN — Medication 65 MILLIGRAM(S): at 05:51

## 2022-06-04 RX ADMIN — DEXMEDETOMIDINE HYDROCHLORIDE IN 0.9% SODIUM CHLORIDE 2.94 MICROGRAM(S)/KG/HR: 4 INJECTION INTRAVENOUS at 17:44

## 2022-06-04 RX ADMIN — Medication 65 MILLIGRAM(S): at 02:15

## 2022-06-04 RX ADMIN — Medication 1 MILLIGRAM(S): at 17:11

## 2022-06-04 RX ADMIN — Medication 65 MILLIGRAM(S): at 17:11

## 2022-06-04 NOTE — PROGRESS NOTE ADULT - ASSESSMENT
43 yo M with HTN, ETOH abuse, seizures a/w seizures in the setting of ETOH withdrawal, transferred to ICU for worsening withdrawal symptoms and elevated CIWA.     Neuro - Continue with phenobarb and precedex.  Titrate to RASS 0 to -1.  Now off Keppra; add haldol for agitation BID.  Monitor QTc while on haldol.    CV - HTN - continue with clonidine patch and hydralzine PRN.   Pulm - Monitor Airway  GI - reg diet - continue with MVI, thiamine and folate  Heme - Lovenox for PPx  Renal - GREYSON, trend electrolytes and Cr; strict I and Os.   MSK - Xray and RUE US to neg fracture of hand specifically first digit given swelling and pain with ROM and neg DVT of RUE.    GOC - Full code  Dispo - remains on precedex, wean as tolerated, remains critically ill in ETOH withdrawal.

## 2022-06-04 NOTE — PROGRESS NOTE ADULT - SUBJECTIVE AND OBJECTIVE BOX
INTERVAL HPI/OVERNIGHT EVENTS:    Agitated overnight while weaning off of Precedex.  Now sleeping on Precedex 0.5mcg/kg/min.      CENTRAL LINE: [ ] YES [x ] NO  LOCATION:       BRIGHT: [ ] YES [x ] NO        A-LINE:  [ ] YES [x ] NO  LOCATION:       GLOBAL ISSUE/BEST PRACTICE:  Analgesia:   Sedation:dexMEDEtomidine Infusion  HOB elevation: yes  Stress ulcer prophylaxis:   VTE prophylaxis: lovenox  Oral Care: Chlorhexidine  Glycemic control:   Nutrition:Diet, DASH/TLC: Sodium & Cholesterol Restricted Supplement Feeding Modality:  Oral Ensure Enlive Cans or Servings Per Day:  1       Frequency:  Two Times a day     REVIEW OF SYSTEMS: [x] Unable to obtain because: sedated on precedex    PHYSICAL EXAM:    GENERAL: NAD, well-groomed, well-developed  HEAD:  Atraumatic, Normocephalic  EYES: EOMI, PERRLA, conjunctiva and sclera clear  ENMT: No tonsillar erythema, exudates, or enlargement; Moist mucous membranes, No lesions  NECK: Supple, No JVD, Normal thyroid  CHEST/LUNG: Clear to auscultation bilaterally; No rales, rhonchi, wheezing, or rubs  HEART: Regular rate and rhythm; No murmurs, rubs, or gallops  ABDOMEN: Soft, Nontender, Nondistended; Bowel sounds present  EXTREMITIES:  2+ Peripheral Pulses, 1+ edema of R hand, painful with movement of thumb, and with actively making a fist.  NERVOUS SYSTEM:  Awake and alert, confused at times, remains on precedex Motor Strength 5/5 B/L upper and lower extremities;    ICU Vital Signs Last 24 Hrs  T(C): 36.8 (04 Jun 2022 05:00), Max: 38 (03 Jun 2022 23:00)  T(F): 98.3 (04 Jun 2022 05:00), Max: 100.4 (03 Jun 2022 23:00)  HR: 97 (04 Jun 2022 08:00) (95 - 136)  BP: 129/90 (04 Jun 2022 08:00) (129/90 - 189/104)  BP(mean): 100 (04 Jun 2022 08:00) (95 - 126)  ABP: --  ABP(mean): --  RR: 13 (04 Jun 2022 08:00) (13 - 32)  SpO2: 97% (04 Jun 2022 08:00) (95% - 100%)    I&O's Detail    03 Jun 2022 07:01  -  04 Jun 2022 07:00  --------------------------------------------------------  IN:    Dexmedetomidine: 171.9 mL    IV PiggyBack: 150 mL    Lactated Ringers: 2400 mL    Oral Fluid: 859 mL  Total IN: 3580.9 mL    OUT:    Voided (mL): 850 mL  Total OUT: 850 mL    Total NET: 2730.9 mL        MEDICATIONS  NEURO  Meds: dexMEDEtomidine Infusion 0.2 MICROgram(s)/kG/Hr (2.94 mL/Hr) IV Continuous <Continuous>  ondansetron Injectable 4 milliGRAM(s) IV Push every 8 hours PRN Nausea and/or Vomiting  PHENobarbital Injectable 65 milliGRAM(s) IV Push every 6 hours    RESPIRATORY    Meds:   CARDIOVASCULAR  Meds: cloNIDine Patch 0.1 mG/24Hr(s) 1 patch Transdermal every 7 days  hydrALAZINE Injectable 10 milliGRAM(s) IV Push every 6 hours PRN sbp > 160    GI/NUTRITION  Meds:   GENITOURINARY  Meds: folic acid Injectable 1 milliGRAM(s) IV Push daily  lactated ringers. 1000 milliLiter(s) IV Continuous <Continuous>  thiamine Injectable 100 milliGRAM(s) IV Push daily    HEMATOLOGIC  Meds: enoxaparin Injectable 40 milliGRAM(s) SubCutaneous every 24 hours    [x] VTE Prophylaxis  INFECTIOUS DISEASES  Meds:   ENDOCRINE  CAPILLARY BLOOD GLUCOSE        Meds:   OTHER MEDICATIONS:  chlorhexidine 2% Cloths 1 Application(s) Topical daily  :    LABS:                        10.2   6.81  )-----------( 160      ( 04 Jun 2022 02:49 )             29.1      06-04    134<L>  |  103  |  7   ----------------------------<  90  3.8   |  24  |  0.80    Ca    8.7      04 Jun 2022 02:49  Phos  2.6     06-04  Mg     1.6     06-04    TPro  6.0  /  Alb  1.9<L>  /  TBili  0.3  /  DBili  x   /  AST  84<H>  /  ALT  36  /  AlkPhos  124<H>  06-04                  RADIOLOGY & ADDITIONAL STUDIES:

## 2022-06-05 LAB
ALBUMIN SERPL ELPH-MCNC: 1.8 G/DL — LOW (ref 3.3–5)
ALP SERPL-CCNC: 110 U/L — SIGNIFICANT CHANGE UP (ref 40–120)
ALT FLD-CCNC: 23 U/L — SIGNIFICANT CHANGE UP (ref 12–78)
ANION GAP SERPL CALC-SCNC: 9 MMOL/L — SIGNIFICANT CHANGE UP (ref 5–17)
APPEARANCE UR: CLEAR — SIGNIFICANT CHANGE UP
AST SERPL-CCNC: 28 U/L — SIGNIFICANT CHANGE UP (ref 15–37)
BILIRUB SERPL-MCNC: 0.2 MG/DL — SIGNIFICANT CHANGE UP (ref 0.2–1.2)
BILIRUB UR-MCNC: NEGATIVE — SIGNIFICANT CHANGE UP
BUN SERPL-MCNC: 5 MG/DL — LOW (ref 7–23)
CALCIUM SERPL-MCNC: 8.8 MG/DL — SIGNIFICANT CHANGE UP (ref 8.5–10.1)
CHLORIDE SERPL-SCNC: 101 MMOL/L — SIGNIFICANT CHANGE UP (ref 96–108)
CO2 SERPL-SCNC: 22 MMOL/L — SIGNIFICANT CHANGE UP (ref 22–31)
COLOR SPEC: YELLOW — SIGNIFICANT CHANGE UP
CREAT SERPL-MCNC: 0.74 MG/DL — SIGNIFICANT CHANGE UP (ref 0.5–1.3)
DIFF PNL FLD: NEGATIVE — SIGNIFICANT CHANGE UP
EGFR: 117 ML/MIN/1.73M2 — SIGNIFICANT CHANGE UP
GLUCOSE SERPL-MCNC: 91 MG/DL — SIGNIFICANT CHANGE UP (ref 70–99)
GLUCOSE UR QL: NEGATIVE MG/DL — SIGNIFICANT CHANGE UP
HCT VFR BLD CALC: 29.3 % — LOW (ref 39–50)
HGB BLD-MCNC: 10.1 G/DL — LOW (ref 13–17)
KETONES UR-MCNC: NEGATIVE — SIGNIFICANT CHANGE UP
LEUKOCYTE ESTERASE UR-ACNC: NEGATIVE — SIGNIFICANT CHANGE UP
MAGNESIUM SERPL-MCNC: 1.7 MG/DL — SIGNIFICANT CHANGE UP (ref 1.6–2.6)
MCHC RBC-ENTMCNC: 32.1 PG — SIGNIFICANT CHANGE UP (ref 27–34)
MCHC RBC-ENTMCNC: 34.5 G/DL — SIGNIFICANT CHANGE UP (ref 32–36)
MCV RBC AUTO: 93 FL — SIGNIFICANT CHANGE UP (ref 80–100)
NITRITE UR-MCNC: NEGATIVE — SIGNIFICANT CHANGE UP
NRBC # BLD: 0 /100 WBCS — SIGNIFICANT CHANGE UP (ref 0–0)
PH UR: 8 — SIGNIFICANT CHANGE UP (ref 5–8)
PHENOBARB SERPL-MCNC: 51.7 UG/ML — CRITICAL HIGH (ref 15–40)
PHOSPHATE SERPL-MCNC: 3 MG/DL — SIGNIFICANT CHANGE UP (ref 2.5–4.5)
PLATELET # BLD AUTO: 92 K/UL — LOW (ref 150–400)
POTASSIUM SERPL-MCNC: 4.3 MMOL/L — SIGNIFICANT CHANGE UP (ref 3.5–5.3)
POTASSIUM SERPL-SCNC: 4.3 MMOL/L — SIGNIFICANT CHANGE UP (ref 3.5–5.3)
PROT SERPL-MCNC: 5.9 GM/DL — LOW (ref 6–8.3)
PROT UR-MCNC: NEGATIVE MG/DL — SIGNIFICANT CHANGE UP
RBC # BLD: 3.15 M/UL — LOW (ref 4.2–5.8)
RBC # FLD: 15.1 % — HIGH (ref 10.3–14.5)
SODIUM SERPL-SCNC: 132 MMOL/L — LOW (ref 135–145)
SP GR SPEC: 1.01 — SIGNIFICANT CHANGE UP (ref 1.01–1.02)
UROBILINOGEN FLD QL: NEGATIVE MG/DL — SIGNIFICANT CHANGE UP
WBC # BLD: 6.58 K/UL — SIGNIFICANT CHANGE UP (ref 3.8–10.5)
WBC # FLD AUTO: 6.58 K/UL — SIGNIFICANT CHANGE UP (ref 3.8–10.5)

## 2022-06-05 PROCEDURE — 71045 X-RAY EXAM CHEST 1 VIEW: CPT | Mod: 26

## 2022-06-05 PROCEDURE — 99233 SBSQ HOSP IP/OBS HIGH 50: CPT

## 2022-06-05 RX ORDER — HYDROMORPHONE HYDROCHLORIDE 2 MG/ML
0.5 INJECTION INTRAMUSCULAR; INTRAVENOUS; SUBCUTANEOUS ONCE
Refills: 0 | Status: DISCONTINUED | OUTPATIENT
Start: 2022-06-05 | End: 2022-06-05

## 2022-06-05 RX ORDER — MAGNESIUM SULFATE 500 MG/ML
2 VIAL (ML) INJECTION ONCE
Refills: 0 | Status: COMPLETED | OUTPATIENT
Start: 2022-06-05 | End: 2022-06-05

## 2022-06-05 RX ORDER — ACETAMINOPHEN 500 MG
1000 TABLET ORAL ONCE
Refills: 0 | Status: COMPLETED | OUTPATIENT
Start: 2022-06-05 | End: 2022-06-05

## 2022-06-05 RX ORDER — HALOPERIDOL DECANOATE 100 MG/ML
5 INJECTION INTRAMUSCULAR EVERY 8 HOURS
Refills: 0 | Status: DISCONTINUED | OUTPATIENT
Start: 2022-06-05 | End: 2022-06-07

## 2022-06-05 RX ORDER — METOPROLOL TARTRATE 50 MG
5 TABLET ORAL ONCE
Refills: 0 | Status: COMPLETED | OUTPATIENT
Start: 2022-06-05 | End: 2022-06-05

## 2022-06-05 RX ADMIN — Medication 5 MILLIGRAM(S): at 22:40

## 2022-06-05 RX ADMIN — Medication 65 MILLIGRAM(S): at 01:46

## 2022-06-05 RX ADMIN — Medication 65 MILLIGRAM(S): at 05:19

## 2022-06-05 RX ADMIN — Medication 65 MILLIGRAM(S): at 23:53

## 2022-06-05 RX ADMIN — HALOPERIDOL DECANOATE 5 MILLIGRAM(S): 100 INJECTION INTRAMUSCULAR at 11:13

## 2022-06-05 RX ADMIN — HYDROMORPHONE HYDROCHLORIDE 0.5 MILLIGRAM(S): 2 INJECTION INTRAMUSCULAR; INTRAVENOUS; SUBCUTANEOUS at 21:30

## 2022-06-05 RX ADMIN — Medication 10 MILLIGRAM(S): at 19:17

## 2022-06-05 RX ADMIN — Medication 100 MILLIGRAM(S): at 17:22

## 2022-06-05 RX ADMIN — ENOXAPARIN SODIUM 40 MILLIGRAM(S): 100 INJECTION SUBCUTANEOUS at 11:13

## 2022-06-05 RX ADMIN — SODIUM CHLORIDE 100 MILLILITER(S): 9 INJECTION, SOLUTION INTRAVENOUS at 05:19

## 2022-06-05 RX ADMIN — Medication 65 MILLIGRAM(S): at 17:22

## 2022-06-05 RX ADMIN — Medication 1 PATCH: at 07:22

## 2022-06-05 RX ADMIN — Medication 1 PATCH: at 19:22

## 2022-06-05 RX ADMIN — DEXMEDETOMIDINE HYDROCHLORIDE IN 0.9% SODIUM CHLORIDE 2.94 MICROGRAM(S)/KG/HR: 4 INJECTION INTRAVENOUS at 11:13

## 2022-06-05 RX ADMIN — Medication 1 MILLIGRAM(S): at 17:22

## 2022-06-05 RX ADMIN — Medication 65 MILLIGRAM(S): at 11:13

## 2022-06-05 RX ADMIN — Medication 1000 MILLIGRAM(S): at 20:17

## 2022-06-05 RX ADMIN — DEXMEDETOMIDINE HYDROCHLORIDE IN 0.9% SODIUM CHLORIDE 2.94 MICROGRAM(S)/KG/HR: 4 INJECTION INTRAVENOUS at 07:18

## 2022-06-05 RX ADMIN — Medication 400 MILLIGRAM(S): at 19:17

## 2022-06-05 RX ADMIN — HALOPERIDOL DECANOATE 5 MILLIGRAM(S): 100 INJECTION INTRAMUSCULAR at 22:41

## 2022-06-05 RX ADMIN — HYDROMORPHONE HYDROCHLORIDE 0.5 MILLIGRAM(S): 2 INJECTION INTRAMUSCULAR; INTRAVENOUS; SUBCUTANEOUS at 20:44

## 2022-06-05 RX ADMIN — Medication 25 GRAM(S): at 05:31

## 2022-06-05 NOTE — PROGRESS NOTE ADULT - SUBJECTIVE AND OBJECTIVE BOX
INTERVAL HPI/OVERNIGHT EVENTS:    Required precedex 0.8mcg/kg/min; after threatening staff and cursing.      CENTRAL LINE: [ ] YES [x ] NO  LOCATION:       BRIGHT: [ ] YES [x ] NO        A-LINE:  [ ] YES [x ] NO  LOCATION:       GLOBAL ISSUE/BEST PRACTICE:  Analgesia:   Sedation:dexMEDEtomidine Infusion  HOB elevation: yes  Stress ulcer prophylaxis:   VTE prophylaxis: lovenox  Oral Care: Chlorhexidine  Glycemic control:   Nutrition:Diet, DASH/TLC: Sodium & Cholesterol Restricted Supplement Feeding Modality:  Oral Ensure Enlive Cans or Servings Per Day:  1       Frequency:  Two Times a day     REVIEW OF SYSTEMS: [x] Unable to obtain because: sedated on precedex    PHYSICAL EXAM:    GENERAL: NAD, well-groomed, well-developed  HEAD:  Atraumatic, Normocephalic  EYES: EOMI, PERRLA, conjunctiva and sclera clear  ENMT: No tonsillar erythema, exudates, or enlargement; Moist mucous membranes, No lesions  NECK: Supple, No JVD, Normal thyroid  CHEST/LUNG: Clear to auscultation bilaterally; No rales, rhonchi, wheezing, or rubs  HEART: Regular rate and rhythm; No murmurs, rubs, or gallops  ABDOMEN: Soft, Nontender, Nondistended; Bowel sounds present  EXTREMITIES:  2+ Peripheral Pulses, 1+ edema of R hand, painful with movement of thumb, and with actively making a fist.  NERVOUS SYSTEM:  Awake and alert, confused at times, remains on precedex Motor Strength 5/5 B/L upper and lower extremities;    ICU Vital Signs Last 24 Hrs  T(C): 37.7 (05 Jun 2022 04:00), Max: 37.7 (05 Jun 2022 04:00)  T(F): 99.9 (05 Jun 2022 04:00), Max: 99.9 (05 Jun 2022 04:00)  HR: 93 (05 Jun 2022 07:02) (93 - 108)  BP: 156/103 (05 Jun 2022 07:02) (143/104 - 170/126)  BP(mean): 118 (05 Jun 2022 07:02) (109 - 140)  ABP: --  ABP(mean): --  RR: 14 (05 Jun 2022 07:02) (13 - 25)  SpO2: 97% (05 Jun 2022 07:02) (95% - 100%)    I&O's Detail    04 Jun 2022 07:01  -  05 Jun 2022 07:00  --------------------------------------------------------  IN:    Dexmedetomidine: 217.8 mL    IV PiggyBack: 50 mL    Lactated Ringers: 2200 mL  Total IN: 2467.8 mL    OUT:    Voided (mL): 2300 mL  Total OUT: 2300 mL    Total NET: 167.8 mL        MEDICATIONS  NEURO  Meds: dexMEDEtomidine Infusion 0.2 MICROgram(s)/kG/Hr (2.94 mL/Hr) IV Continuous <Continuous>  haloperidol    Injectable 5 milliGRAM(s) IntraMuscular at bedtime  haloperidol    Injectable 5 milliGRAM(s) IntraMuscular daily  ondansetron Injectable 4 milliGRAM(s) IV Push every 8 hours PRN Nausea and/or Vomiting  PHENobarbital Injectable 65 milliGRAM(s) IV Push every 6 hours    RESPIRATORY    Meds:   CARDIOVASCULAR  Meds: cloNIDine Patch 0.1 mG/24Hr(s) 1 patch Transdermal every 7 days  hydrALAZINE Injectable 10 milliGRAM(s) IV Push every 6 hours PRN sbp > 160    GI/NUTRITION  Meds:   GENITOURINARY  Meds: folic acid Injectable 1 milliGRAM(s) IV Push daily  lactated ringers. 1000 milliLiter(s) IV Continuous <Continuous>  thiamine Injectable 100 milliGRAM(s) IV Push daily    HEMATOLOGIC  Meds: enoxaparin Injectable 40 milliGRAM(s) SubCutaneous every 24 hours    [x] VTE Prophylaxis  INFECTIOUS DISEASES  Meds:   ENDOCRINE  CAPILLARY BLOOD GLUCOSE        Meds:   OTHER MEDICATIONS:  chlorhexidine 2% Cloths 1 Application(s) Topical daily  :    LABS:                        10.1   6.58  )-----------( 92       ( 05 Jun 2022 03:16 )             29.3      06-05    132<L>  |  101  |  5<L>  ----------------------------<  91  4.3   |  22  |  0.74    Ca    8.8      05 Jun 2022 03:16  Phos  3.0     06-05  Mg     1.7     06-05    TPro  5.9<L>  /  Alb  1.8<L>  /  TBili  0.2  /  DBili  x   /  AST  28  /  ALT  23  /  AlkPhos  110  06-05                  RADIOLOGY & ADDITIONAL STUDIES:

## 2022-06-05 NOTE — PROGRESS NOTE ADULT - ASSESSMENT
43 yo M with HTN, ETOH abuse, seizures a/w seizures in the setting of ETOH withdrawal, transferred to ICU for worsening withdrawal symptoms and elevated CIWA.     Neuro - Continue with phenobarb and precedex. Will check level today. Titrate to RASS 0 to -1.  Now off Keppra; add haldol for agitation every 8 hours.  Monitor QTc while on haldol.    CV - HTN - continue with clonidine patch and hydralzine PRN.   Pulm - Monitor Airway  GI - reg diet - continue with MVI, thiamine and folate  Heme - Lovenox for PPx  Renal - GREYSON, trend electrolytes and Cr; strict I and Os.   MSK - Xray and RUE US to neg fracture of hand specifically first digit given swelling and pain with ROM and neg DVT of RUE.    GOC - Full code  Dispo - remains on precedex, wean as tolerated, remains critically ill in ETOH withdrawal.

## 2022-06-06 LAB
ALBUMIN SERPL ELPH-MCNC: 2.1 G/DL — LOW (ref 3.3–5)
ALP SERPL-CCNC: 164 U/L — HIGH (ref 40–120)
ALT FLD-CCNC: 50 U/L — SIGNIFICANT CHANGE UP (ref 12–78)
ANION GAP SERPL CALC-SCNC: 9 MMOL/L — SIGNIFICANT CHANGE UP (ref 5–17)
AST SERPL-CCNC: 176 U/L — HIGH (ref 15–37)
BILIRUB SERPL-MCNC: 0.2 MG/DL — SIGNIFICANT CHANGE UP (ref 0.2–1.2)
BUN SERPL-MCNC: 8 MG/DL — SIGNIFICANT CHANGE UP (ref 7–23)
CALCIUM SERPL-MCNC: 8.8 MG/DL — SIGNIFICANT CHANGE UP (ref 8.5–10.1)
CHLORIDE SERPL-SCNC: 99 MMOL/L — SIGNIFICANT CHANGE UP (ref 96–108)
CO2 SERPL-SCNC: 25 MMOL/L — SIGNIFICANT CHANGE UP (ref 22–31)
CREAT SERPL-MCNC: 0.93 MG/DL — SIGNIFICANT CHANGE UP (ref 0.5–1.3)
EGFR: 106 ML/MIN/1.73M2 — SIGNIFICANT CHANGE UP
GLUCOSE SERPL-MCNC: 127 MG/DL — HIGH (ref 70–99)
HAV IGM SER-ACNC: SIGNIFICANT CHANGE UP
HBV CORE IGM SER-ACNC: SIGNIFICANT CHANGE UP
HBV SURFACE AG SER-ACNC: SIGNIFICANT CHANGE UP
HCT VFR BLD CALC: 29.8 % — LOW (ref 39–50)
HCV AB S/CO SERPL IA: 0.17 S/CO — SIGNIFICANT CHANGE UP (ref 0–0.99)
HCV AB SERPL-IMP: SIGNIFICANT CHANGE UP
HGB BLD-MCNC: 10.1 G/DL — LOW (ref 13–17)
LACTATE SERPL-SCNC: 0.8 MMOL/L — SIGNIFICANT CHANGE UP (ref 0.7–2)
LIDOCAIN IGE QN: 114 U/L — SIGNIFICANT CHANGE UP (ref 73–393)
MAGNESIUM SERPL-MCNC: 1.9 MG/DL — SIGNIFICANT CHANGE UP (ref 1.6–2.6)
MCHC RBC-ENTMCNC: 31.5 PG — SIGNIFICANT CHANGE UP (ref 27–34)
MCHC RBC-ENTMCNC: 33.9 G/DL — SIGNIFICANT CHANGE UP (ref 32–36)
MCV RBC AUTO: 92.8 FL — SIGNIFICANT CHANGE UP (ref 80–100)
NRBC # BLD: 0 /100 WBCS — SIGNIFICANT CHANGE UP (ref 0–0)
PHOSPHATE SERPL-MCNC: 3.7 MG/DL — SIGNIFICANT CHANGE UP (ref 2.5–4.5)
PLATELET # BLD AUTO: 263 K/UL — SIGNIFICANT CHANGE UP (ref 150–400)
POTASSIUM SERPL-MCNC: 4.5 MMOL/L — SIGNIFICANT CHANGE UP (ref 3.5–5.3)
POTASSIUM SERPL-SCNC: 4.5 MMOL/L — SIGNIFICANT CHANGE UP (ref 3.5–5.3)
PROCALCITONIN SERPL-MCNC: 1.16 NG/ML — HIGH (ref 0.02–0.1)
PROT SERPL-MCNC: 6.6 GM/DL — SIGNIFICANT CHANGE UP (ref 6–8.3)
RBC # BLD: 3.21 M/UL — LOW (ref 4.2–5.8)
RBC # FLD: 15.1 % — HIGH (ref 10.3–14.5)
SODIUM SERPL-SCNC: 133 MMOL/L — LOW (ref 135–145)
URATE SERPL-MCNC: 6.2 MG/DL — SIGNIFICANT CHANGE UP (ref 3.4–8.8)
WBC # BLD: 6.11 K/UL — SIGNIFICANT CHANGE UP (ref 3.8–10.5)
WBC # FLD AUTO: 6.11 K/UL — SIGNIFICANT CHANGE UP (ref 3.8–10.5)

## 2022-06-06 PROCEDURE — 76700 US EXAM ABDOM COMPLETE: CPT | Mod: 26

## 2022-06-06 PROCEDURE — 99291 CRITICAL CARE FIRST HOUR: CPT

## 2022-06-06 RX ORDER — PHENOBARBITAL 60 MG
65 TABLET ORAL EVERY 8 HOURS
Refills: 0 | Status: DISCONTINUED | OUTPATIENT
Start: 2022-06-06 | End: 2022-06-07

## 2022-06-06 RX ORDER — AMPICILLIN SODIUM AND SULBACTAM SODIUM 250; 125 MG/ML; MG/ML
3 INJECTION, POWDER, FOR SUSPENSION INTRAMUSCULAR; INTRAVENOUS EVERY 6 HOURS
Refills: 0 | Status: DISCONTINUED | OUTPATIENT
Start: 2022-06-06 | End: 2022-06-14

## 2022-06-06 RX ORDER — AMPICILLIN SODIUM AND SULBACTAM SODIUM 250; 125 MG/ML; MG/ML
3 INJECTION, POWDER, FOR SUSPENSION INTRAMUSCULAR; INTRAVENOUS ONCE
Refills: 0 | Status: COMPLETED | OUTPATIENT
Start: 2022-06-06 | End: 2022-06-06

## 2022-06-06 RX ORDER — AMPICILLIN SODIUM AND SULBACTAM SODIUM 250; 125 MG/ML; MG/ML
INJECTION, POWDER, FOR SUSPENSION INTRAMUSCULAR; INTRAVENOUS
Refills: 0 | Status: DISCONTINUED | OUTPATIENT
Start: 2022-06-06 | End: 2022-06-14

## 2022-06-06 RX ORDER — AMLODIPINE BESYLATE 2.5 MG/1
2.5 TABLET ORAL DAILY
Refills: 0 | Status: DISCONTINUED | OUTPATIENT
Start: 2022-06-06 | End: 2022-06-06

## 2022-06-06 RX ORDER — METOPROLOL TARTRATE 50 MG
25 TABLET ORAL ONCE
Refills: 0 | Status: COMPLETED | OUTPATIENT
Start: 2022-06-06 | End: 2022-06-06

## 2022-06-06 RX ORDER — AMLODIPINE BESYLATE 2.5 MG/1
5 TABLET ORAL DAILY
Refills: 0 | Status: DISCONTINUED | OUTPATIENT
Start: 2022-06-06 | End: 2022-06-14

## 2022-06-06 RX ORDER — METOPROLOL TARTRATE 50 MG
5 TABLET ORAL ONCE
Refills: 0 | Status: COMPLETED | OUTPATIENT
Start: 2022-06-06 | End: 2022-06-06

## 2022-06-06 RX ORDER — FOLIC ACID 0.8 MG
1 TABLET ORAL DAILY
Refills: 0 | Status: DISCONTINUED | OUTPATIENT
Start: 2022-06-06 | End: 2022-06-14

## 2022-06-06 RX ORDER — IBUPROFEN 200 MG
400 TABLET ORAL ONCE
Refills: 0 | Status: COMPLETED | OUTPATIENT
Start: 2022-06-06 | End: 2022-06-06

## 2022-06-06 RX ORDER — ACETAMINOPHEN 500 MG
650 TABLET ORAL EVERY 6 HOURS
Refills: 0 | Status: DISCONTINUED | OUTPATIENT
Start: 2022-06-06 | End: 2022-06-14

## 2022-06-06 RX ORDER — THIAMINE MONONITRATE (VIT B1) 100 MG
100 TABLET ORAL DAILY
Refills: 0 | Status: DISCONTINUED | OUTPATIENT
Start: 2022-06-06 | End: 2022-06-14

## 2022-06-06 RX ORDER — METOPROLOL TARTRATE 50 MG
25 TABLET ORAL EVERY 12 HOURS
Refills: 0 | Status: DISCONTINUED | OUTPATIENT
Start: 2022-06-06 | End: 2022-06-06

## 2022-06-06 RX ADMIN — HALOPERIDOL DECANOATE 5 MILLIGRAM(S): 100 INJECTION INTRAMUSCULAR at 06:16

## 2022-06-06 RX ADMIN — Medication 65 MILLIGRAM(S): at 22:46

## 2022-06-06 RX ADMIN — Medication 100 MILLIGRAM(S): at 18:03

## 2022-06-06 RX ADMIN — Medication 0.2 MILLIGRAM(S): at 18:03

## 2022-06-06 RX ADMIN — HALOPERIDOL DECANOATE 5 MILLIGRAM(S): 100 INJECTION INTRAMUSCULAR at 14:20

## 2022-06-06 RX ADMIN — AMPICILLIN SODIUM AND SULBACTAM SODIUM 200 GRAM(S): 250; 125 INJECTION, POWDER, FOR SUSPENSION INTRAMUSCULAR; INTRAVENOUS at 02:56

## 2022-06-06 RX ADMIN — AMPICILLIN SODIUM AND SULBACTAM SODIUM 200 GRAM(S): 250; 125 INJECTION, POWDER, FOR SUSPENSION INTRAMUSCULAR; INTRAVENOUS at 09:18

## 2022-06-06 RX ADMIN — Medication 1 MILLIGRAM(S): at 03:20

## 2022-06-06 RX ADMIN — Medication 400 MILLIGRAM(S): at 02:16

## 2022-06-06 RX ADMIN — Medication 25 MILLIGRAM(S): at 06:16

## 2022-06-06 RX ADMIN — Medication 1 MILLIGRAM(S): at 18:02

## 2022-06-06 RX ADMIN — Medication 650 MILLIGRAM(S): at 03:45

## 2022-06-06 RX ADMIN — AMLODIPINE BESYLATE 5 MILLIGRAM(S): 2.5 TABLET ORAL at 18:51

## 2022-06-06 RX ADMIN — AMPICILLIN SODIUM AND SULBACTAM SODIUM 200 GRAM(S): 250; 125 INJECTION, POWDER, FOR SUSPENSION INTRAMUSCULAR; INTRAVENOUS at 14:20

## 2022-06-06 RX ADMIN — Medication 650 MILLIGRAM(S): at 02:57

## 2022-06-06 RX ADMIN — AMPICILLIN SODIUM AND SULBACTAM SODIUM 200 GRAM(S): 250; 125 INJECTION, POWDER, FOR SUSPENSION INTRAMUSCULAR; INTRAVENOUS at 22:51

## 2022-06-06 RX ADMIN — Medication 65 MILLIGRAM(S): at 05:24

## 2022-06-06 RX ADMIN — Medication 25 MILLIGRAM(S): at 01:00

## 2022-06-06 RX ADMIN — HALOPERIDOL DECANOATE 5 MILLIGRAM(S): 100 INJECTION INTRAMUSCULAR at 22:46

## 2022-06-06 RX ADMIN — Medication 10 MILLIGRAM(S): at 01:37

## 2022-06-06 RX ADMIN — Medication 1 PATCH: at 07:23

## 2022-06-06 RX ADMIN — Medication 400 MILLIGRAM(S): at 01:21

## 2022-06-06 RX ADMIN — Medication 1 TABLET(S): at 18:05

## 2022-06-06 RX ADMIN — Medication 65 MILLIGRAM(S): at 14:20

## 2022-06-06 RX ADMIN — Medication 5 MILLIGRAM(S): at 05:24

## 2022-06-06 RX ADMIN — Medication 10 MILLIGRAM(S): at 15:56

## 2022-06-06 RX ADMIN — CHLORHEXIDINE GLUCONATE 1 APPLICATION(S): 213 SOLUTION TOPICAL at 06:24

## 2022-06-06 RX ADMIN — ENOXAPARIN SODIUM 40 MILLIGRAM(S): 100 INJECTION SUBCUTANEOUS at 14:20

## 2022-06-06 NOTE — PROVIDER CONTACT NOTE (EICU) - BACKGROUND
Vital Signs Last 24 Hrs  T(C): 38.6 (06 Jun 2022 02:15), Max: 38.6 (06 Jun 2022 02:15)  T(F): 101.5 (06 Jun 2022 02:15), Max: 101.5 (06 Jun 2022 02:15)  HR: 138 (06 Jun 2022 02:30) (82 - 142)  BP: 185/125 (06 Jun 2022 02:30) (115/97 - 200/117)  BP(mean): 143 (06 Jun 2022 02:30) (105 - 151)  RR: 30 (06 Jun 2022 02:30) (10 - 36)  SpO2: 97% (06 Jun 2022 02:30) (93% - 100%)

## 2022-06-06 NOTE — PROGRESS NOTE ADULT - SUBJECTIVE AND OBJECTIVE BOX
24 hr events:  weaned off precedex since yesterday evening  fever curve improved  started on Unasyn for suspected aspiration PNA  hypertensive and tachycardic overnight s/p lopressor IVP x2  calm overall, re-directable, confused and disoriented      ## ROS:  [x] limited due to confusion/encephalopathy   denies HA  denies SOB  denies CP  denies abdominal pain  denies pain in extremities      ## Labs:  CBC:                        10.1   6.11  )-----------( 263      ( 06 Jun 2022 02:55 )             29.8     Chem:  06-06    133<L>  |  99  |  8   ----------------------------<  127<H>  4.5   |  25  |  0.93    Ca    8.8      06 Jun 2022 02:55  Phos  3.7     06-06  Mg     1.9     06-06    TPro  6.6  /  Alb  2.1<L>  /  TBili  0.2  /  DBili  x   /  AST  176<H>  /  ALT  50  /  AlkPhos  164<H>  06-06    Lipase, Serum (06.06.22 @ 02:55)    Lipase, Serum: 114 U/L    Lactate, Blood (06.06.22 @ 11:26)    Lactate, Blood: 0.8 mmol/L    Procalcitonin, Serum (06.06.22 @ 09:46)    Procalcitonin, Serum: 1.16 ng/mL      ## Imaging:  CXR < from: Xray Chest 1 View- PORTABLE-Urgent (Xray Chest 1 View- PORTABLE-Urgent .) (06.05.22 @ 22:39) >  Mid lower lung field infiltrates are presently seen which are new since   May 30 of this year.    IMPRESSION: Bibasilar infiltrates.        ## Medications:     ampicillin/sulbactam  IVPB 3 Gram(s) IV Intermittent every 6 hours    amLODIPine   Tablet 5 milliGRAM(s) Oral daily  cloNIDine 0.2 milliGRAM(s) Oral every 12 hours  hydrALAZINE Injectable 10 milliGRAM(s) IV Push every 6 hours PRN        enoxaparin Injectable 40 milliGRAM(s) SubCutaneous every 24 hours      acetaminophen     Tablet .. 650 milliGRAM(s) Oral every 6 hours PRN  haloperidol    Injectable 5 milliGRAM(s) IntraMuscular every 8 hours  PHENobarbital Injectable 65 milliGRAM(s) IV Push every 8 hours      ## Vitals:  T(C): 37 (06-06-22 @ 19:30), Max: 38.8 (06-06-22 @ 04:00)  HR: 106 (06-06-22 @ 19:30) (90 - 142)  BP: 141/95 (06-06-22 @ 19:30) (132/93 - 200/117)  BP(mean): 110 (06-06-22 @ 19:30) (102 - 149)  RR: 19 (06-06-22 @ 19:30) (11 - 36)  SpO2: 97% (06-06-22 @ 19:30) (90% - 99%)        06-05 @ 07:01  -  06-06 @ 07:00  --------------------------------------------------------  IN: 303.8 mL / OUT: 3500 mL / NET: -3196.2 mL    06-06 @ 07:01  -  06-06 @ 19:42  --------------------------------------------------------  IN: 0 mL / OUT: 2600 mL / NET: -2600 mL          ## P/E:  Gen: lying comfortably in bed in no apparent distress  HEENT: PERRL, EOMI, sclera white, R forehead / eyebrow abrasion healing   Resp: scattered rhonchi, no wheeze  CVS: RRR, tachycardic at times  Abd: soft NT/ND +BS  Ext: no c/c/e, compartments soft, no tenderness to palpation arms or legs, full range of motion of ankles /wrists  Neuro: A&Ox1, moves all extremities spontaneously     CENTRAL LINE: [ ] YES [x ] NO  LOCATION:   DATE INSERTED:  REMOVE: [ ] YES [ ] NO      BRIGHT: [ ] YES [x ] NO    DATE INSERTED:  REMOVE:  [ ] YES [ ] NO      A-LINE:  [ ] YES [x ] NO  LOCATION:   DATE INSERTED:  REMOVE:  [ ] YES [ ] NO  EXPLAIN:    GLOBAL ISSUE/BEST PRACTICE:  Analgesia: n/a  Sedation: phenobarbital   HOB elevation: yes  Stress ulcer prophylaxis: n/a  VTE prophylaxis: lovenox   Oral Care: n/a  Glycemic control: n/a  Nutrition: po diet    CODE STATUS: [x ] full code  [ ] DNR  [ ] DNI  [ ] MOLST  Goals of care discussion: [ ] yes

## 2022-06-06 NOTE — PROGRESS NOTE ADULT - ASSESSMENT
42M PMH HTN, ETOH abuse, seizures brought in by ambulance for seizure with L tongue bite, R facial abrasion, scalp hematoma. Last drink was three days prior to admission. Admitted to medical floor 5/28 for seizure likely withdrawal seizure. Alcohol level <10. Course with ETOH withdrawal, DTs s/p code gray and RRT for CIWA 15. Transferred to ICU for agitation requiring sedation protocol, DTs/ETOH withdrawal, withdrawal seizure. Course with fevers.     DX: ETOH withdrawal, delirium tremens, alcohol abuse, withdrawal seizures, aspiration PNA, HTN, toxic metabolic encephalopathy, transaminitis     NEURO  tapering phenobarbital  on haldol (if agitation continues to improve and wean off haldol as well)  MVI, thiamine, folic acid  needs alcohol cessation (long term)  confusion now less so from alcohol withdrawal and likely more delirium and encephalopathy from fever, PNA, and being hospitalized for past week    CV  HTN: cont amlodipine, clonidine  prn hydralazine    PULM  HOB elevation  oxygenating well  no respiratory distress    ID  aspiration PNA  started on Unasyn (day #1)  follow up blood cx  UA negative  CXR with bibasilar infiltrates    GI  transaminitis ? from phenobarbital  tapering off phenobarbital  follow up abdominal US  follow up hepatitis panel  abdomen soft and NT  trend LFTs  lipase within normal limits   po diet as tolerates    MUSCULOSKELETAL   pt had complained of arm pain  xrays performed without evidence of fracture  exam today of extremities benign and pt without complaints of pain to extremities at present time    GEN  physical therapy  stable for transfer to medical floor if pt remains calm without precedex drip  DVT prophylaxis: lovenox

## 2022-06-06 NOTE — PROGRESS NOTE ADULT - CRITICAL CARE SERVICES PROVIDED
Patient is critically ill, requiring critical care services.

## 2022-06-06 NOTE — PROVIDER CONTACT NOTE (EICU) - SITUATION
43 yo M with HTN, ETOH abuse, seizures a/w seizures in the setting of ETOH withdrawal, transferred to ICU for worsening withdrawal symptoms and elevated CIWA.   Case discussed with the ICU PA.  Patient is somewhat agitated, hypertensive, Tachycardic now with an increase in Temperature.  Currently receiving Phenobarbital for withdrawal.

## 2022-06-07 LAB
ALBUMIN SERPL ELPH-MCNC: 2 G/DL — LOW (ref 3.3–5)
ALP SERPL-CCNC: 148 U/L — HIGH (ref 40–120)
ALT FLD-CCNC: 41 U/L — SIGNIFICANT CHANGE UP (ref 12–78)
ANION GAP SERPL CALC-SCNC: 9 MMOL/L — SIGNIFICANT CHANGE UP (ref 5–17)
AST SERPL-CCNC: 52 U/L — HIGH (ref 15–37)
BILIRUB SERPL-MCNC: 0.2 MG/DL — SIGNIFICANT CHANGE UP (ref 0.2–1.2)
BUN SERPL-MCNC: 10 MG/DL — SIGNIFICANT CHANGE UP (ref 7–23)
CALCIUM SERPL-MCNC: 8.9 MG/DL — SIGNIFICANT CHANGE UP (ref 8.5–10.1)
CHLORIDE SERPL-SCNC: 102 MMOL/L — SIGNIFICANT CHANGE UP (ref 96–108)
CO2 SERPL-SCNC: 26 MMOL/L — SIGNIFICANT CHANGE UP (ref 22–31)
CREAT SERPL-MCNC: 0.84 MG/DL — SIGNIFICANT CHANGE UP (ref 0.5–1.3)
EGFR: 113 ML/MIN/1.73M2 — SIGNIFICANT CHANGE UP
ERYTHROCYTE [SEDIMENTATION RATE] IN BLOOD: 101 MM/HR — HIGH (ref 0–15)
GLUCOSE SERPL-MCNC: 86 MG/DL — SIGNIFICANT CHANGE UP (ref 70–99)
HCT VFR BLD CALC: 29.2 % — LOW (ref 39–50)
HGB BLD-MCNC: 10 G/DL — LOW (ref 13–17)
MAGNESIUM SERPL-MCNC: 2.1 MG/DL — SIGNIFICANT CHANGE UP (ref 1.6–2.6)
MCHC RBC-ENTMCNC: 31.5 PG — SIGNIFICANT CHANGE UP (ref 27–34)
MCHC RBC-ENTMCNC: 34.2 G/DL — SIGNIFICANT CHANGE UP (ref 32–36)
MCV RBC AUTO: 92.1 FL — SIGNIFICANT CHANGE UP (ref 80–100)
NRBC # BLD: 0 /100 WBCS — SIGNIFICANT CHANGE UP (ref 0–0)
PHENOBARB SERPL-MCNC: 48.7 UG/ML — HIGH (ref 15–40)
PHOSPHATE SERPL-MCNC: 3.6 MG/DL — SIGNIFICANT CHANGE UP (ref 2.5–4.5)
PLATELET # BLD AUTO: 312 K/UL — SIGNIFICANT CHANGE UP (ref 150–400)
POTASSIUM SERPL-MCNC: 4.3 MMOL/L — SIGNIFICANT CHANGE UP (ref 3.5–5.3)
POTASSIUM SERPL-SCNC: 4.3 MMOL/L — SIGNIFICANT CHANGE UP (ref 3.5–5.3)
PROT SERPL-MCNC: 6.6 GM/DL — SIGNIFICANT CHANGE UP (ref 6–8.3)
RBC # BLD: 3.17 M/UL — LOW (ref 4.2–5.8)
RBC # FLD: 15.6 % — HIGH (ref 10.3–14.5)
SODIUM SERPL-SCNC: 137 MMOL/L — SIGNIFICANT CHANGE UP (ref 135–145)
TSH SERPL-MCNC: 1.32 UIU/ML — SIGNIFICANT CHANGE UP (ref 0.36–3.74)
WBC # BLD: 5.25 K/UL — SIGNIFICANT CHANGE UP (ref 3.8–10.5)
WBC # FLD AUTO: 5.25 K/UL — SIGNIFICANT CHANGE UP (ref 3.8–10.5)

## 2022-06-07 PROCEDURE — 99233 SBSQ HOSP IP/OBS HIGH 50: CPT

## 2022-06-07 RX ADMIN — Medication 65 MILLIGRAM(S): at 05:43

## 2022-06-07 RX ADMIN — AMPICILLIN SODIUM AND SULBACTAM SODIUM 200 GRAM(S): 250; 125 INJECTION, POWDER, FOR SUSPENSION INTRAMUSCULAR; INTRAVENOUS at 21:38

## 2022-06-07 RX ADMIN — ENOXAPARIN SODIUM 40 MILLIGRAM(S): 100 INJECTION SUBCUTANEOUS at 11:46

## 2022-06-07 RX ADMIN — Medication 0.2 MILLIGRAM(S): at 17:56

## 2022-06-07 RX ADMIN — HALOPERIDOL DECANOATE 5 MILLIGRAM(S): 100 INJECTION INTRAMUSCULAR at 05:43

## 2022-06-07 RX ADMIN — AMPICILLIN SODIUM AND SULBACTAM SODIUM 200 GRAM(S): 250; 125 INJECTION, POWDER, FOR SUSPENSION INTRAMUSCULAR; INTRAVENOUS at 10:27

## 2022-06-07 RX ADMIN — AMLODIPINE BESYLATE 5 MILLIGRAM(S): 2.5 TABLET ORAL at 05:43

## 2022-06-07 RX ADMIN — AMPICILLIN SODIUM AND SULBACTAM SODIUM 200 GRAM(S): 250; 125 INJECTION, POWDER, FOR SUSPENSION INTRAMUSCULAR; INTRAVENOUS at 05:46

## 2022-06-07 RX ADMIN — AMPICILLIN SODIUM AND SULBACTAM SODIUM 200 GRAM(S): 250; 125 INJECTION, POWDER, FOR SUSPENSION INTRAMUSCULAR; INTRAVENOUS at 16:58

## 2022-06-07 RX ADMIN — Medication 0.2 MILLIGRAM(S): at 06:37

## 2022-06-07 NOTE — SWALLOW BEDSIDE ASSESSMENT ADULT - SLP GENERAL OBSERVATIONS
alert and oriented to name,  and place; remained verbally interactive and aware of eating/swallowing context; made comments and answered questions related to context; followed directions

## 2022-06-07 NOTE — SWALLOW BEDSIDE ASSESSMENT ADULT - SWALLOW EVAL: PATIENT/FAMILY GOALS STATEMENT
Pt stated " I know when I drink too fast it makes me cough; they didn't give me anything to eat;  both were easy " (textures)

## 2022-06-07 NOTE — SWALLOW BEDSIDE ASSESSMENT ADULT - SWALLOW EVAL: DIAGNOSIS
Oropharyngeal dysphagia in setting of ETOH withdrawal, toxic metabolic encephalopathy; oral motor movements are slow but with functional strength /ROM; slow speech pattern is evident; Airway protection for swallowing is maintained across textures on exam Oropharyngeal dysphagia associated with toxic metabolic encephalopathy in setting of ETOH withdrawal ; oral motor movements are slow but with functional strength /ROM; slow speech pattern is evident; Airway protection for swallowing is maintained across textures on exam

## 2022-06-07 NOTE — SWALLOW BEDSIDE ASSESSMENT ADULT - SLP PERTINENT HISTORY OF CURRENT PROBLEM
seizures, MARTY, h L tongue bite, R facial abrasion, scalp hematoma. per H&P, Pt reports while walking outside his hand and R leg starting shaking and he felt dizzy (typical for him prior to seizures; which he has about 4x/yr); seizures with L tongue bite, R facial abrasion, scalp hematoma; MARTY;  per H&P, Pt reports while walking outside his hand and R leg starting shaking and he felt dizzy (typical for him prior to seizures; which he has about 4x/yr);

## 2022-06-07 NOTE — PROGRESS NOTE ADULT - TIME BILLING
Lab test review, Radiology Review, Vitals review, Consultant review and discussion, Physical examination, IDR, Assessment and plan; Plan discussion with patient
min spent reviewing chart, examining patient, discussing plan with patient and family

## 2022-06-07 NOTE — PROGRESS NOTE ADULT - ASSESSMENT
42M PMH HTN, ETOH abuse, seizures brought in by ambulance for seizure with L tongue bite, R facial abrasion, scalp hematoma. Last drink was three days prior to admission. Admitted to medical floor 5/28 for seizure likely withdrawal seizure. Alcohol level <10. Course with ETOH withdrawal, DTs s/p code gray and RRT for CIWA 15. Transferred to ICU for agitation requiring sedation protocol, DTs/ETOH withdrawal, withdrawal seizure. Course with fevers.       ETOH withdrawals   ETOH withdrawal seizure POA, EEG: benign  Toxic encephalopathy: will change CIWA protocol to symptom trigger and monitor mentation  s/p ICU course with Precedex drip   will continue with symptom trigger CIWA protocol w/ ativan   cont w/ MVI, thiamine, folic acid    Aspiration PNA  CXR with bibasilar infiltrates  tolerating room air thus far   continue with IV abx  f/u swallowing evaluation     HTN:   cont amlodipine, clonidine  prn hydralazine    DVT prophylaxis: lovenox

## 2022-06-07 NOTE — SWALLOW BEDSIDE ASSESSMENT ADULT - MODE OF PRESENTATION
self fed/fed by clinician difficulty grasping /holding cup with hands/cup/spoon/straw/self fed needed max assist and easily dropping items/spoon/self fed

## 2022-06-07 NOTE — SWALLOW BEDSIDE ASSESSMENT ADULT - SPECIFY REASON(S)
Clinical Swallow Evaluation; RN reports lethargic, sedated and r/o aspiration Clinical Swallow Evaluation for possible aspiration; RN reports lethargic sec sedation but improved mental status at this time

## 2022-06-07 NOTE — PROGRESS NOTE ADULT - SUBJECTIVE AND OBJECTIVE BOX
Patient is a 42y old  Male who presents with a chief complaint of Seizure, MARTY (2022 19:42)      INTERVAL HPI/ OVERNIGHT EVENTS: Pt was seen and examined at bedside today, ICU downgrade yesterday, pt is drowsy, he denies any tremors or other complaints.      MEDICATIONS  (STANDING):  amLODIPine   Tablet 5 milliGRAM(s) Oral daily  ampicillin/sulbactam  IVPB      ampicillin/sulbactam  IVPB 3 Gram(s) IV Intermittent every 6 hours  chlorhexidine 2% Cloths 1 Application(s) Topical daily  cloNIDine 0.2 milliGRAM(s) Oral every 12 hours  enoxaparin Injectable 40 milliGRAM(s) SubCutaneous every 24 hours  folic acid 1 milliGRAM(s) Oral daily  multivitamin 1 Tablet(s) Oral daily  thiamine 100 milliGRAM(s) Oral daily    MEDICATIONS  (PRN):  acetaminophen     Tablet .. 650 milliGRAM(s) Oral every 6 hours PRN Temp greater or equal to 38.5C (101.3F)  hydrALAZINE Injectable 10 milliGRAM(s) IV Push every 6 hours PRN sbp > 160  LORazepam     Tablet 2 milliGRAM(s) Oral every 2 hours PRN Symptom-triggered 2 point increase in CIWA-Ar  LORazepam   Injectable 2 milliGRAM(s) IV Push every 1 hour PRN Symptom-triggered: each CIWA -Ar score 8 or GREATER      Allergies    No Known Allergies    Intolerances        REVIEW OF SYSTEMS:    Unable to examine due to [ ] Encephalopathy [ ] Advanced Dementia [ ] Expressive Aphasia [ ] Non-verbal patient    CONSTITUTIONAL: No fever, positive generalized weakness/Fatigue, No weight loss  EYES: No eye pain, visual disturbances, or discharge  ENMT:  No difficulty hearing, tinnitus, vertigo; No sinus or throat pain  NECK: No pain or stiffness  RESPIRATORY: No shortness of breath,  cough, wheezing, sputum or hemoptysis   CARDIOVASCULAR: No chest pain, palpitations, or leg swelling  GASTROINTESTINAL: No abdominal pain. No nausea, vomiting, diarrhea or constipation. No melena or hematochezia.  GENITOURINARY: No dysuria, frequency, hematuria, or incontinence  NEUROLOGICAL: No headaches, Dizziness, memory loss, loss of strength, numbness, or tremors  SKIN: No itching, burning, rashes, or lesions   MUSCULOSKELETAL: No joint pain or swelling; No muscle, back, or extremity pain  PSYCHIATRIC: No depression, anxiety, mood swings, or difficulty sleeping  HEME/LYMPH: No easy bruising, or bleeding gums      Vital Signs Last 24 Hrs  T(C): 37 (2022 11:58), Max: 37 (2022 19:30)  T(F): 98.6 (2022 11:58), Max: 98.6 (2022 19:30)  HR: 91 (2022 11:58) (81 - 106)  BP: 138/90 (2022 11:58) (111/71 - 176/116)  BP(mean): 98 (2022 20:00) (98 - 135)  RR: 17 (2022 11:58) (11 - 21)  SpO2: 96% (2022 11:58) (95% - 97%)    PHYSICAL EXAM:  GENERAL: NAD, well-developed, well-groomed  HEAD:  Atraumatic, Normocephalic  EYES: conjunctiva and sclera clear  ENMT: Moist mucous membranes  NECK: Supple, No JVD, Normal thyroid  CHEST/LUNG: Clear to Auscultation bilaterally; No rales, rhonchi, wheezing, or rubs  HEART: Regular rate and rhythm; No murmurs, rubs, or gallops  ABDOMEN: Soft, Nontender, Nondistended; Bowel sounds present  EXTREMITIES:  2+ Peripheral Pulses, No clubbing, cyanosis, or edema  SKIN: No rashes or lesions  NERVOUS SYSTEM: drowsy, appears oriented and non-tremulous, motor strength appears intact     LABS:                        10.0   5.25  )-----------( 312      ( 2022 06:30 )             29.2     06-07    137  |  102  |  10  ----------------------------<  86  4.3   |  26  |  0.84    Ca    8.9      2022 06:30  Phos  3.6     06-07  Mg     2.1     06-07    TPro  6.6  /  Alb  2.0<L>  /  TBili  0.2  /  DBili  x   /  AST  52<H>  /  ALT  41  /  AlkPhos  148<H>  06-07      Urinalysis Basic - ( 2022 21:25 )    Color: Yellow / Appearance: Clear / S.010 / pH: x  Gluc: x / Ketone: Negative  / Bili: Negative / Urobili: Negative mg/dL   Blood: x / Protein: Negative mg/dL / Nitrite: Negative   Leuk Esterase: Negative / RBC: x / WBC x   Sq Epi: x / Non Sq Epi: x / Bacteria: x      CAPILLARY BLOOD GLUCOSE            Culture - Blood (collected 22)  Source: .Blood Blood  Preliminary Report (22):    No growth to date.    Culture - Blood (collected 22)  Source: .Blood Blood  Preliminary Report (22):    No growth to date.        RADIOLOGY & ADDITIONAL TESTS:          Imaging Personally Reviewed:  [ ] YES  [ ] NO    Consultant(s) Notes Reviewed:  [ ] YES  [ ] NO    Care Discussed with Consultants/Other Providers [x ] YES  [ ] NO

## 2022-06-07 NOTE — SWALLOW BEDSIDE ASSESSMENT ADULT - NS SPL SWALLOW CLINIC TRIAL FT
Right arm; consecutive straw drinking with airway protection maintained on exam ; no changes in breathing nor vocal quality

## 2022-06-07 NOTE — SWALLOW BEDSIDE ASSESSMENT ADULT - COMMENTS
o HTN, seizures, alcohol abuse (per ED visit in Jan however pt denies) presents biba s/p seizure.  5/28 CT head  Right frontal scalp hematoma without foreign body or fracture.   No acute intracranial bleeding.    Volume loss, greater than expected for patient's age. Finding may be   related to history of seizure and cranioplasty medication.    5/30 CXR  NG tube not visualized.  No radiographic evidence of active chest disease.  6/5 CXR Bibasilar infiltrates.  6/6 critical care note confusion now less so from alcohol withdrawal and likely more delirium and encephalopathy from fever, PNA, and being hospitalized for past week      6/7 MD note ICU downgrade yesterday, pt is drowsy, he denies any tremors or other complaints.ETOH withdrawals; toxic encephalopathy; Aspiration Pna PMH HTN, seizures, alcohol abuse (per ED visit in Jan however pt denies); biba s/p seizure  5/28 CT head Right frontal scalp hematoma without foreign body or fracture; No acute intracranial bleeding; Volume loss, greater than expected for patient's age. Finding may be related to history of seizure and cranioplasty medication.  5/30 CXR  NG tube not visualized. No radiographic evidence of active chest disease  6/5 CXR new Bibasilar infiltrates  6/6 critical care note confusion now less so from alcohol withdrawal and likely more delirium and encephalopathy from fever, PNA, and being hospitalized for past week  6/7 MD note ICU downgrade yesterday, pt is drowsy, he denies any tremors or other complaints; ETOH withdrawals; toxic encephalopathy; Aspiration Pna pt cleared "residue" in mouth with liquids

## 2022-06-07 NOTE — SWALLOW BEDSIDE ASSESSMENT ADULT - SWALLOW EVAL: RECOMMENDED DIET
At medical team discretion initiate Soft and Bite sized texture with THIN liquids; Controlled sips via cup/straw

## 2022-06-07 NOTE — SWALLOW BEDSIDE ASSESSMENT ADULT - ADDITIONAL RECOMMENDATIONS
Avoid consecutive cup drinking; Pills with puree as needed; Feed only when alert and accepting PO intake; Controlled size bolus; Pills with puree as needed; Feed only when alert and accepting PO intake; meticulous oral hygiene

## 2022-06-08 PROCEDURE — 99232 SBSQ HOSP IP/OBS MODERATE 35: CPT

## 2022-06-08 RX ORDER — IBUPROFEN 200 MG
400 TABLET ORAL ONCE
Refills: 0 | Status: COMPLETED | OUTPATIENT
Start: 2022-06-08 | End: 2022-06-08

## 2022-06-08 RX ORDER — IBUPROFEN 200 MG
400 TABLET ORAL EVERY 8 HOURS
Refills: 0 | Status: DISCONTINUED | OUTPATIENT
Start: 2022-06-08 | End: 2022-06-14

## 2022-06-08 RX ADMIN — Medication 0.2 MILLIGRAM(S): at 17:58

## 2022-06-08 RX ADMIN — Medication 1 MILLIGRAM(S): at 11:06

## 2022-06-08 RX ADMIN — AMPICILLIN SODIUM AND SULBACTAM SODIUM 200 GRAM(S): 250; 125 INJECTION, POWDER, FOR SUSPENSION INTRAMUSCULAR; INTRAVENOUS at 05:24

## 2022-06-08 RX ADMIN — AMPICILLIN SODIUM AND SULBACTAM SODIUM 200 GRAM(S): 250; 125 INJECTION, POWDER, FOR SUSPENSION INTRAMUSCULAR; INTRAVENOUS at 21:23

## 2022-06-08 RX ADMIN — Medication 400 MILLIGRAM(S): at 11:15

## 2022-06-08 RX ADMIN — AMLODIPINE BESYLATE 5 MILLIGRAM(S): 2.5 TABLET ORAL at 05:24

## 2022-06-08 RX ADMIN — Medication 100 MILLIGRAM(S): at 11:06

## 2022-06-08 RX ADMIN — Medication 1 TABLET(S): at 11:06

## 2022-06-08 RX ADMIN — AMPICILLIN SODIUM AND SULBACTAM SODIUM 200 GRAM(S): 250; 125 INJECTION, POWDER, FOR SUSPENSION INTRAMUSCULAR; INTRAVENOUS at 16:27

## 2022-06-08 RX ADMIN — Medication 400 MILLIGRAM(S): at 17:58

## 2022-06-08 RX ADMIN — Medication 400 MILLIGRAM(S): at 18:45

## 2022-06-08 RX ADMIN — Medication 0.2 MILLIGRAM(S): at 05:23

## 2022-06-08 RX ADMIN — Medication 400 MILLIGRAM(S): at 10:18

## 2022-06-08 RX ADMIN — ENOXAPARIN SODIUM 40 MILLIGRAM(S): 100 INJECTION SUBCUTANEOUS at 11:06

## 2022-06-08 RX ADMIN — AMPICILLIN SODIUM AND SULBACTAM SODIUM 200 GRAM(S): 250; 125 INJECTION, POWDER, FOR SUSPENSION INTRAMUSCULAR; INTRAVENOUS at 11:06

## 2022-06-08 NOTE — PROGRESS NOTE ADULT - SUBJECTIVE AND OBJECTIVE BOX
Patient is a 42y old  Male who presents with a chief complaint of Seizure, MARTY (07 Jun 2022 13:22)      INTERVAL HPI/OVERNIGHT EVENTS:    MEDICATIONS  (STANDING):  amLODIPine   Tablet 5 milliGRAM(s) Oral daily  ampicillin/sulbactam  IVPB      ampicillin/sulbactam  IVPB 3 Gram(s) IV Intermittent every 6 hours  chlorhexidine 2% Cloths 1 Application(s) Topical daily  cloNIDine 0.2 milliGRAM(s) Oral every 12 hours  enoxaparin Injectable 40 milliGRAM(s) SubCutaneous every 24 hours  folic acid 1 milliGRAM(s) Oral daily  multivitamin 1 Tablet(s) Oral daily  thiamine 100 milliGRAM(s) Oral daily    MEDICATIONS  (PRN):  acetaminophen     Tablet .. 650 milliGRAM(s) Oral every 6 hours PRN Temp greater or equal to 38.5C (101.3F)  hydrALAZINE Injectable 10 milliGRAM(s) IV Push every 6 hours PRN sbp > 160  ibuprofen  Tablet. 400 milliGRAM(s) Oral every 8 hours PRN Moderate Pain (4 - 6)  LORazepam     Tablet 2 milliGRAM(s) Oral every 2 hours PRN Symptom-triggered 2 point increase in CIWA-Ar  LORazepam   Injectable 2 milliGRAM(s) IV Push every 1 hour PRN Symptom-triggered: each CIWA -Ar score 8 or GREATER      Allergies    No Known Allergies    Intolerances        Vital Signs Last 24 Hrs  T(C): 37.2 (08 Jun 2022 17:53), Max: 37.2 (08 Jun 2022 17:53)  T(F): 99 (08 Jun 2022 17:53), Max: 99 (08 Jun 2022 17:53)  HR: 98 (08 Jun 2022 17:53) (86 - 98)  BP: 146/92 (08 Jun 2022 17:53) (124/78 - 146/92)  BP(mean): --  RR: 18 (08 Jun 2022 17:53) (18 - 18)  SpO2: 97% (08 Jun 2022 17:53) (96% - 98%)    PHYSICAL EXAM:  GENERAL: NAD, well-groomed, well-developed  HEAD:  Atraumatic, Normocephalic  EYES: EOMI, PERRLA   NECK: Supple   NERVOUS SYSTEM:  Alert & Oriented X3, Good concentration   CHEST/LUNG: Clear to auscultation bilaterally; No rales, rhonchi, wheezing, or rubs  HEART: Regular rate and rhythm; No murmurs, rubs, or gallops  ABDOMEN: Soft, Nontender, Nondistended; Bowel sounds present  EXTREMITIES: No clubbing, cyanosis, or edema     LABS:                        10.0   5.25  )-----------( 312      ( 07 Jun 2022 06:30 )             29.2     06-07    137  |  102  |  10  ----------------------------<  86  4.3   |  26  |  0.84    Ca    8.9      07 Jun 2022 06:30  Phos  3.6     06-07  Mg     2.1     06-07    TPro  6.6  /  Alb  2.0<L>  /  TBili  0.2  /  DBili  x   /  AST  52<H>  /  ALT  41  /  AlkPhos  148<H>  06-07        CAPILLARY BLOOD GLUCOSE          Culture - Blood (collected 06 Jun 2022 02:42)  Source: .Blood Blood  Preliminary Report (07 Jun 2022 03:01):    No growth to date.    Culture - Blood (collected 06 Jun 2022 02:42)  Source: .Blood Blood  Preliminary Report (07 Jun 2022 03:01):    No growth to date.      RADIOLOGY & ADDITIONAL TESTS:    06-07-22 @ 07:01  -  06-08-22 @ 07:00  --------------------------------------------------------  IN:  Total IN: 0 mL    OUT:    Voided (mL): 900 mL  Total OUT: 900 mL    Total NET: -900 mL      06-08-22 @ 07:01  -  06-08-22 @ 22:31  --------------------------------------------------------  IN:    IV PiggyBack: 300 mL  Total IN: 300 mL    OUT:  Total OUT: 0 mL    Total NET: 300 mL

## 2022-06-09 LAB
ANION GAP SERPL CALC-SCNC: 10 MMOL/L — SIGNIFICANT CHANGE UP (ref 5–17)
BUN SERPL-MCNC: 14 MG/DL — SIGNIFICANT CHANGE UP (ref 7–23)
CALCIUM SERPL-MCNC: 9.2 MG/DL — SIGNIFICANT CHANGE UP (ref 8.5–10.1)
CHLORIDE SERPL-SCNC: 104 MMOL/L — SIGNIFICANT CHANGE UP (ref 96–108)
CO2 SERPL-SCNC: 24 MMOL/L — SIGNIFICANT CHANGE UP (ref 22–31)
CREAT SERPL-MCNC: 0.99 MG/DL — SIGNIFICANT CHANGE UP (ref 0.5–1.3)
EGFR: 99 ML/MIN/1.73M2 — SIGNIFICANT CHANGE UP
GLUCOSE SERPL-MCNC: 117 MG/DL — HIGH (ref 70–99)
HCT VFR BLD CALC: 28.8 % — LOW (ref 39–50)
HGB BLD-MCNC: 10 G/DL — LOW (ref 13–17)
MAGNESIUM SERPL-MCNC: 2.1 MG/DL — SIGNIFICANT CHANGE UP (ref 1.6–2.6)
MCHC RBC-ENTMCNC: 32.2 PG — SIGNIFICANT CHANGE UP (ref 27–34)
MCHC RBC-ENTMCNC: 34.7 G/DL — SIGNIFICANT CHANGE UP (ref 32–36)
MCV RBC AUTO: 92.6 FL — SIGNIFICANT CHANGE UP (ref 80–100)
NRBC # BLD: 0 /100 WBCS — SIGNIFICANT CHANGE UP (ref 0–0)
PHOSPHATE SERPL-MCNC: 3.2 MG/DL — SIGNIFICANT CHANGE UP (ref 2.5–4.5)
PLATELET # BLD AUTO: 413 K/UL — HIGH (ref 150–400)
POTASSIUM SERPL-MCNC: 4.3 MMOL/L — SIGNIFICANT CHANGE UP (ref 3.5–5.3)
POTASSIUM SERPL-SCNC: 4.3 MMOL/L — SIGNIFICANT CHANGE UP (ref 3.5–5.3)
RBC # BLD: 3.11 M/UL — LOW (ref 4.2–5.8)
RBC # FLD: 15.3 % — HIGH (ref 10.3–14.5)
SODIUM SERPL-SCNC: 138 MMOL/L — SIGNIFICANT CHANGE UP (ref 135–145)
WBC # BLD: 5.6 K/UL — SIGNIFICANT CHANGE UP (ref 3.8–10.5)
WBC # FLD AUTO: 5.6 K/UL — SIGNIFICANT CHANGE UP (ref 3.8–10.5)

## 2022-06-09 PROCEDURE — 99232 SBSQ HOSP IP/OBS MODERATE 35: CPT

## 2022-06-09 RX ADMIN — Medication 10 MILLIGRAM(S): at 05:57

## 2022-06-09 RX ADMIN — Medication 0.2 MILLIGRAM(S): at 05:07

## 2022-06-09 RX ADMIN — Medication 0.2 MILLIGRAM(S): at 17:05

## 2022-06-09 RX ADMIN — Medication 400 MILLIGRAM(S): at 15:30

## 2022-06-09 RX ADMIN — Medication 10 MILLIGRAM(S): at 12:36

## 2022-06-09 RX ADMIN — Medication 400 MILLIGRAM(S): at 06:07

## 2022-06-09 RX ADMIN — CHLORHEXIDINE GLUCONATE 1 APPLICATION(S): 213 SOLUTION TOPICAL at 11:21

## 2022-06-09 RX ADMIN — AMLODIPINE BESYLATE 5 MILLIGRAM(S): 2.5 TABLET ORAL at 05:07

## 2022-06-09 RX ADMIN — Medication 400 MILLIGRAM(S): at 05:07

## 2022-06-09 RX ADMIN — Medication 400 MILLIGRAM(S): at 14:59

## 2022-06-09 RX ADMIN — Medication 100 MILLIGRAM(S): at 11:21

## 2022-06-09 RX ADMIN — Medication 1 MILLIGRAM(S): at 11:21

## 2022-06-09 RX ADMIN — AMPICILLIN SODIUM AND SULBACTAM SODIUM 200 GRAM(S): 250; 125 INJECTION, POWDER, FOR SUSPENSION INTRAMUSCULAR; INTRAVENOUS at 10:45

## 2022-06-09 RX ADMIN — Medication 10 MILLIGRAM(S): at 23:33

## 2022-06-09 RX ADMIN — AMPICILLIN SODIUM AND SULBACTAM SODIUM 200 GRAM(S): 250; 125 INJECTION, POWDER, FOR SUSPENSION INTRAMUSCULAR; INTRAVENOUS at 21:31

## 2022-06-09 RX ADMIN — Medication 1 TABLET(S): at 11:21

## 2022-06-09 RX ADMIN — AMPICILLIN SODIUM AND SULBACTAM SODIUM 200 GRAM(S): 250; 125 INJECTION, POWDER, FOR SUSPENSION INTRAMUSCULAR; INTRAVENOUS at 05:06

## 2022-06-09 RX ADMIN — AMPICILLIN SODIUM AND SULBACTAM SODIUM 200 GRAM(S): 250; 125 INJECTION, POWDER, FOR SUSPENSION INTRAMUSCULAR; INTRAVENOUS at 16:09

## 2022-06-09 RX ADMIN — ENOXAPARIN SODIUM 40 MILLIGRAM(S): 100 INJECTION SUBCUTANEOUS at 11:21

## 2022-06-09 NOTE — DIETITIAN INITIAL EVALUATION ADULT - NSPROEDAREADYLEARN_GEN_A_NUR
Pre-procedure checklist reviewed, AUC complete and pre-sedation note complete.    MD aware of maximum contrast dose of 270 mL.  acuteness of illness

## 2022-06-09 NOTE — PROGRESS NOTE ADULT - SUBJECTIVE AND OBJECTIVE BOX
Patient is a 42y old  Male who presents with a chief complaint of Seizure, MARTY (09 Jun 2022 12:04)      INTERVAL HPI/OVERNIGHT EVENTS:    MEDICATIONS  (STANDING):  amLODIPine   Tablet 5 milliGRAM(s) Oral daily  ampicillin/sulbactam  IVPB      ampicillin/sulbactam  IVPB 3 Gram(s) IV Intermittent every 6 hours  chlorhexidine 2% Cloths 1 Application(s) Topical daily  cloNIDine 0.2 milliGRAM(s) Oral every 12 hours  enoxaparin Injectable 40 milliGRAM(s) SubCutaneous every 24 hours  folic acid 1 milliGRAM(s) Oral daily  multivitamin 1 Tablet(s) Oral daily  thiamine 100 milliGRAM(s) Oral daily    MEDICATIONS  (PRN):  acetaminophen     Tablet .. 650 milliGRAM(s) Oral every 6 hours PRN Temp greater or equal to 38.5C (101.3F)  hydrALAZINE Injectable 10 milliGRAM(s) IV Push every 6 hours PRN sbp > 160  ibuprofen  Tablet. 400 milliGRAM(s) Oral every 8 hours PRN Moderate Pain (4 - 6)  LORazepam     Tablet 2 milliGRAM(s) Oral every 2 hours PRN Symptom-triggered 2 point increase in CIWA-Ar  LORazepam   Injectable 2 milliGRAM(s) IV Push every 1 hour PRN Symptom-triggered: each CIWA -Ar score 8 or GREATER      Allergies    No Known Allergies    Intolerances        Vital Signs Last 24 Hrs  T(C): 36.7 (09 Jun 2022 17:30), Max: 37.1 (08 Jun 2022 23:29)  T(F): 98.1 (09 Jun 2022 17:30), Max: 98.8 (09 Jun 2022 04:59)  HR: 82 (09 Jun 2022 17:30) (68 - 90)  BP: 143/76 (09 Jun 2022 17:30) (143/76 - 175/99)  BP(mean): --  RR: 18 (09 Jun 2022 17:30) (18 - 20)  SpO2: 98% (09 Jun 2022 17:30) (96% - 99%)    PHYSICAL EXAM:  GENERAL: NAD, well-groomed, well-developed  HEAD:  Atraumatic, Normocephalic  EYES: EOMI, PERRLA, conjunctiva and sclera clear  ENMT: No tonsillar erythema, exudates, or enlargement; Moist mucous membranes, Good dentition, No lesions  NECK: Supple, No JVD, Normal thyroid  NERVOUS SYSTEM:  Alert & Oriented X3, Good concentration; Motor Strength 5/5 B/L upper and lower extremities; DTRs 2+ intact and symmetric  CHEST/LUNG: Clear to auscultation bilaterally; No rales, rhonchi, wheezing, or rubs  HEART: Regular rate and rhythm; No murmurs, rubs, or gallops  ABDOMEN: Soft, Nontender, Nondistended; Bowel sounds present  EXTREMITIES:  2+ Peripheral Pulses, No clubbing, cyanosis, or edema  LYMPH: No lymphadenopathy noted  SKIN: No rashes or lesions    LABS:                        10.0   5.60  )-----------( 413      ( 09 Jun 2022 07:52 )             28.8     06-09    138  |  104  |  14  ----------------------------<  117<H>  4.3   |  24  |  0.99    Ca    9.2      09 Jun 2022 07:52  Phos  3.2     06-09  Mg     2.1     06-09          CAPILLARY BLOOD GLUCOSE          Culture - Blood (collected 06 Jun 2022 02:42)  Source: .Blood Blood  Preliminary Report (07 Jun 2022 03:01):    No growth to date.    Culture - Blood (collected 06 Jun 2022 02:42)  Source: .Blood Blood  Preliminary Report (07 Jun 2022 03:01):    No growth to date.      RADIOLOGY & ADDITIONAL TESTS:    06-08-22 @ 07:01  -  06-09-22 @ 07:00  --------------------------------------------------------  IN:    IV PiggyBack: 300 mL  Total IN: 300 mL    OUT:    Voided (mL): 1000 mL  Total OUT: 1000 mL    Total NET: -700 mL      06-09-22 @ 07:01  -  06-09-22 @ 21:12  --------------------------------------------------------  IN:    IV PiggyBack: 200 mL    Oral Fluid: 780 mL  Total IN: 980 mL    OUT:    Voided (mL): 1750 mL  Total OUT: 1750 mL    Total NET: -770 mL       41 yo M w/ history of HTN, ETOH abuse, seizures brought in by ambulance for seizure with L tongue bite, R facial abrasion, scalp hematoma & was Admitted to medical floor on 5/28 for seizure likely withdrawal seizure, MARTY and thrombocytopenia. His hospital course was - no AEDs as per neurologythdrawal w/ DTs & CIWA 15, requiring transferred to ICU on Precedex drip w/ phenobarbital on 5/30 w/ unasly for aspiration PNA started on 6/5. Pt improved and was transferred to the medical floor on 6/6. He is lying in bed in NAD.    MEDICATIONS  (STANDING):  amLODIPine   Tablet 5 milliGRAM(s) Oral daily  ampicillin/sulbactam  IVPB      ampicillin/sulbactam  IVPB 3 Gram(s) IV Intermittent every 6 hours  chlorhexidine 2% Cloths 1 Application(s) Topical daily  cloNIDine 0.2 milliGRAM(s) Oral every 12 hours  enoxaparin Injectable 40 milliGRAM(s) SubCutaneous every 24 hours  folic acid 1 milliGRAM(s) Oral daily  multivitamin 1 Tablet(s) Oral daily  thiamine 100 milliGRAM(s) Oral daily    MEDICATIONS  (PRN):  acetaminophen     Tablet .. 650 milliGRAM(s) Oral every 6 hours PRN Temp greater or equal to 38.5C (101.3F)  hydrALAZINE Injectable 10 milliGRAM(s) IV Push every 6 hours PRN sbp > 160  ibuprofen  Tablet. 400 milliGRAM(s) Oral every 8 hours PRN Moderate Pain (4 - 6)  LORazepam     Tablet 2 milliGRAM(s) Oral every 2 hours PRN Symptom-triggered 2 point increase in CIWA-Ar  LORazepam   Injectable 2 milliGRAM(s) IV Push every 1 hour PRN Symptom-triggered: each CIWA -Ar score 8 or GREATER      Allergies    No Known Allergies    Intolerances        Vital Signs Last 24 Hrs  T(C): 36.7 (09 Jun 2022 17:30), Max: 37.1 (08 Jun 2022 23:29)  T(F): 98.1 (09 Jun 2022 17:30), Max: 98.8 (09 Jun 2022 04:59)  HR: 82 (09 Jun 2022 17:30) (68 - 90)  BP: 143/76 (09 Jun 2022 17:30) (143/76 - 175/99)  BP(mean): --  RR: 18 (09 Jun 2022 17:30) (18 - 20)  SpO2: 98% (09 Jun 2022 17:30) (96% - 99%)    PHYSICAL EXAM:  GENERAL: NAD, well-groomed, well-developed  HEAD:  Atraumatic, Normocephalic  EYES: EOMI, PERRLA,   NECK: Supple   NERVOUS SYSTEM:  Alert    CHEST/LUNG: Clear to auscultation bilaterally; No rales, rhonchi, wheezing, or rubs  HEART: Regular rate and rhythm; No murmurs, rubs, or gallops  ABDOMEN: Soft, Nontender, Nondistended; Bowel sounds present  EXTREMITIES: No clubbing, cyanosis, or edema     LABS:                        10.0   5.60  )-----------( 413      ( 09 Jun 2022 07:52 )             28.8     06-09    138  |  104  |  14  ----------------------------<  117<H>  4.3   |  24  |  0.99    Ca    9.2      09 Jun 2022 07:52  Phos  3.2     06-09  Mg     2.1     06-09          CAPILLARY BLOOD GLUCOSE          Culture - Blood (collected 06 Jun 2022 02:42)  Source: .Blood Blood  Preliminary Report (07 Jun 2022 03:01):    No growth to date.    Culture - Blood (collected 06 Jun 2022 02:42)  Source: .Blood Blood  Preliminary Report (07 Jun 2022 03:01):    No growth to date.      RADIOLOGY & ADDITIONAL TESTS:    06-08-22 @ 07:01  -  06-09-22 @ 07:00  --------------------------------------------------------  IN:    IV PiggyBack: 300 mL  Total IN: 300 mL    OUT:    Voided (mL): 1000 mL  Total OUT: 1000 mL    Total NET: -700 mL      06-09-22 @ 07:01  -  06-09-22 @ 21:12  --------------------------------------------------------  IN:    IV PiggyBack: 200 mL    Oral Fluid: 780 mL  Total IN: 980 mL    OUT:    Voided (mL): 1750 mL  Total OUT: 1750 mL    Total NET: -770 mL

## 2022-06-09 NOTE — PROGRESS NOTE ADULT - ASSESSMENT
42M PMH HTN, ETOH abuse, seizures brought in by ambulance for seizure with L tongue bite, R facial abrasion, scalp hematoma. Last drink was three days prior to admission. Admitted to medical floor 5/28 for seizure likely withdrawal seizure. Alcohol level <10. Course with ETOH withdrawal, DTs s/p code gray and RRT for CIWA 15. Transferred to ICU for agitation requiring sedation protocol, DTs/ETOH withdrawal, withdrawal seizure. Course with fevers.     Aspiration PNA  CXR with bibasilar infiltrates  tolerating room air thus far   continue with IV abx  f/u swallowing evaluation     ETOH withdrawals   ETOH withdrawal seizure POA, EEG: benign  Toxic encephalopathy: will change CIWA protocol to symptom trigger and monitor mentation  continue with IV antibioticsPrecedex drip   will continue with symptom trigger CIWA protocol w/ ativan   cont w/ MVI, thiamine, folic acid    HTN:   cont amlodipine, clonidine  prn hydralazine    DVT prophylaxis: Lovenox   41 yo M w/ history of HTN, ETOH abuse, seizures brought in by ambulance for seizure with L tongue bite, R facial abrasion, scalp hematoma & was Admitted to medical floor on 5/28 for seizure likely withdrawal seizure. His hospital course was complicated by ETOH withdrawal w/ DTs & CIWA 15, requiring transferred to ICU on Precedex drip w/ ativan taper & aspiration PNA.     Aspiration PNA  - CXR showed bibasilar infiltrates on 6/5  - patient tolerating room air   - c/w Unasyn  - patient passed swallowing evaluation     Right foot pain  - will get x-rays     ETOH withdrawal w/ Toxic encephalopathy  - resolved  - EEG: benign  - c/w MVI, thiamine, folic acid    HTN  - c/w amlodipine & clonidine     Prophylaxis:  DVT: Lovenox  GI: PO diet   43 yo M w/ history of HTN, ETOH abuse, seizures brought in by ambulance for seizure with L tongue bite, R facial abrasion, scalp hematoma & was Admitted to medical floor on 5/28 for seizure likely withdrawal seizure, MARTY and thrombocytopenia. His hospital course was - no AEDs as per neurologythdrawal w/ DTs & CIWA 15, requiring transferred to ICU on Precedex drip w/ phenobarbital on 5/30 w/ unasly for aspiration PNA started on 6/5. Pt improved and was transferred to the medical floor on 6/6.     Aspiration PNA  - CXR showed bibasilar infiltrates on 6/5  - patient tolerating room air   - c/w Unasyn  - patient passed swallowing evaluation     Right foot pain  - no AEDs as per neurology    ETOH withdrawal w/ Toxic encephalopathy  - resolved  - EEG benign  - c/w MVI, thiamine, folic acid    Seizure  - EEG was normal w/ no seizure focus on 5/29  - no AEDs as per neuro  - will get MRI Brain w/ and wo as per neurology    HTN  - c/w amlodipine & clonidine     thrombocytopenia  - resolved  - likely due to alcohol use    Prophylaxis:  DVT: Lovenox  GI: PO diet    Pt recommended rehab 41 yo M w/ history of HTN, ETOH abuse, seizures brought in by ambulance for seizure with L tongue bite, R facial abrasion, scalp hematoma & was Admitted to medical floor on 5/28 for seizure likely withdrawal seizure, MARTY and thrombocytopenia. His hospital course was - no AEDs as per neurologythdrawal w/ DTs & CIWA 15, requiring transferred to ICU on Precedex drip w/ phenobarbital on 5/30 w/ unasly for aspiration PNA started on 6/5. Pt improved and was transferred to the medical floor on 6/6.     Aspiration PNA  - CXR showed bibasilar infiltrates on 6/5  - patient tolerating room air   - c/w Unasyn  - patient passed swallowing evaluation     Right foot pain  - no obvious cause on exam  - will get X-rays   - c/w PRN ibuprofen     ETOH withdrawal w/ Toxic encephalopathy  - resolved  - EEG benign  - c/w MVI, thiamine, folic acid    Seizure  - EEG was normal w/ no seizure focus on 5/29  - no AEDs as per neuro  - will get MRI Brain w/ and wo as per neurology    HTN  - c/w amlodipine & clonidine     thrombocytopenia  - resolved  - likely due to alcohol use    Prophylaxis:  DVT: Lovenox  GI: PO diet    Pt recommended rehab

## 2022-06-09 NOTE — PROGRESS NOTE ADULT - SUBJECTIVE AND OBJECTIVE BOX
Pt resting comfortably, no acute events.     Physical Exam:   · Neurological	detailed exam  · Mental Status	Awake, alert appropriate language intact. . AOX3.   · Cranial Nerve	PERRLA Face symmetric, Tracks.   · Motor	Moving all extremities spontaneously  Toes downgoing  · Sensory	withdraws equally in all extr       · Assessment	  Seizures without clear history and history of etoh abuse  HTN  Diffuse Cerebral and cerebellar atrophy   right extracranial hematoma       Plan  EEG normal, no seizure focus  no AEDs at this time  MRI Brain w/ and wo dc'ed by primary team   SU CASTILLOI  rest as per primary team  neuro stable, follow up outpt

## 2022-06-10 LAB
FLUAV AG NPH QL: SIGNIFICANT CHANGE UP
FLUBV AG NPH QL: SIGNIFICANT CHANGE UP
SARS-COV-2 RNA SPEC QL NAA+PROBE: SIGNIFICANT CHANGE UP

## 2022-06-10 PROCEDURE — 73090 X-RAY EXAM OF FOREARM: CPT | Mod: 26,LT

## 2022-06-10 PROCEDURE — 73630 X-RAY EXAM OF FOOT: CPT | Mod: 26,RT

## 2022-06-10 PROCEDURE — 99232 SBSQ HOSP IP/OBS MODERATE 35: CPT

## 2022-06-10 PROCEDURE — 73130 X-RAY EXAM OF HAND: CPT | Mod: 26,LT

## 2022-06-10 RX ADMIN — Medication 400 MILLIGRAM(S): at 06:53

## 2022-06-10 RX ADMIN — AMPICILLIN SODIUM AND SULBACTAM SODIUM 200 GRAM(S): 250; 125 INJECTION, POWDER, FOR SUSPENSION INTRAMUSCULAR; INTRAVENOUS at 05:52

## 2022-06-10 RX ADMIN — Medication 1 TABLET(S): at 11:55

## 2022-06-10 RX ADMIN — AMPICILLIN SODIUM AND SULBACTAM SODIUM 200 GRAM(S): 250; 125 INJECTION, POWDER, FOR SUSPENSION INTRAMUSCULAR; INTRAVENOUS at 21:33

## 2022-06-10 RX ADMIN — Medication 0.2 MILLIGRAM(S): at 05:53

## 2022-06-10 RX ADMIN — CHLORHEXIDINE GLUCONATE 1 APPLICATION(S): 213 SOLUTION TOPICAL at 11:54

## 2022-06-10 RX ADMIN — AMPICILLIN SODIUM AND SULBACTAM SODIUM 200 GRAM(S): 250; 125 INJECTION, POWDER, FOR SUSPENSION INTRAMUSCULAR; INTRAVENOUS at 09:39

## 2022-06-10 RX ADMIN — Medication 1 MILLIGRAM(S): at 11:55

## 2022-06-10 RX ADMIN — ENOXAPARIN SODIUM 40 MILLIGRAM(S): 100 INJECTION SUBCUTANEOUS at 11:54

## 2022-06-10 RX ADMIN — AMLODIPINE BESYLATE 5 MILLIGRAM(S): 2.5 TABLET ORAL at 05:54

## 2022-06-10 RX ADMIN — AMPICILLIN SODIUM AND SULBACTAM SODIUM 200 GRAM(S): 250; 125 INJECTION, POWDER, FOR SUSPENSION INTRAMUSCULAR; INTRAVENOUS at 16:22

## 2022-06-10 RX ADMIN — Medication 100 MILLIGRAM(S): at 11:55

## 2022-06-10 RX ADMIN — Medication 0.2 MILLIGRAM(S): at 17:24

## 2022-06-10 RX ADMIN — Medication 400 MILLIGRAM(S): at 05:53

## 2022-06-10 RX ADMIN — Medication 10 MILLIGRAM(S): at 05:53

## 2022-06-10 NOTE — PROGRESS NOTE ADULT - SUBJECTIVE AND OBJECTIVE BOX
43 yo M w/ history of HTN, ETOH abuse, seizures brought in by ambulance for seizure with L tongue bite, R facial abrasion, scalp hematoma & was Admitted to medical floor on 5/28 for seizure likely withdrawal seizure, MARTY and thrombocytopenia. His hospital course was - no AEDs as per neurologythdrawal w/ DTs & CIWA 15, requiring transferred to ICU on Precedex drip w/ phenobarbital on 5/30 w/ unasly for aspiration PNA started on 6/5. Pt improved and was transferred to the medical floor on 6/6. He is lying in bed in NAD.     MEDICATIONS  (STANDING):  amLODIPine   Tablet 5 milliGRAM(s) Oral daily  ampicillin/sulbactam  IVPB      ampicillin/sulbactam  IVPB 3 Gram(s) IV Intermittent every 6 hours  chlorhexidine 2% Cloths 1 Application(s) Topical daily  cloNIDine 0.2 milliGRAM(s) Oral every 12 hours  enoxaparin Injectable 40 milliGRAM(s) SubCutaneous every 24 hours  folic acid 1 milliGRAM(s) Oral daily  multivitamin 1 Tablet(s) Oral daily  thiamine 100 milliGRAM(s) Oral daily    MEDICATIONS  (PRN):  acetaminophen     Tablet .. 650 milliGRAM(s) Oral every 6 hours PRN Temp greater or equal to 38.5C (101.3F)  ibuprofen  Tablet. 400 milliGRAM(s) Oral every 8 hours PRN Moderate Pain (4 - 6)  LORazepam     Tablet 2 milliGRAM(s) Oral every 2 hours PRN Symptom-triggered 2 point increase in CIWA-Ar  LORazepam   Injectable 2 milliGRAM(s) IV Push every 1 hour PRN Symptom-triggered: each CIWA -Ar score 8 or GREATER      Allergies    No Known Allergies    Intolerances        Vital Signs Last 24 Hrs  T(C): 36.7 (10 Chris 2022 17:37), Max: 36.9 (10 Chris 2022 16:31)  T(F): 98.1 (10 Chris 2022 17:37), Max: 98.5 (10 Chris 2022 16:31)  HR: 87 (10 Chris 2022 17:37) (75 - 96)  BP: 143/78 (10 Chris 2022 17:37) (123/79 - 173/98)  BP(mean): --  RR: 18 (10 Chris 2022 17:37) (18 - 18)  SpO2: 97% (10 Chris 2022 17:37) (96% - 98%)    PHYSICAL EXAM:  GENERAL: NAD, well-groomed, well-developed  HEAD:  Atraumatic, Normocephalic  EYES: EOMI, PERRLA,   NECK: Supple   NERVOUS SYSTEM:  Alert    CHEST/LUNG: Clear to auscultation bilaterally; No rales, rhonchi, wheezing, or rubs  HEART: Regular rate and rhythm; No murmurs, rubs, or gallops  ABDOMEN: Soft, Nontender, Nondistended; Bowel sounds present  EXTREMITIES: No clubbing, cyanosis, or edema       LABS:                        10.0   5.60  )-----------( 413      ( 09 Jun 2022 07:52 )             28.8     06-09    138  |  104  |  14  ----------------------------<  117<H>  4.3   |  24  |  0.99    Ca    9.2      09 Jun 2022 07:52  Phos  3.2     06-09  Mg     2.1     06-09          CAPILLARY BLOOD GLUCOSE          Culture - Blood (collected 06 Jun 2022 02:42)  Source: .Blood Blood  Preliminary Report (07 Jun 2022 03:01):    No growth to date.    Culture - Blood (collected 06 Jun 2022 02:42)  Source: .Blood Blood  Preliminary Report (07 Jun 2022 03:01):    No growth to date.      RADIOLOGY & ADDITIONAL TESTS:    06-09-22 @ 07:01  -  06-10-22 @ 07:00  --------------------------------------------------------  IN:    IV PiggyBack: 400 mL    Oral Fluid: 900 mL  Total IN: 1300 mL    OUT:    Voided (mL): 1750 mL  Total OUT: 1750 mL    Total NET: -450 mL      06-10-22 @ 07:01  -  06-10-22 @ 23:24  --------------------------------------------------------  IN:    Oral Fluid: 940 mL  Total IN: 940 mL    OUT:  Total OUT: 0 mL    Total NET: 940 mL

## 2022-06-10 NOTE — PROGRESS NOTE ADULT - ASSESSMENT
41 yo M w/ history of HTN, ETOH abuse, seizures brought in by ambulance for seizure with L tongue bite, R facial abrasion, scalp hematoma & was Admitted to medical floor on 5/28 for seizure likely withdrawal seizure, MARTY and thrombocytopenia. His hospital course was - no AEDs as per neurologythdrawal w/ DTs & CIWA 15, requiring transferred to ICU on Precedex drip w/ phenobarbital on 5/30 w/ unasly for aspiration PNA started on 6/5. Pt improved and was transferred to the medical floor on 6/6.     Aspiration PNA  - CXR showed bibasilar infiltrates on 6/5  - patient tolerating room air   - c/w Unasyn  - patient passed swallowing evaluation     Right foot pain  - no obvious cause on exam  - will get X-rays   - c/w PRN ibuprofen   - consult podiatry if pain does not improve    ETOH withdrawal w/ Toxic encephalopathy  - resolved  - EEG benign  - c/w MVI, thiamine, folic acid    Seizure  - EEG was normal w/ no seizure focus on 5/29  - no AEDs as per neuro  - will get MRI Brain w/ and wo as per neurology    HTN  - c/w amlodipine & clonidine     thrombocytopenia  - resolved  - likely due to alcohol use    Prophylaxis:  DVT: Lovenox  GI: PO diet    Pt recommended rehab 41 yo M w/ history of HTN, ETOH abuse, seizures brought in by ambulance for seizure with L tongue bite, R facial abrasion, scalp hematoma & was Admitted to medical floor on 5/28 for seizure likely withdrawal seizure, MARTY and thrombocytopenia. His hospital course was - no AEDs as per neurologythdrawal w/ DTs & CIWA 15, requiring transferred to ICU on Precedex drip w/ phenobarbital on 5/30 w/ unasly for aspiration PNA started on 6/5. Pt improved and was transferred to the medical floor on 6/6.     Aspiration PNA  - CXR showed bibasilar infiltrates on 6/5  - patient tolerating room air   - c/w Unasyn  - patient passed swallowing evaluation     Right foot pain  - no obvious cause on exam  - will get X-rays   - c/w PRN ibuprofen   - consult podiatry if pain does not improve    left wrist pain  - no obvious cause on exam  - will get X-rays     ETOH withdrawal w/ Toxic encephalopathy  - resolved  - EEG benign  - c/w MVI, thiamine, folic acid    Seizure  - EEG was normal w/ no seizure focus on 5/29  - no AEDs as per neuro  - will get MRI Brain w/ and wo as per neurology    HTN  - c/w amlodipine & clonidine     thrombocytopenia  - resolved  - likely due to alcohol use    Prophylaxis:  DVT: Lovenox  GI: PO diet    Pt recommended rehab

## 2022-06-11 LAB
CULTURE RESULTS: SIGNIFICANT CHANGE UP
CULTURE RESULTS: SIGNIFICANT CHANGE UP
SPECIMEN SOURCE: SIGNIFICANT CHANGE UP
SPECIMEN SOURCE: SIGNIFICANT CHANGE UP

## 2022-06-11 PROCEDURE — 99232 SBSQ HOSP IP/OBS MODERATE 35: CPT

## 2022-06-11 RX ADMIN — Medication 400 MILLIGRAM(S): at 12:07

## 2022-06-11 RX ADMIN — AMPICILLIN SODIUM AND SULBACTAM SODIUM 200 GRAM(S): 250; 125 INJECTION, POWDER, FOR SUSPENSION INTRAMUSCULAR; INTRAVENOUS at 17:04

## 2022-06-11 RX ADMIN — Medication 0.2 MILLIGRAM(S): at 17:03

## 2022-06-11 RX ADMIN — AMPICILLIN SODIUM AND SULBACTAM SODIUM 200 GRAM(S): 250; 125 INJECTION, POWDER, FOR SUSPENSION INTRAMUSCULAR; INTRAVENOUS at 05:59

## 2022-06-11 RX ADMIN — ENOXAPARIN SODIUM 40 MILLIGRAM(S): 100 INJECTION SUBCUTANEOUS at 11:06

## 2022-06-11 RX ADMIN — Medication 0.2 MILLIGRAM(S): at 06:00

## 2022-06-11 RX ADMIN — Medication 1 MILLIGRAM(S): at 11:07

## 2022-06-11 RX ADMIN — Medication 1 TABLET(S): at 11:06

## 2022-06-11 RX ADMIN — AMPICILLIN SODIUM AND SULBACTAM SODIUM 200 GRAM(S): 250; 125 INJECTION, POWDER, FOR SUSPENSION INTRAMUSCULAR; INTRAVENOUS at 21:52

## 2022-06-11 RX ADMIN — Medication 400 MILLIGRAM(S): at 11:07

## 2022-06-11 RX ADMIN — CHLORHEXIDINE GLUCONATE 1 APPLICATION(S): 213 SOLUTION TOPICAL at 11:09

## 2022-06-11 RX ADMIN — Medication 100 MILLIGRAM(S): at 11:06

## 2022-06-11 RX ADMIN — AMLODIPINE BESYLATE 5 MILLIGRAM(S): 2.5 TABLET ORAL at 06:00

## 2022-06-11 RX ADMIN — AMPICILLIN SODIUM AND SULBACTAM SODIUM 200 GRAM(S): 250; 125 INJECTION, POWDER, FOR SUSPENSION INTRAMUSCULAR; INTRAVENOUS at 09:57

## 2022-06-11 NOTE — PROGRESS NOTE ADULT - ASSESSMENT
41 yo M w/ history of HTN, ETOH abuse, seizures brought in by ambulance for seizure with L tongue bite, R facial abrasion, scalp hematoma & was Admitted to medical floor on 5/28 for seizure likely withdrawal seizure, MARTY and thrombocytopenia. His hospital course was - no AEDs as per neurologythdrawal w/ DTs & CIWA 15, requiring transferred to ICU on Precedex drip w/ phenobarbital on 5/30 w/ unasly for aspiration PNA started on 6/5. Pt improved and was transferred to the medical floor on 6/6.     Aspiration PNA  - CXR showed bibasilar infiltrates on 6/5  - patient tolerating room air   - c/w Unasyn  - patient passed swallowing evaluation     Right foot pain  - no obvious cause on exam  - will get X-rays   - c/w PRN ibuprofen        left wrist pain  - no obvious cause on exam  - will get X-rays     ETOH withdrawal w/ Toxic encephalopathy  - resolved  - EEG benign  - c/w MVI, thiamine, folic acid    Seizure  - EEG was normal w/ no seizure focus on 5/29  - no AEDs as per neuro  - will get MRI Brain w/ and wo as per neurology    HTN  - c/w amlodipine & clonidine     thrombocytopenia  - resolved  - likely due to alcohol use    Prophylaxis:  DVT: Lovenox  GI: PO diet    Pt recommended rehab 41 yo M w/ history of HTN, ETOH abuse, seizures brought in by ambulance for seizure with L tongue bite, R facial abrasion, scalp hematoma & was Admitted to medical floor on 5/28 for seizure likely withdrawal seizure, MARTY and thrombocytopenia. His hospital course was - no AEDs as per neurologythdrawal w/ DTs & CIWA 15, requiring transferred to ICU on Precedex drip w/ phenobarbital on 5/30 w/ unasly for aspiration PNA started on 6/5. Pt improved and was transferred to the medical floor on 6/6.     Aspiration PNA  - CXR showed bibasilar infiltrates on 6/5  - patient tolerating room air   - c/w Unasyn  - patient passed swallowing evaluation     Right foot pain  - no obvious cause on exam  - will get X-rays   - c/w PRN ibuprofen        left wrist pain  - no obvious cause on exam  - will get X-rays     ETOH withdrawal w/ Toxic encephalopathy  - resolved  - EEG benign  - c/w MVI, thiamine, folic acid    Seizure  - EEG was normal w/ no seizure focus on 5/29  - no AEDs as per neuro  -MRI cancelled as pt has claustrophobia, declined mri, declined sedation  will need open mri as outpt     HTN  - c/w amlodipine & clonidine     thrombocytopenia  - resolved  - likely due to alcohol use    Prophylaxis:  DVT: Lovenox  GI: PO diet    Pt recommended rehab

## 2022-06-11 NOTE — PROGRESS NOTE ADULT - SUBJECTIVE AND OBJECTIVE BOX
Patient is a 42y old  Male who presents with a chief complaint of Seizure, MARTY (10 Chris 2022 23:24)    INTERVAL HPI/OVERNIGHT EVENTS:no events     MEDICATIONS  (STANDING):  amLODIPine   Tablet 5 milliGRAM(s) Oral daily  ampicillin/sulbactam  IVPB 3 Gram(s) IV Intermittent every 6 hours  ampicillin/sulbactam  IVPB      chlorhexidine 2% Cloths 1 Application(s) Topical daily  cloNIDine 0.2 milliGRAM(s) Oral every 12 hours  enoxaparin Injectable 40 milliGRAM(s) SubCutaneous every 24 hours  folic acid 1 milliGRAM(s) Oral daily  multivitamin 1 Tablet(s) Oral daily  thiamine 100 milliGRAM(s) Oral daily    MEDICATIONS  (PRN):  acetaminophen     Tablet .. 650 milliGRAM(s) Oral every 6 hours PRN Temp greater or equal to 38.5C (101.3F)  ibuprofen  Tablet. 400 milliGRAM(s) Oral every 8 hours PRN Moderate Pain (4 - 6)  LORazepam     Tablet 2 milliGRAM(s) Oral every 2 hours PRN Symptom-triggered 2 point increase in CIWA-Ar  LORazepam   Injectable 2 milliGRAM(s) IV Push every 1 hour PRN Symptom-triggered: each CIWA -Ar score 8 or GREATER    Allergies    No Known Allergies    Intolerances      REVIEW OF SYSTEMS:  All other systems reviewed and are negative    Vital Signs Last 24 Hrs  T(C): 37.4 (2022 05:18), Max: 37.4 (2022 05:18)  T(F): 99.4 (2022 05:18), Max: 99.4 (2022 05:18)  HR: 81 (2022 05:18) (79 - 95)  BP: 151/95 (2022 05:18) (142/83 - 165/99)  BP(mean): --  RR: 18 (2022 05:18) (18 - 18)  SpO2: 96% (2022 05:18) (96% - 97%)  Daily     Daily Weight in k.4 (2022 05:18)  I&O's Summary    10 Chris 2022 07:01  -  2022 07:00  --------------------------------------------------------  IN: 940 mL / OUT: 0 mL / NET: 940 mL      CAPILLARY BLOOD GLUCOSE        PHYSICAL EXAM:  GENERAL: NAD, well-groomed, well-developed  HEAD:  Atraumatic, Normocephalic  EYES: EOMI, PERRLA,   NECK: Supple   NERVOUS SYSTEM:  Alert    CHEST/LUNG: Clear to auscultation bilaterally; No rales, rhonchi, wheezing, or rubs  HEART: Regular rate and rhythm; No murmurs, rubs, or gallops  ABDOMEN: Soft, Nontender, Nondistended; Bowel sounds present  EXTREMITIES: No clubbing, cyanosis, or edema  Labs                                DVT prophylaxis: > Lovenox 40mg SQ daily  > Heparin   > SCD's

## 2022-06-12 PROCEDURE — 99232 SBSQ HOSP IP/OBS MODERATE 35: CPT

## 2022-06-12 RX ADMIN — AMPICILLIN SODIUM AND SULBACTAM SODIUM 200 GRAM(S): 250; 125 INJECTION, POWDER, FOR SUSPENSION INTRAMUSCULAR; INTRAVENOUS at 12:09

## 2022-06-12 RX ADMIN — Medication 1 TABLET(S): at 12:10

## 2022-06-12 RX ADMIN — CHLORHEXIDINE GLUCONATE 1 APPLICATION(S): 213 SOLUTION TOPICAL at 12:10

## 2022-06-12 RX ADMIN — AMPICILLIN SODIUM AND SULBACTAM SODIUM 200 GRAM(S): 250; 125 INJECTION, POWDER, FOR SUSPENSION INTRAMUSCULAR; INTRAVENOUS at 23:45

## 2022-06-12 RX ADMIN — Medication 0.2 MILLIGRAM(S): at 06:11

## 2022-06-12 RX ADMIN — Medication 1 MILLIGRAM(S): at 12:09

## 2022-06-12 RX ADMIN — AMPICILLIN SODIUM AND SULBACTAM SODIUM 200 GRAM(S): 250; 125 INJECTION, POWDER, FOR SUSPENSION INTRAMUSCULAR; INTRAVENOUS at 06:12

## 2022-06-12 RX ADMIN — Medication 0.2 MILLIGRAM(S): at 16:36

## 2022-06-12 RX ADMIN — AMPICILLIN SODIUM AND SULBACTAM SODIUM 200 GRAM(S): 250; 125 INJECTION, POWDER, FOR SUSPENSION INTRAMUSCULAR; INTRAVENOUS at 17:32

## 2022-06-12 RX ADMIN — AMLODIPINE BESYLATE 5 MILLIGRAM(S): 2.5 TABLET ORAL at 06:11

## 2022-06-12 RX ADMIN — ENOXAPARIN SODIUM 40 MILLIGRAM(S): 100 INJECTION SUBCUTANEOUS at 12:10

## 2022-06-12 RX ADMIN — Medication 100 MILLIGRAM(S): at 12:09

## 2022-06-12 NOTE — PROGRESS NOTE ADULT - SUBJECTIVE AND OBJECTIVE BOX
Patient is a 42y old  Male who presents with a chief complaint of Seizure, MARTY (11 Jun 2022 11:18)    INTERVAL HPI/OVERNIGHT EVENTS: no events     MEDICATIONS  (STANDING):  amLODIPine   Tablet 5 milliGRAM(s) Oral daily  ampicillin/sulbactam  IVPB      ampicillin/sulbactam  IVPB 3 Gram(s) IV Intermittent every 6 hours  chlorhexidine 2% Cloths 1 Application(s) Topical daily  cloNIDine 0.2 milliGRAM(s) Oral every 12 hours  enoxaparin Injectable 40 milliGRAM(s) SubCutaneous every 24 hours  folic acid 1 milliGRAM(s) Oral daily  multivitamin 1 Tablet(s) Oral daily  thiamine 100 milliGRAM(s) Oral daily    MEDICATIONS  (PRN):  acetaminophen     Tablet .. 650 milliGRAM(s) Oral every 6 hours PRN Temp greater or equal to 38.5C (101.3F)  ibuprofen  Tablet. 400 milliGRAM(s) Oral every 8 hours PRN Moderate Pain (4 - 6)  LORazepam     Tablet 2 milliGRAM(s) Oral every 2 hours PRN Symptom-triggered 2 point increase in CIWA-Ar  LORazepam   Injectable 2 milliGRAM(s) IV Push every 1 hour PRN Symptom-triggered: each CIWA -Ar score 8 or GREATER    Allergies    No Known Allergies    Intolerances      REVIEW OF SYSTEMS:  All other systems reviewed and are negative    Vital Signs Last 24 Hrs  T(C): 36.6 (12 Jun 2022 05:17), Max: 37.1 (11 Jun 2022 11:50)  T(F): 97.9 (12 Jun 2022 05:17), Max: 98.7 (11 Jun 2022 11:50)  HR: 72 (12 Jun 2022 05:17) (72 - 86)  BP: 145/72 (12 Jun 2022 05:17) (128/81 - 170/106)  BP(mean): --  RR: 20 (12 Jun 2022 05:17) (18 - 20)  SpO2: 96% (12 Jun 2022 05:17) (96% - 98%)  Daily     Daily   I&O's Summary    CAPILLARY BLOOD GLUCOSE        PHYSICAL EXAM:  GENERAL: NAD,    HEAD:  Atraumatic, Normocephalic  EYES: EOMI, PERRLA, conjunctiva and sclera clear  ENMT: No tonsillar erythema, exudates, or enlargement; Moist mucous membranes, Good dentition, No lesions  NECK: Supple, No JVD, Normal thyroid  NERVOUS SYSTEM:  Alert & Oriented X3, Good concentration; Motor Strength 5/5 B/L upper and lower extremities; DTRs 2+ intact and symmetric  CHEST/LUNG: Clear to percussion bilaterally; No rales, rhonchi, wheezing, or rubs  HEART: Regular rate and rhythm; No murmurs, rubs, or gallops  ABDOMEN: Soft, Nontender, Nondistended; Bowel sounds present  EXTREMITIES:  2+ Peripheral Pulses, No clubbing, cyanosis, or edema  LYMPH: No lymphadenopathy noted  SKIN: No rashes or lesions    Labs                                DVT prophylaxis: > Lovenox 40mg SQ daily  > Heparin   > SCD's

## 2022-06-12 NOTE — PROGRESS NOTE ADULT - ASSESSMENT
41 yo M w/ history of HTN, ETOH abuse, seizures brought in by ambulance for seizure with L tongue bite, R facial abrasion, scalp hematoma & was Admitted to medical floor on 5/28 for seizure likely withdrawal seizure, MARTY and thrombocytopenia. His hospital course was - no AEDs as per neurologythdrawal w/ DTs & CIWA 15, requiring transferred to ICU on Precedex drip w/ phenobarbital on 5/30 w/ unasly for aspiration PNA started on 6/5. Pt improved and was transferred to the medical floor on 6/6.     Aspiration PNA  - CXR showed bibasilar infiltrates on 6/5  - patient tolerating room air   - c/w Unasyn LAST DOSE 6/13  - patient passed swallowing evaluation     DC planning to [place]     Right foot pain  - no obvious cause on exam  - will get X-rays   - c/w PRN ibuprofen        left wrist pain  - no obvious cause on exam  -xxray normal, improved     ETOH withdrawal w/ Toxic encephalopathy  - resolved  - EEG benign  - c/w MVI, thiamine, folic acid    Seizure  - EEG was normal w/ no seizure focus on 5/29  - no AEDs as per neuro  -MRI cancelled as pt has claustrophobia, declined mri, declined sedation  will need open mri as outpt     HTN  - c/w amlodipine & clonidine     thrombocytopenia  - resolved  - likely due to alcohol use    Prophylaxis:  DVT: Lovenox  GI: PO diet    Pt recommended rehab

## 2022-06-13 PROCEDURE — 99232 SBSQ HOSP IP/OBS MODERATE 35: CPT

## 2022-06-13 RX ORDER — AMLODIPINE BESYLATE 2.5 MG/1
5 TABLET ORAL ONCE
Refills: 0 | Status: COMPLETED | OUTPATIENT
Start: 2022-06-13 | End: 2022-06-13

## 2022-06-13 RX ADMIN — Medication 1 TABLET(S): at 12:44

## 2022-06-13 RX ADMIN — AMLODIPINE BESYLATE 5 MILLIGRAM(S): 2.5 TABLET ORAL at 00:21

## 2022-06-13 RX ADMIN — ENOXAPARIN SODIUM 40 MILLIGRAM(S): 100 INJECTION SUBCUTANEOUS at 12:44

## 2022-06-13 RX ADMIN — Medication 0.2 MILLIGRAM(S): at 05:45

## 2022-06-13 RX ADMIN — CHLORHEXIDINE GLUCONATE 1 APPLICATION(S): 213 SOLUTION TOPICAL at 13:00

## 2022-06-13 RX ADMIN — AMPICILLIN SODIUM AND SULBACTAM SODIUM 200 GRAM(S): 250; 125 INJECTION, POWDER, FOR SUSPENSION INTRAMUSCULAR; INTRAVENOUS at 05:45

## 2022-06-13 RX ADMIN — Medication 400 MILLIGRAM(S): at 19:30

## 2022-06-13 RX ADMIN — Medication 1 MILLIGRAM(S): at 12:43

## 2022-06-13 RX ADMIN — Medication 0.2 MILLIGRAM(S): at 18:42

## 2022-06-13 RX ADMIN — AMPICILLIN SODIUM AND SULBACTAM SODIUM 200 GRAM(S): 250; 125 INJECTION, POWDER, FOR SUSPENSION INTRAMUSCULAR; INTRAVENOUS at 18:35

## 2022-06-13 RX ADMIN — Medication 400 MILLIGRAM(S): at 18:37

## 2022-06-13 RX ADMIN — AMPICILLIN SODIUM AND SULBACTAM SODIUM 200 GRAM(S): 250; 125 INJECTION, POWDER, FOR SUSPENSION INTRAMUSCULAR; INTRAVENOUS at 12:47

## 2022-06-13 RX ADMIN — Medication 100 MILLIGRAM(S): at 12:44

## 2022-06-13 NOTE — PROGRESS NOTE ADULT - PROVIDER SPECIALTY LIST ADULT
Critical Care
Critical Care
Neurology
Neurology
Critical Care
Hospitalist
Neurology
Critical Care
Critical Care
Hospitalist

## 2022-06-13 NOTE — PROGRESS NOTE ADULT - REASON FOR ADMISSION
Seizure, MARTY

## 2022-06-14 VITALS
TEMPERATURE: 98 F | HEART RATE: 86 BPM | RESPIRATION RATE: 18 BRPM | DIASTOLIC BLOOD PRESSURE: 93 MMHG | OXYGEN SATURATION: 98 % | SYSTOLIC BLOOD PRESSURE: 141 MMHG

## 2022-06-14 PROCEDURE — 99239 HOSP IP/OBS DSCHRG MGMT >30: CPT

## 2022-06-14 RX ORDER — OXYCODONE AND ACETAMINOPHEN 5; 325 MG/1; MG/1
2 TABLET ORAL EVERY 4 HOURS
Refills: 0 | Status: DISCONTINUED | OUTPATIENT
Start: 2022-06-14 | End: 2022-06-14

## 2022-06-14 RX ORDER — IBUPROFEN 200 MG
1 TABLET ORAL
Qty: 90 | Refills: 0
Start: 2022-06-14 | End: 2022-07-13

## 2022-06-14 RX ORDER — AMLODIPINE BESYLATE 2.5 MG/1
1 TABLET ORAL
Qty: 30 | Refills: 0
Start: 2022-06-14 | End: 2022-07-13

## 2022-06-14 RX ADMIN — Medication 0.2 MILLIGRAM(S): at 06:01

## 2022-06-14 RX ADMIN — ENOXAPARIN SODIUM 40 MILLIGRAM(S): 100 INJECTION SUBCUTANEOUS at 11:58

## 2022-06-14 RX ADMIN — Medication 1 MILLIGRAM(S): at 11:58

## 2022-06-14 RX ADMIN — AMPICILLIN SODIUM AND SULBACTAM SODIUM 200 GRAM(S): 250; 125 INJECTION, POWDER, FOR SUSPENSION INTRAMUSCULAR; INTRAVENOUS at 11:57

## 2022-06-14 RX ADMIN — Medication 100 MILLIGRAM(S): at 17:59

## 2022-06-14 RX ADMIN — CHLORHEXIDINE GLUCONATE 1 APPLICATION(S): 213 SOLUTION TOPICAL at 15:16

## 2022-06-14 RX ADMIN — OXYCODONE AND ACETAMINOPHEN 2 TABLET(S): 5; 325 TABLET ORAL at 13:01

## 2022-06-14 RX ADMIN — AMPICILLIN SODIUM AND SULBACTAM SODIUM 200 GRAM(S): 250; 125 INJECTION, POWDER, FOR SUSPENSION INTRAMUSCULAR; INTRAVENOUS at 00:42

## 2022-06-14 RX ADMIN — OXYCODONE AND ACETAMINOPHEN 2 TABLET(S): 5; 325 TABLET ORAL at 12:01

## 2022-06-14 RX ADMIN — Medication 0.2 MILLIGRAM(S): at 17:59

## 2022-06-14 RX ADMIN — AMLODIPINE BESYLATE 5 MILLIGRAM(S): 2.5 TABLET ORAL at 06:02

## 2022-06-14 RX ADMIN — Medication 1 TABLET(S): at 11:58

## 2022-06-14 RX ADMIN — AMPICILLIN SODIUM AND SULBACTAM SODIUM 200 GRAM(S): 250; 125 INJECTION, POWDER, FOR SUSPENSION INTRAMUSCULAR; INTRAVENOUS at 06:01

## 2022-06-14 NOTE — DISCHARGE NOTE NURSING/CASE MANAGEMENT/SOCIAL WORK - NSDCPEFALRISK_GEN_ALL_CORE
For information on Fall & Injury Prevention, visit: [email or web address] OR  [email or web address] OR  https://www.cdc.gov/steadi/patient.html

## 2022-06-14 NOTE — DISCHARGE NOTE NURSING/CASE MANAGEMENT/SOCIAL WORK - PATIENT PORTAL LINK FT
You can access the FollowMyHealth Patient Portal offered by [facility] by registering at the following website: [email or web address]. By joining [employer]’s FollowMyHealth portal, you will also be able to view your health information using other applications (apps) compatible with our system.

## 2022-06-14 NOTE — DISCHARGE NOTE PROVIDER - NSDCCPCAREPLAN_GEN_ALL_CORE_FT
PRINCIPAL DISCHARGE DIAGNOSIS  Diagnosis: Seizure  Assessment and Plan of Treatment: resolved      SECONDARY DISCHARGE DIAGNOSES  Diagnosis: Facial abrasion  Assessment and Plan of Treatment:

## 2022-06-14 NOTE — DISCHARGE NOTE NURSING/CASE MANAGEMENT/SOCIAL WORK - NSDCVIVACCINE_GEN_ALL_CORE_FT
Tdap; 28-May-2022 20:10; Jefferson Dougherty (RN); [employer]; [id number] (Exp. Date: 18-Jan-2024); IntraMuscular; Deltoid Left.; 0.5 milliLiter(s); VIS (VIS Published: 09-May-2013, VIS Presented: 28-May-2022);

## 2022-06-14 NOTE — DISCHARGE NOTE PROVIDER - NSDCMRMEDTOKEN_GEN_ALL_CORE_FT
amLODIPine 5 mg oral tablet: 1 tab(s) orally once a day  cloNIDine 0.2 mg oral tablet: 1 tab(s) orally every 12 hours  ibuprofen 400 mg oral tablet: 1 tab(s) orally every 8 hours, As needed, Moderate Pain (4 - 6)
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
## 2022-06-16 DIAGNOSIS — G40.509 EPILEPTIC SEIZURES RELATED TO EXTERNAL CAUSES, NOT INTRACTABLE, WITHOUT STATUS EPILEPTICUS: ICD-10-CM

## 2022-06-16 DIAGNOSIS — S00.81XA ABRASION OF OTHER PART OF HEAD, INITIAL ENCOUNTER: ICD-10-CM

## 2022-06-16 DIAGNOSIS — N17.9 ACUTE KIDNEY FAILURE, UNSPECIFIED: ICD-10-CM
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
## 2022-06-16 DIAGNOSIS — E86.0 DEHYDRATION: ICD-10-CM

## 2022-06-16 DIAGNOSIS — F10.231 ALCOHOL DEPENDENCE WITH WITHDRAWAL DELIRIUM: ICD-10-CM

## 2022-06-16 DIAGNOSIS — E87.1 HYPO-OSMOLALITY AND HYPONATREMIA: ICD-10-CM

## 2022-06-16 DIAGNOSIS — J69.0 PNEUMONITIS DUE TO INHALATION OF FOOD AND VOMIT: ICD-10-CM

## 2022-06-16 DIAGNOSIS — R74.8 ABNORMAL LEVELS OF OTHER SERUM ENZYMES: ICD-10-CM

## 2022-06-16 DIAGNOSIS — T51.0X1A TOXIC EFFECT OF ETHANOL, ACCIDENTAL (UNINTENTIONAL), INITIAL ENCOUNTER: ICD-10-CM

## 2022-06-16 DIAGNOSIS — I10 ESSENTIAL (PRIMARY) HYPERTENSION: ICD-10-CM

## 2022-06-16 DIAGNOSIS — S00.03XA CONTUSION OF SCALP, INITIAL ENCOUNTER: ICD-10-CM

## 2022-06-16 DIAGNOSIS — Z23 ENCOUNTER FOR IMMUNIZATION: ICD-10-CM

## 2022-06-16 DIAGNOSIS — G31.2 DEGENERATION OF NERVOUS SYSTEM DUE TO ALCOHOL: ICD-10-CM

## 2022-06-16 DIAGNOSIS — M79.671 PAIN IN RIGHT FOOT: ICD-10-CM

## 2022-06-16 DIAGNOSIS — M25.532 PAIN IN LEFT WRIST: ICD-10-CM

## 2022-06-16 DIAGNOSIS — D69.59 OTHER SECONDARY THROMBOCYTOPENIA: ICD-10-CM

## 2023-06-17 ENCOUNTER — INPATIENT (INPATIENT)
Facility: HOSPITAL | Age: 43
LOS: 9 days | Discharge: HOME | End: 2023-06-27
Attending: INTERNAL MEDICINE | Admitting: INTERNAL MEDICINE
Payer: COMMERCIAL

## 2023-06-17 VITALS
HEART RATE: 117 BPM | TEMPERATURE: 98 F | HEIGHT: 67 IN | RESPIRATION RATE: 16 BRPM | SYSTOLIC BLOOD PRESSURE: 138 MMHG | OXYGEN SATURATION: 100 % | WEIGHT: 130.07 LBS | DIASTOLIC BLOOD PRESSURE: 99 MMHG

## 2023-06-17 DIAGNOSIS — F10.939 ALCOHOL USE, UNSPECIFIED WITH WITHDRAWAL, UNSPECIFIED: ICD-10-CM

## 2023-06-17 DIAGNOSIS — I10 ESSENTIAL (PRIMARY) HYPERTENSION: ICD-10-CM

## 2023-06-17 DIAGNOSIS — N17.9 ACUTE KIDNEY FAILURE, UNSPECIFIED: ICD-10-CM

## 2023-06-17 DIAGNOSIS — R56.9 UNSPECIFIED CONVULSIONS: ICD-10-CM

## 2023-06-17 DIAGNOSIS — Z79.899 OTHER LONG TERM (CURRENT) DRUG THERAPY: ICD-10-CM

## 2023-06-17 DIAGNOSIS — M79.89 OTHER SPECIFIED SOFT TISSUE DISORDERS: ICD-10-CM

## 2023-06-17 LAB
ANION GAP SERPL CALC-SCNC: 8 MMOL/L — SIGNIFICANT CHANGE UP (ref 5–17)
APAP SERPL-MCNC: < 2 UG/ML (ref 10–30)
APPEARANCE UR: CLEAR — SIGNIFICANT CHANGE UP
BACTERIA # UR AUTO: ABNORMAL
BASOPHILS # BLD AUTO: 0.02 K/UL — SIGNIFICANT CHANGE UP (ref 0–0.2)
BASOPHILS NFR BLD AUTO: 0.3 % — SIGNIFICANT CHANGE UP (ref 0–2)
BILIRUB UR-MCNC: NEGATIVE — SIGNIFICANT CHANGE UP
BUN SERPL-MCNC: 17 MG/DL — SIGNIFICANT CHANGE UP (ref 7–23)
CALCIUM SERPL-MCNC: 8.9 MG/DL — SIGNIFICANT CHANGE UP (ref 8.5–10.1)
CHLORIDE SERPL-SCNC: 103 MMOL/L — SIGNIFICANT CHANGE UP (ref 96–108)
CO2 SERPL-SCNC: 24 MMOL/L — SIGNIFICANT CHANGE UP (ref 22–31)
COLOR SPEC: YELLOW — SIGNIFICANT CHANGE UP
CREAT SERPL-MCNC: 2.37 MG/DL — HIGH (ref 0.5–1.3)
DIFF PNL FLD: ABNORMAL
EGFR: 34 ML/MIN/1.73M2 — LOW
EOSINOPHIL # BLD AUTO: 0.01 K/UL — SIGNIFICANT CHANGE UP (ref 0–0.5)
EOSINOPHIL NFR BLD AUTO: 0.1 % — SIGNIFICANT CHANGE UP (ref 0–6)
EPI CELLS # UR: SIGNIFICANT CHANGE UP
ETHANOL SERPL-MCNC: <10 MG/DL — SIGNIFICANT CHANGE UP (ref 0–10)
GLUCOSE SERPL-MCNC: 83 MG/DL — SIGNIFICANT CHANGE UP (ref 70–99)
GLUCOSE UR QL: NEGATIVE MG/DL — SIGNIFICANT CHANGE UP
GRAN CASTS # UR COMP ASSIST: ABNORMAL /LPF
HCT VFR BLD CALC: 31.4 % — LOW (ref 39–50)
HGB BLD-MCNC: 11.2 G/DL — LOW (ref 13–17)
IMM GRANULOCYTES NFR BLD AUTO: 0.4 % — SIGNIFICANT CHANGE UP (ref 0–0.9)
KETONES UR-MCNC: ABNORMAL
LEUKOCYTE ESTERASE UR-ACNC: NEGATIVE — SIGNIFICANT CHANGE UP
LYMPHOCYTES # BLD AUTO: 0.37 K/UL — LOW (ref 1–3.3)
LYMPHOCYTES # BLD AUTO: 5.3 % — LOW (ref 13–44)
MCHC RBC-ENTMCNC: 35.1 PG — HIGH (ref 27–34)
MCHC RBC-ENTMCNC: 35.7 G/DL — SIGNIFICANT CHANGE UP (ref 32–36)
MCV RBC AUTO: 98.4 FL — SIGNIFICANT CHANGE UP (ref 80–100)
MONOCYTES # BLD AUTO: 0.67 K/UL — SIGNIFICANT CHANGE UP (ref 0–0.9)
MONOCYTES NFR BLD AUTO: 9.6 % — SIGNIFICANT CHANGE UP (ref 2–14)
NEUTROPHILS # BLD AUTO: 5.85 K/UL — SIGNIFICANT CHANGE UP (ref 1.8–7.4)
NEUTROPHILS NFR BLD AUTO: 84.3 % — HIGH (ref 43–77)
NITRITE UR-MCNC: NEGATIVE — SIGNIFICANT CHANGE UP
NRBC # BLD: 0 /100 WBCS — SIGNIFICANT CHANGE UP (ref 0–0)
PH UR: 6 — SIGNIFICANT CHANGE UP (ref 5–8)
PLATELET # BLD AUTO: 81 K/UL — LOW (ref 150–400)
POTASSIUM SERPL-MCNC: 4 MMOL/L — SIGNIFICANT CHANGE UP (ref 3.5–5.3)
POTASSIUM SERPL-SCNC: 4 MMOL/L — SIGNIFICANT CHANGE UP (ref 3.5–5.3)
PROT UR-MCNC: 100 MG/DL
RBC # BLD: 3.19 M/UL — LOW (ref 4.2–5.8)
RBC # FLD: 14.2 % — SIGNIFICANT CHANGE UP (ref 10.3–14.5)
RBC CASTS # UR COMP ASSIST: ABNORMAL /HPF (ref 0–4)
SALICYLATES SERPL-MCNC: <1.7 MG/DL — LOW (ref 2.8–20)
SODIUM SERPL-SCNC: 135 MMOL/L — SIGNIFICANT CHANGE UP (ref 135–145)
SP GR SPEC: 1.01 — SIGNIFICANT CHANGE UP (ref 1.01–1.02)
UROBILINOGEN FLD QL: NEGATIVE MG/DL — SIGNIFICANT CHANGE UP
WBC # BLD: 6.95 K/UL — SIGNIFICANT CHANGE UP (ref 3.8–10.5)
WBC # FLD AUTO: 6.95 K/UL — SIGNIFICANT CHANGE UP (ref 3.8–10.5)
WBC UR QL: SIGNIFICANT CHANGE UP

## 2023-06-17 PROCEDURE — 99285 EMERGENCY DEPT VISIT HI MDM: CPT

## 2023-06-17 PROCEDURE — 70450 CT HEAD/BRAIN W/O DYE: CPT | Mod: 26,MA

## 2023-06-17 PROCEDURE — 99223 1ST HOSP IP/OBS HIGH 75: CPT

## 2023-06-17 RX ORDER — FOLIC ACID 0.8 MG
1 TABLET ORAL DAILY
Refills: 0 | Status: DISCONTINUED | OUTPATIENT
Start: 2023-06-17 | End: 2023-06-27

## 2023-06-17 RX ORDER — ACETAMINOPHEN 500 MG
650 TABLET ORAL EVERY 6 HOURS
Refills: 0 | Status: DISCONTINUED | OUTPATIENT
Start: 2023-06-17 | End: 2023-06-27

## 2023-06-17 RX ORDER — ACETAMINOPHEN 500 MG
1000 TABLET ORAL ONCE
Refills: 0 | Status: COMPLETED | OUTPATIENT
Start: 2023-06-17 | End: 2023-06-17

## 2023-06-17 RX ORDER — DIAZEPAM 5 MG
5 TABLET ORAL ONCE
Refills: 0 | Status: DISCONTINUED | OUTPATIENT
Start: 2023-06-17 | End: 2023-06-17

## 2023-06-17 RX ORDER — SODIUM CHLORIDE 9 MG/ML
1000 INJECTION, SOLUTION INTRAVENOUS
Refills: 0 | Status: DISCONTINUED | OUTPATIENT
Start: 2023-06-17 | End: 2023-06-21

## 2023-06-17 RX ORDER — ONDANSETRON 8 MG/1
4 TABLET, FILM COATED ORAL EVERY 8 HOURS
Refills: 0 | Status: DISCONTINUED | OUTPATIENT
Start: 2023-06-17 | End: 2023-06-27

## 2023-06-17 RX ORDER — THIAMINE MONONITRATE (VIT B1) 100 MG
500 TABLET ORAL ONCE
Refills: 0 | Status: DISCONTINUED | OUTPATIENT
Start: 2023-06-17 | End: 2023-06-17

## 2023-06-17 RX ORDER — LANOLIN ALCOHOL/MO/W.PET/CERES
3 CREAM (GRAM) TOPICAL AT BEDTIME
Refills: 0 | Status: DISCONTINUED | OUTPATIENT
Start: 2023-06-17 | End: 2023-06-27

## 2023-06-17 RX ORDER — THIAMINE MONONITRATE (VIT B1) 100 MG
100 TABLET ORAL ONCE
Refills: 0 | Status: COMPLETED | OUTPATIENT
Start: 2023-06-17 | End: 2023-06-17

## 2023-06-17 RX ORDER — HEPARIN SODIUM 5000 [USP'U]/ML
5000 INJECTION INTRAVENOUS; SUBCUTANEOUS EVERY 8 HOURS
Refills: 0 | Status: DISCONTINUED | OUTPATIENT
Start: 2023-06-17 | End: 2023-06-19

## 2023-06-17 RX ORDER — THIAMINE MONONITRATE (VIT B1) 100 MG
100 TABLET ORAL DAILY
Refills: 0 | Status: COMPLETED | OUTPATIENT
Start: 2023-06-17 | End: 2023-06-20

## 2023-06-17 RX ADMIN — Medication 100 MILLIGRAM(S): at 14:57

## 2023-06-17 RX ADMIN — Medication 1 MILLIGRAM(S): at 14:58

## 2023-06-17 RX ADMIN — Medication 4 MILLIGRAM(S): at 19:31

## 2023-06-17 RX ADMIN — Medication 5 MILLIGRAM(S): at 15:16

## 2023-06-17 RX ADMIN — HEPARIN SODIUM 5000 UNIT(S): 5000 INJECTION INTRAVENOUS; SUBCUTANEOUS at 22:45

## 2023-06-17 RX ADMIN — Medication 5 MILLIGRAM(S): at 16:38

## 2023-06-17 RX ADMIN — Medication 400 MILLIGRAM(S): at 16:38

## 2023-06-17 RX ADMIN — Medication 4 MILLIGRAM(S): at 22:45

## 2023-06-17 RX ADMIN — Medication 2 MILLIGRAM(S): at 20:44

## 2023-06-17 RX ADMIN — Medication 3 MILLIGRAM(S): at 22:45

## 2023-06-17 RX ADMIN — SODIUM CHLORIDE 100 MILLILITER(S): 9 INJECTION, SOLUTION INTRAVENOUS at 20:44

## 2023-06-17 NOTE — ED ADULT NURSE NOTE - NSFALLHARMRISKINTERV_ED_ALL_ED
Assistance OOB with selected safe patient handling equipment if applicable/Assistance with ambulation/Communicate risk of Fall with Harm to all staff, patient, and family/Monitor gait and stability/Monitor for mental status changes and reorient to person, place, and time, as needed/Move patient closer to nursing station/within visual sight of ED staff/Provide visual cue: red socks, yellow wristband, yellow gown, etc/Reinforce activity limits and safety measures with patient and family/Toileting schedule using arm’s reach rule for commode and bathroom/Use of alarms - bed, stretcher, chair and/or video monitoring/Bed in lowest position, wheels locked, appropriate side rails in place/Call bell, personal items and telephone in reach/Instruct patient to call for assistance before getting out of bed/chair/stretcher/Non-slip footwear applied when patient is off stretcher/[place] to call system/Physically safe environment - no spills, clutter or unnecessary equipment/Purposeful Proactive Rounding/Room/bathroom lighting operational, light cord in reach

## 2023-06-17 NOTE — H&P ADULT - PROBLEM SELECTOR PLAN 4
Chronic unstable  questionable seizure yesterday   [facility] in AM   CIWA protocol as above   Seizure precautions

## 2023-06-17 NOTE — ED ADULT NURSE REASSESSMENT NOTE - NS ED NURSE REASSESS COMMENT FT1
Hand off received by Fawn BRANDON. CO maintained, safety checks performed. pt exhibiting mild auditory and visual hallucinations. Pt knows own name but cannot answer other orientation question. Respirations spontaneous and unlabored.

## 2023-06-17 NOTE — H&P ADULT - NSHPPHYSICALEXAM_GEN_ALL_CORE
PHYSICAL EXAM:  GENERAL: NAD, comfortable at bedside   HEAD:  Atraumatic, Normocephalic  EYES: EOMI, PERRL, conjunctiva and sclera clear  NECK: Supple, No JVD  CHEST/LUNG: Clear to auscultation bilaterally; No wheezes, rales or rhonchi  HEART: Regular rate and rhythm; No murmurs, rubs, or gallops, (+)S1, S2  ABDOMEN: Soft, Nontender, Nondistended; Normal Bowel sounds   EXTREMITIES:  2+ Peripheral Pulses, No clubbing, cyanosis, or edema, swelling of middle finger on right hand   PSYCH: normal mood and affect  NEUROLOGY :moving all 4 extremities spontaneously and appropriately answers questions with reorientation   SKIN: No rashes or lesions

## 2023-06-17 NOTE — ED PROVIDER NOTE - OBJECTIVE STATEMENT
43-year-old male presenting with seizure-like activity.  Patient states he has not had a drink in 4 days and usually becomes sick after drinking.  Patient is tachycardic and having visual visual hallucinations.

## 2023-06-17 NOTE — H&P ADULT - PROBLEM SELECTOR PLAN 1
New  Last alcoholic beverage 4 days ago- presenting with CIWA >10  CIWA with IV ativan taper ordered  LR 100ml/hr

## 2023-06-17 NOTE — H&P ADULT - NSHPLABSRESULTS_GEN_ALL_CORE
labs personally reviewed and pertinent [facility] documents/labs/diagnostics reviewed                         11.2   6.95  )-----------( 81       ( 2023 14:46 )             31.4       -17    135  |  103  |  17  ----------------------------<  83  4.0   |  24  |  2.37<H>    Ca    8.9      2023 14:46    Urinalysis Basic - ( 2023 20:10 )    Color: Yellow / Appearance: Clear / S.015 / pH: x  Gluc: x / Ketone: Moderate  / Bili: Negative / Urobili: Negative mg/dL   Blood: x / Protein: 100 mg/dL / Nitrite: Negative   Leuk Esterase: Negative / RBC: 3-5 /HPF / WBC 0-2   Sq Epi: x / Non Sq Epi: x / Bacteria: Few      CT head interpreted by radiology:  No evidence of acute intracranial injury.. Diffuse brain volume loss greater than typical for age. Patient motion limited scan. Consider repeat evaluation once tolerated by the patient

## 2023-06-17 NOTE — ED ADULT NURSE NOTE - OBJECTIVE STATEMENT
Patient came in alert verbally responsive, noted with seizure like activity upon entering room. Was brought in by EMS  from [facility], patient states he feeling shaky last drink 4 days ago with + tremors, states he fell last night and hit his head.  last seizure last year. + hematoma to right eyebrow. f/s 89. also c/o right middle finger swelling and redness after wood cut his hand. Patient then noted to be hallucinating, eating food that is not there and having convocations with persons that are not here.  Pt also became combative and tried to leave with unsteady gait, MD aware with orders made for pt to be placed on one to one.

## 2023-06-17 NOTE — ED ADULT NURSE NOTE - PAIN: PRESENCE, MLM
2018      XIOMY MENDEZ  [facility] [address] [address]      RE: Shahram Escobar       Dear Colleague,    I had the pleasure of seeing Shahram Escobar in the [facility].    Service Date: 2018      HISTORY OF PRESENT ILLNESS:  I saw Ms. Escobar for followup of atrial fibrillation and hypertension.  Since the last clinic visit about a year ago, she has not had an ER visit or hospitalization.  Symptomatically, she denies palpitation, fatigue or shortness of breath.  She has no chest pain.  She is using a walker for ambulation.      PHYSICAL EXAMINATION:   VITAL SIGNS:  Blood pressure was 156/74, heart rate 56 beats per minute, body weight 160 pounds.   HEENT:  Eyes and ENT were unremarkable.   LUNGS:  Clear.   CARDIAC:  Rhythm was irregularly irregular.  The heart sounds were normal without murmur.   ABDOMEN:  Examination showed no hepatomegaly.   EXTREMITIES:  There was no pedal edema.      RADIOLOGIC STUDIES:  EKG showed atrial fibrillation with slow ventricular rate and right bundle branch block.      ASSESSMENT AND RECOMMENDATIONS:  Ms. Escobar has relatively slow ventricular rate in atrial fibrillation.  I have asked her to stop digoxin and metoprolol because of slow ventricular rate but continue current dose of the diltiazem.      Her blood pressure is elevated and the dose of spironolactone is changed from 12.5 mg to 25 mg once a day.  She may need further adjustment of her blood pressure medications.  Her Cardiology followup is scheduled for 1 year.      cc:   Xiomy Mendez MD    [facility]    [address] [place] [address]         LAKSHMI LATHAM MD             D: 2018   T: 2018   MT: JERRELL      Name:     SHAHRAM ESCOBAR   MRN:      [id number]        Account:      [id number]   :      1930           Service Date: 2018      Document: [id number]           Outpatient Encounter Prescriptions as of 2018    Medication Sig Dispense Refill     acetaminophen (TYLENOL) 325 MG tablet Take 325-650 mg by mouth every 6 hours as needed for mild pain       albuterol (PROAIR HFA, PROVENTIL HFA, VENTOLIN HFA) 108 (90 BASE) MCG/ACT inhaler Inhale 2 puffs into the lungs every 6 hours       dabigatran ANTICOAGULANT (PRADAXA ANTICOAGULANT) 150 MG CAPS capsule Take 1 capsule (150 mg) by mouth 2 times daily 180 capsule 3     diltiazem (TIAZAC) 180 MG 24 hr ER beaded capsule Take 2 capsules (360 mg) by mouth daily Two tabs daily in am 180 capsule 3     diphenhydrAMINE (DIPHENHIST) 25 MG capsule Take 25 mg by mouth every 6 hours as needed for itching or allergies       Hypromellose (ARTIFICIAL TEARS OP) Apply to eye as needed       Inulin (FIBER CHOICE PO)        prazosin (MINIPRESS) 1 MG capsule Take 1 capsule (1 mg) by mouth 2 times daily 180 capsule 3     ramipril (ALTACE) 10 MG capsule Take 1 capsule (10 mg) by mouth 2 times daily 180 capsule 3     Sennosides (SENNA) 8.6 MG CAPS Take 8.6 mg by mouth as needed       sertraline (ZOLOFT) 25 MG tablet Take 25 mg by mouth daily       Simethicone (GAS-X EXTRA STRENGTH PO) Take 80 mg by mouth as needed       spironolactone (ALDACTONE) 25 MG tablet Take 1 tablet (25 mg) by mouth daily 90 tablet 3     Fexofenadine HCl (ALLEGRA PO) Take 180 mg by mouth daily       [DISCONTINUED] digoxin (LANOXIN) 125 MCG tablet Take 1 tablet (125 mcg) by mouth daily 90 tablet 3     [DISCONTINUED] metoprolol (TOPROL-XL) 25 MG 24 hr tablet Take 1 tablet (25 mg) by mouth daily 90 tablet 3     [DISCONTINUED] spironolactone (ALDACTONE) 25 MG tablet 25 mg by Oral or Feeding Tube route daily Take 1/2 tablet (12.5 MG) daily 30 tablet      No facility-administered encounter medications on file as of 7/17/2018.        Again, thank you for allowing me to participate in the care of your patient.      Sincerely,    Jaden Augustine MD     [facility]     denies pain/discomfort (Rating = 0)

## 2023-06-17 NOTE — ED PROVIDER NOTE - PHYSICAL EXAMINATION
[place]:  General: No distress.  agitated.  oriented x4  HEENT: WNL  Chest/Lungs: CTAB, No wheeze, No retractions, No increased work of breathing, Normal rate  Heart: S1S2 RRR, No M/R/G, Pules equal Bilaterally in upper and lower extremities distally  Abd: soft, NT/ND, No guarding, No rebound.  No hernias, no palpable masses.  Extrem: FROM in all joints, no significant edema noted, No ulcers.  Cap refil < 2sec.  Skin: No rash noted, warm dry.  Neuro:  Grossly normal.  No difficulty ambulating. No focal deficits. Tremulous  Psychiatric: No evidence of delusions. No SI/HI.

## 2023-06-17 NOTE — PATIENT PROFILE ADULT - FALL HARM RISK - HARM RISK INTERVENTIONS
Assistance with ambulation/Assistance OOB with selected safe patient handling equipment/Communicate Risk of Fall with Harm to all staff/Discuss with provider need for PT consult/Monitor for mental status changes/Monitor gait and stability/Provide patient with walking aids - walker, cane, crutches/Reinforce activity limits and safety measures with patient and family/Tailored Fall Risk Interventions/Toileting schedule using arm’s reach rule for commode and bathroom/Use of alarms - bed, chair and/or voice tab/Visual Cue: Yellow wristband and red socks/Bed in lowest position, wheels locked, appropriate side rails in place/Call bell, personal items and telephone in reach/Instruct patient to call for assistance before getting out of bed or chair/Non-slip footwear when patient is out of bed/[place] to call system/Physically safe environment - no spills, clutter or unnecessary equipment/Purposeful Proactive Rounding/Room/bathroom lighting operational, light cord in reach

## 2023-06-17 NOTE — ED ADULT NURSE NOTE - CHIEF COMPLAINT QUOTE
eva from [facility], patient states he feeling shaky last drink 4 days ago. + tremors in triage. states he fell last night and hit his head.  last seizure last year. + hematoma to right eyebrow. f/s 89. also c/o right middle finger swelling and redness after wood cut his hand.

## 2023-06-17 NOTE — H&P ADULT - TIME BILLING
I have spent a total of 96 minutes  to prepare to see the patient, obtaining and reviewing history, physical examination, explaining the diagnosis, prognosis and treatment plan with the patient/family/caregiver. I also have spent the time ordering studies and testing, interpreting results, medicine reconciliation, subspecialty consultation and documentation as above.

## 2023-06-17 NOTE — ED ADULT TRIAGE NOTE - CHIEF COMPLAINT QUOTE
eva from [facility], patient states he feeling shaky last drink 4 days ago. + tremors in triage. states he fell last night and hit his head.  last seizure last year. + hematoma to right eyebrow. f/s 89. also c/o right middle finger swelling and redness after wood cut his hand.

## 2023-06-17 NOTE — H&P ADULT - NSHPREVIEWOFSYSTEMS_GEN_ALL_CORE
REVIEW OF SYSTEMS:    CONSTITUTIONAL: No weakness, fevers or chills + fall  EYES/ENT: No visual changes;  No dysphagia; No sore throat; No rhinorrhea; No sinus pain/pressure  NECK: No pain or stiffness  RESPIRATORY: No cough, wheezing, hemoptysis; No shortness of breath  CARDIOVASCULAR: No chest pain or palpitations; No lower extremity edema  GASTROINTESTINAL: No abdominal or epigastric pain. No nausea, vomiting, or hematemesis; No diarrhea or constipation. No melena or hematochezia.  GENITOURINARY: No dysuria, frequency or hematuria  NEUROLOGICAL: No numbness or weakness + tremors   MSK: ambulates without assistance   SKIN: No itching, burning, rashes, or lesions   All other review of systems is negative unless indicated above.

## 2023-06-17 NOTE — H&P ADULT - HISTORY OF PRESENT ILLNESS
43 yr old male with a pmh of alcohol dependence recently presents per ED with tremors with his last drink being 4 days ago. Per triage note pt reports a fall last night with head injury. 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   History partially limited as pt received benzo's prior to questioning and requires redirection frequently.  Denies  headache, dizziness, chest pain, palpitations, SOB, abdominal pain, joint pain, diarrhea/constipation, urinary symptoms.   Vitals: T 99.3, , /99, RR 16 satting 100% RA

## 2023-06-17 NOTE — PATIENT PROFILE ADULT - FUNCTIONAL ASSESSMENT - BASIC MOBILITY 6.
1-calculated by average/Not able to assess (calculate score using [facility] averaging method)

## 2023-06-17 NOTE — H&P ADULT - NSICDXFAMILYHX_GEN_ALL_CORE_FT
FAMILY HISTORY:  No pertinent family history in first degree relatives     Dutasteride Pregnancy And Lactation Text: This medication is absolutely contraindicated in women, especially during pregnancy and breast feeding. Feminization of male fetuses is possible if taking while pregnant.

## 2023-06-18 LAB
ANION GAP SERPL CALC-SCNC: 17 MMOL/L — SIGNIFICANT CHANGE UP (ref 5–17)
BASOPHILS # BLD AUTO: 0.03 K/UL — SIGNIFICANT CHANGE UP (ref 0–0.2)
BASOPHILS NFR BLD AUTO: 0.6 % — SIGNIFICANT CHANGE UP (ref 0–2)
BUN SERPL-MCNC: 15 MG/DL — SIGNIFICANT CHANGE UP (ref 7–23)
CALCIUM SERPL-MCNC: 8.6 MG/DL — SIGNIFICANT CHANGE UP (ref 8.5–10.1)
CHLORIDE SERPL-SCNC: 103 MMOL/L — SIGNIFICANT CHANGE UP (ref 96–108)
CO2 SERPL-SCNC: 18 MMOL/L — LOW (ref 22–31)
CREAT SERPL-MCNC: 1.18 MG/DL — SIGNIFICANT CHANGE UP (ref 0.5–1.3)
EGFR: 80 ML/MIN/1.73M2 — SIGNIFICANT CHANGE UP
EOSINOPHIL # BLD AUTO: 0.05 K/UL — SIGNIFICANT CHANGE UP (ref 0–0.5)
EOSINOPHIL NFR BLD AUTO: 0.9 % — SIGNIFICANT CHANGE UP (ref 0–6)
FERRITIN SERPL-MCNC: 642 NG/ML — HIGH (ref 30–400)
FOLATE SERPL-MCNC: 7.9 NG/ML — SIGNIFICANT CHANGE UP
GLUCOSE SERPL-MCNC: 63 MG/DL — LOW (ref 70–99)
HCT VFR BLD CALC: 30.3 % — LOW (ref 39–50)
HGB BLD-MCNC: 10.3 G/DL — LOW (ref 13–17)
IMM GRANULOCYTES NFR BLD AUTO: 0.4 % — SIGNIFICANT CHANGE UP (ref 0–0.9)
IRON SATN MFR SERPL: 15 % — LOW (ref 16–55)
IRON SATN MFR SERPL: 39 UG/DL — LOW (ref 45–165)
LYMPHOCYTES # BLD AUTO: 0.58 K/UL — LOW (ref 1–3.3)
LYMPHOCYTES # BLD AUTO: 10.9 % — LOW (ref 13–44)
MAGNESIUM SERPL-MCNC: 1.8 MG/DL — SIGNIFICANT CHANGE UP (ref 1.6–2.6)
MCHC RBC-ENTMCNC: 34 G/DL — SIGNIFICANT CHANGE UP (ref 32–36)
MCHC RBC-ENTMCNC: 34.7 PG — HIGH (ref 27–34)
MCV RBC AUTO: 102 FL — HIGH (ref 80–100)
MONOCYTES # BLD AUTO: 0.66 K/UL — SIGNIFICANT CHANGE UP (ref 0–0.9)
MONOCYTES NFR BLD AUTO: 12.5 % — SIGNIFICANT CHANGE UP (ref 2–14)
NEUTROPHILS # BLD AUTO: 3.96 K/UL — SIGNIFICANT CHANGE UP (ref 1.8–7.4)
NEUTROPHILS NFR BLD AUTO: 74.7 % — SIGNIFICANT CHANGE UP (ref 43–77)
NRBC # BLD: 0 /100 WBCS — SIGNIFICANT CHANGE UP (ref 0–0)
PHOSPHATE SERPL-MCNC: 3.9 MG/DL — SIGNIFICANT CHANGE UP (ref 2.5–4.5)
PLATELET # BLD AUTO: 81 K/UL — LOW (ref 150–400)
POTASSIUM SERPL-MCNC: 3.5 MMOL/L — SIGNIFICANT CHANGE UP (ref 3.5–5.3)
POTASSIUM SERPL-SCNC: 3.5 MMOL/L — SIGNIFICANT CHANGE UP (ref 3.5–5.3)
RBC # BLD: 2.97 M/UL — LOW (ref 4.2–5.8)
RBC # FLD: 14.3 % — SIGNIFICANT CHANGE UP (ref 10.3–14.5)
SODIUM SERPL-SCNC: 138 MMOL/L — SIGNIFICANT CHANGE UP (ref 135–145)
TIBC SERPL-MCNC: 252 UG/DL — SIGNIFICANT CHANGE UP (ref 220–430)
UIBC SERPL-MCNC: 213 UG/DL — SIGNIFICANT CHANGE UP (ref 110–370)
VIT B12 SERPL-MCNC: 756 PG/ML — SIGNIFICANT CHANGE UP (ref 232–1245)
WBC # BLD: 5.3 K/UL — SIGNIFICANT CHANGE UP (ref 3.8–10.5)
WBC # FLD AUTO: 5.3 K/UL — SIGNIFICANT CHANGE UP (ref 3.8–10.5)

## 2023-06-18 PROCEDURE — 99232 SBSQ HOSP IP/OBS MODERATE 35: CPT

## 2023-06-18 RX ADMIN — Medication 3 MILLIGRAM(S): at 17:39

## 2023-06-18 RX ADMIN — HEPARIN SODIUM 5000 UNIT(S): 5000 INJECTION INTRAVENOUS; SUBCUTANEOUS at 23:30

## 2023-06-18 RX ADMIN — Medication 4 MILLIGRAM(S): at 06:21

## 2023-06-18 RX ADMIN — Medication 4 MILLIGRAM(S): at 02:31

## 2023-06-18 RX ADMIN — Medication 2 MILLIGRAM(S): at 05:07

## 2023-06-18 RX ADMIN — HEPARIN SODIUM 5000 UNIT(S): 5000 INJECTION INTRAVENOUS; SUBCUTANEOUS at 05:07

## 2023-06-18 RX ADMIN — Medication 1 MILLIGRAM(S): at 11:08

## 2023-06-18 RX ADMIN — Medication 100 MILLIGRAM(S): at 11:07

## 2023-06-18 RX ADMIN — Medication 4 MILLIGRAM(S): at 10:40

## 2023-06-18 RX ADMIN — Medication 4 MILLIGRAM(S): at 14:21

## 2023-06-18 RX ADMIN — Medication 3 MILLIGRAM(S): at 23:29

## 2023-06-18 RX ADMIN — Medication 2 MILLIGRAM(S): at 01:29

## 2023-06-18 RX ADMIN — HEPARIN SODIUM 5000 UNIT(S): 5000 INJECTION INTRAVENOUS; SUBCUTANEOUS at 14:21

## 2023-06-18 RX ADMIN — Medication 2 MILLIGRAM(S): at 00:22

## 2023-06-18 RX ADMIN — Medication 1 TABLET(S): at 11:07

## 2023-06-18 RX ADMIN — SODIUM CHLORIDE 100 MILLILITER(S): 9 INJECTION, SOLUTION INTRAVENOUS at 14:17

## 2023-06-18 NOTE — PROGRESS NOTE ADULT - SUBJECTIVE AND OBJECTIVE BOX
CC: Patient is a 43y old  Male who presents with a chief complaint of alcohol withdrawal, MARTY (17 Jun 2023 20:57)      Patient seen and examined at bedside, No acute overnight events. Pt denies any complaints but appears confused    ROS: Denies fever, nausea, vomiting, chest pain, SOB, abdominal pain, diarrhea and constipation    Vital Sign  Vital Signs Last 24 Hrs  T(C): 36.4 (18 Jun 2023 10:02), Max: 37.4 (17 Jun 2023 16:24)  T(F): 97.5 (18 Jun 2023 10:02), Max: 99.3 (17 Jun 2023 16:24)  HR: 88 (18 Jun 2023 10:02) (76 - 118)  BP: 164/92 (18 Jun 2023 10:02) (138/99 - 164/116)  BP(mean): --  RR: 18 (18 Jun 2023 10:02) (16 - 20)  SpO2: 100% (18 Jun 2023 10:02) (97% - 100%)    Parameters below as of 18 Jun 2023 10:02  Patient On (Oxygen Delivery Method): room air        Physical Exam:   Gen: NAD  HEENT: NCAT, EOMI, PERRLA, Pupils_  CVS: RRR, +S1/S2, no murmurs, rubs or gallops appreciated  Lungs: CTAB, no wheeze, rales, rhonchi  Abdomen: +BS, soft, ND, NT. no palpable flank tenderness or mass, no CVA tenderness  Ext: No cyanosis, edema or calf tenderness  Neuro: AAOx1(self), no focal deficits.    Labs:                        10.3   5.30  )-----------( 81       ( 18 Jun 2023 07:10 )             30.3   06-18    138  |  103  |  15  ----------------------------<  63<L>  3.5   |  18<L>  |  1.18    Ca    8.6      18 Jun 2023 07:10  Phos  3.9     06-18  Mg     1.8     06-18        06-17 @ 07:01  -  06-18 @ 07:00  --------------------------------------------------------  IN: 1100 mL / OUT: 0 mL / NET: 1100 mL        Radiology:  *Pull    Medications:  MEDICATIONS  (STANDING):  folic acid 1 milliGRAM(s) Oral daily  heparin   Injectable 5000 Unit(s) SubCutaneous every 8 hours  lactated ringers. 1000 milliLiter(s) (100 mL/Hr) IV Continuous <Continuous>  LORazepam   Injectable 4 milliGRAM(s) IV Push every 4 hours  LORazepam   Injectable 3 milliGRAM(s) IV Push every 4 hours  LORazepam   Injectable   IV Push   multivitamin 1 Tablet(s) Oral daily  thiamine 100 milliGRAM(s) Oral daily    MEDICATIONS  (PRN):  acetaminophen     Tablet .. 650 milliGRAM(s) Oral every 6 hours PRN Temp greater or equal to 38C (100.4F), Mild Pain (1 - 3)  aluminum hydroxide/magnesium hydroxide/simethicone Suspension 30 milliLiter(s) Oral every 4 hours PRN Dyspepsia  LORazepam   Injectable 2 milliGRAM(s) IV Push every 1 hour PRN Symptom-triggered: each CIWA -Ar score 8 or GREATER  melatonin 3 milliGRAM(s) Oral at bedtime PRN Insomnia  ondansetron Injectable 4 milliGRAM(s) IV Push every 8 hours PRN Nausea and/or Vomiting

## 2023-06-19 LAB
AMPHET UR-MCNC: NEGATIVE — SIGNIFICANT CHANGE UP
ANION GAP SERPL CALC-SCNC: 10 MMOL/L — SIGNIFICANT CHANGE UP (ref 5–17)
BARBITURATES UR SCN-MCNC: NEGATIVE — SIGNIFICANT CHANGE UP
BASOPHILS # BLD AUTO: 0.01 K/UL — SIGNIFICANT CHANGE UP (ref 0–0.2)
BASOPHILS NFR BLD AUTO: 0.2 % — SIGNIFICANT CHANGE UP (ref 0–2)
BENZODIAZ UR-MCNC: POSITIVE — SIGNIFICANT CHANGE UP
BUN SERPL-MCNC: 8 MG/DL — SIGNIFICANT CHANGE UP (ref 7–23)
CALCIUM SERPL-MCNC: 8.9 MG/DL — SIGNIFICANT CHANGE UP (ref 8.5–10.1)
CHLORIDE SERPL-SCNC: 102 MMOL/L — SIGNIFICANT CHANGE UP (ref 96–108)
CO2 SERPL-SCNC: 22 MMOL/L — SIGNIFICANT CHANGE UP (ref 22–31)
COCAINE METAB.OTHER UR-MCNC: NEGATIVE — SIGNIFICANT CHANGE UP
CREAT ?TM UR-MCNC: 104 MG/DL — SIGNIFICANT CHANGE UP
CREAT SERPL-MCNC: 0.96 MG/DL — SIGNIFICANT CHANGE UP (ref 0.5–1.3)
EGFR: 102 ML/MIN/1.73M2 — SIGNIFICANT CHANGE UP
EOSINOPHIL # BLD AUTO: 0.01 K/UL — SIGNIFICANT CHANGE UP (ref 0–0.5)
EOSINOPHIL NFR BLD AUTO: 0.2 % — SIGNIFICANT CHANGE UP (ref 0–6)
GLUCOSE BLDC GLUCOMTR-MCNC: 98 MG/DL — SIGNIFICANT CHANGE UP (ref 70–99)
GLUCOSE SERPL-MCNC: 107 MG/DL — HIGH (ref 70–99)
HCT VFR BLD CALC: 27 % — LOW (ref 39–50)
HGB BLD-MCNC: 9.6 G/DL — LOW (ref 13–17)
IMM GRANULOCYTES NFR BLD AUTO: 0.4 % — SIGNIFICANT CHANGE UP (ref 0–0.9)
LYMPHOCYTES # BLD AUTO: 0.41 K/UL — LOW (ref 1–3.3)
LYMPHOCYTES # BLD AUTO: 7.7 % — LOW (ref 13–44)
MAGNESIUM SERPL-MCNC: 1.3 MG/DL — LOW (ref 1.6–2.6)
MCHC RBC-ENTMCNC: 34.8 PG — HIGH (ref 27–34)
MCHC RBC-ENTMCNC: 35.6 G/DL — SIGNIFICANT CHANGE UP (ref 32–36)
MCV RBC AUTO: 97.8 FL — SIGNIFICANT CHANGE UP (ref 80–100)
METHADONE UR-MCNC: NEGATIVE — SIGNIFICANT CHANGE UP
MONOCYTES # BLD AUTO: 0.88 K/UL — SIGNIFICANT CHANGE UP (ref 0–0.9)
MONOCYTES NFR BLD AUTO: 16.4 % — HIGH (ref 2–14)
NEUTROPHILS # BLD AUTO: 4.02 K/UL — SIGNIFICANT CHANGE UP (ref 1.8–7.4)
NEUTROPHILS NFR BLD AUTO: 75.1 % — SIGNIFICANT CHANGE UP (ref 43–77)
NRBC # BLD: 0 /100 WBCS — SIGNIFICANT CHANGE UP (ref 0–0)
OPIATES UR-MCNC: NEGATIVE — SIGNIFICANT CHANGE UP
PCP SPEC-MCNC: SIGNIFICANT CHANGE UP
PCP UR-MCNC: NEGATIVE — SIGNIFICANT CHANGE UP
PHOSPHATE SERPL-MCNC: 2.4 MG/DL — LOW (ref 2.5–4.5)
PLATELET # BLD AUTO: 93 K/UL — LOW (ref 150–400)
POTASSIUM SERPL-MCNC: 3.5 MMOL/L — SIGNIFICANT CHANGE UP (ref 3.5–5.3)
POTASSIUM SERPL-SCNC: 3.5 MMOL/L — SIGNIFICANT CHANGE UP (ref 3.5–5.3)
RAPID RVP RESULT: SIGNIFICANT CHANGE UP
RBC # BLD: 2.76 M/UL — LOW (ref 4.2–5.8)
RBC # FLD: 13.9 % — SIGNIFICANT CHANGE UP (ref 10.3–14.5)
SARS-COV-2 RNA SPEC QL NAA+PROBE: SIGNIFICANT CHANGE UP
SODIUM SERPL-SCNC: 134 MMOL/L — LOW (ref 135–145)
SODIUM UR-SCNC: 65 MMOL/L — SIGNIFICANT CHANGE UP
THC UR QL: NEGATIVE — SIGNIFICANT CHANGE UP
WBC # BLD: 5.35 K/UL — SIGNIFICANT CHANGE UP (ref 3.8–10.5)
WBC # FLD AUTO: 5.35 K/UL — SIGNIFICANT CHANGE UP (ref 3.8–10.5)

## 2023-06-19 PROCEDURE — 99233 SBSQ HOSP IP/OBS HIGH 50: CPT

## 2023-06-19 RX ORDER — MAGNESIUM SULFATE 500 MG/ML
2 VIAL (ML) INJECTION ONCE
Refills: 0 | Status: COMPLETED | OUTPATIENT
Start: 2023-06-19 | End: 2023-06-19

## 2023-06-19 RX ORDER — HEPARIN SODIUM 5000 [USP'U]/ML
5000 INJECTION INTRAVENOUS; SUBCUTANEOUS ONCE
Refills: 0 | Status: COMPLETED | OUTPATIENT
Start: 2023-06-19 | End: 2023-06-19

## 2023-06-19 RX ORDER — POTASSIUM PHOSPHATE, MONOBASIC POTASSIUM PHOSPHATE, DIBASIC 236; 224 MG/ML; MG/ML
30 INJECTION, SOLUTION INTRAVENOUS ONCE
Refills: 0 | Status: COMPLETED | OUTPATIENT
Start: 2023-06-19 | End: 2023-06-19

## 2023-06-19 RX ORDER — ENOXAPARIN SODIUM 100 MG/ML
40 INJECTION SUBCUTANEOUS EVERY 24 HOURS
Refills: 0 | Status: DISCONTINUED | OUTPATIENT
Start: 2023-06-20 | End: 2023-06-27

## 2023-06-19 RX ORDER — ACETAMINOPHEN 500 MG
1000 TABLET ORAL ONCE
Refills: 0 | Status: DISCONTINUED | OUTPATIENT
Start: 2023-06-19 | End: 2023-06-20

## 2023-06-19 RX ORDER — LABETALOL HCL 100 MG
5 TABLET ORAL ONCE
Refills: 0 | Status: COMPLETED | OUTPATIENT
Start: 2023-06-19 | End: 2023-06-19

## 2023-06-19 RX ORDER — LABETALOL HCL 100 MG
10 TABLET ORAL ONCE
Refills: 0 | Status: COMPLETED | OUTPATIENT
Start: 2023-06-19 | End: 2023-06-19

## 2023-06-19 RX ADMIN — Medication 100 MILLIGRAM(S): at 12:45

## 2023-06-19 RX ADMIN — Medication 650 MILLIGRAM(S): at 12:44

## 2023-06-19 RX ADMIN — POTASSIUM PHOSPHATE, MONOBASIC POTASSIUM PHOSPHATE, DIBASIC 83.33 MILLIMOLE(S): 236; 224 INJECTION, SOLUTION INTRAVENOUS at 14:29

## 2023-06-19 RX ADMIN — Medication 650 MILLIGRAM(S): at 21:54

## 2023-06-19 RX ADMIN — SODIUM CHLORIDE 100 MILLILITER(S): 9 INJECTION, SOLUTION INTRAVENOUS at 00:03

## 2023-06-19 RX ADMIN — Medication 25 GRAM(S): at 13:10

## 2023-06-19 RX ADMIN — Medication 0.2 MILLIGRAM(S): at 14:30

## 2023-06-19 RX ADMIN — Medication 1 TABLET(S): at 12:45

## 2023-06-19 RX ADMIN — HEPARIN SODIUM 5000 UNIT(S): 5000 INJECTION INTRAVENOUS; SUBCUTANEOUS at 21:40

## 2023-06-19 RX ADMIN — Medication 1 MILLIGRAM(S): at 12:45

## 2023-06-19 RX ADMIN — SODIUM CHLORIDE 100 MILLILITER(S): 9 INJECTION, SOLUTION INTRAVENOUS at 21:41

## 2023-06-19 RX ADMIN — Medication 10 MILLIGRAM(S): at 05:45

## 2023-06-19 RX ADMIN — Medication 3 MILLIGRAM(S): at 06:43

## 2023-06-19 RX ADMIN — Medication 650 MILLIGRAM(S): at 13:45

## 2023-06-19 RX ADMIN — Medication 3 MILLIGRAM(S): at 11:07

## 2023-06-19 RX ADMIN — Medication 2 MILLIGRAM(S): at 21:40

## 2023-06-19 RX ADMIN — Medication 2 MILLIGRAM(S): at 18:45

## 2023-06-19 RX ADMIN — Medication 2 MILLIGRAM(S): at 05:33

## 2023-06-19 RX ADMIN — HEPARIN SODIUM 5000 UNIT(S): 5000 INJECTION INTRAVENOUS; SUBCUTANEOUS at 06:45

## 2023-06-19 RX ADMIN — Medication 3 MILLIGRAM(S): at 14:30

## 2023-06-19 RX ADMIN — HEPARIN SODIUM 5000 UNIT(S): 5000 INJECTION INTRAVENOUS; SUBCUTANEOUS at 14:30

## 2023-06-19 RX ADMIN — Medication 2 MILLIGRAM(S): at 04:26

## 2023-06-19 RX ADMIN — Medication 2 MILLIGRAM(S): at 02:14

## 2023-06-19 RX ADMIN — Medication 0.2 MILLIGRAM(S): at 21:42

## 2023-06-19 RX ADMIN — SODIUM CHLORIDE 100 MILLILITER(S): 9 INJECTION, SOLUTION INTRAVENOUS at 19:49

## 2023-06-19 RX ADMIN — Medication 0.2 MILLIGRAM(S): at 05:50

## 2023-06-19 RX ADMIN — Medication 650 MILLIGRAM(S): at 23:35

## 2023-06-19 RX ADMIN — Medication 5 MILLIGRAM(S): at 08:46

## 2023-06-19 NOTE — DIETITIAN INITIAL EVALUATION ADULT - PERTINENT MEDS FT
MEDICATIONS  (STANDING):  cloNIDine 0.2 milliGRAM(s) Oral three times a day  folic acid 1 milliGRAM(s) Oral daily  heparin   Injectable 5000 Unit(s) SubCutaneous every 8 hours  lactated ringers. 1000 milliLiter(s) (100 mL/Hr) IV Continuous <Continuous>  LORazepam   Injectable 3 milliGRAM(s) IV Push every 4 hours  LORazepam   Injectable 2 milliGRAM(s) IV Push every 4 hours  LORazepam   Injectable   IV Push   multivitamin 1 Tablet(s) Oral daily  potassium phosphate IVPB 30 milliMole(s) IV Intermittent once  thiamine 100 milliGRAM(s) Oral daily    MEDICATIONS  (PRN):  acetaminophen     Tablet .. 650 milliGRAM(s) Oral every 6 hours PRN Temp greater or equal to 38C (100.4F), Mild Pain (1 - 3)  acetaminophen   IVPB .. 1000 milliGRAM(s) IV Intermittent once PRN Temp greater or equal to 38C (100.4F)  aluminum hydroxide/magnesium hydroxide/simethicone Suspension 30 milliLiter(s) Oral every 4 hours PRN Dyspepsia  LORazepam   Injectable 2 milliGRAM(s) IV Push every 1 hour PRN Symptom-triggered: each CIWA -Ar score 8 or GREATER  melatonin 3 milliGRAM(s) Oral at bedtime PRN Insomnia  ondansetron Injectable 4 milliGRAM(s) IV Push every 8 hours PRN Nausea and/or Vomiting

## 2023-06-19 NOTE — DIETITIAN INITIAL EVALUATION ADULT - REASON
Pt confused, restless admitted for ETOH withdrawal noted; pt with visible mild temporal wasting and mild orbital depletion observed

## 2023-06-19 NOTE — RAPID RESPONSE TEAM SUMMARY - NSSITUATIONBACKGROUNDRRT_GEN_ALL_CORE
43M h/o HTN admitted with s/sx of etoh w/d and burke.   RRT called by RN for asymptomatic hypertension.   T(C): , Max: 36.8 (06-18-23 @ 23:16)  HR: 79 (06-19-23 @ 04:29) (79 - 108)  BP: 171/102 (06-19-23 @ 04:29) (162/81 - 180/123)  RR: 18 (06-18-23 @ 23:16) (17 - 18)  SpO2: 100% (06-18-23 @ 17:09) (100% - 100%)

## 2023-06-19 NOTE — DIETITIAN INITIAL EVALUATION ADULT - ADD RECOMMEND
Monitor and replete electrolytes as needed  Continue to provide encouragement and assistance with PO intake

## 2023-06-19 NOTE — PROGRESS NOTE ADULT - SUBJECTIVE AND OBJECTIVE BOX
Patient is a 43y old  Male who presents with a chief complaint of ACUTE KIDNEY INJURY, ALCOHOL WITHDRAWAL     (19 Jun 2023 13:54)      INTERVAL HPI/OVERNIGHT EVENTS:  Pt is sleelping s/p ativan.      REVIEW OF SYSTEMS:  cannot give ROS      FAMILY HISTORY:  No pertinent family history in first degree relatives      Vital Signs Last 24 Hrs  T(C): 36.5 (19 Jun 2023 16:05), Max: 38.4 (19 Jun 2023 11:45)  T(F): 97.7 (19 Jun 2023 16:05), Max: 101.2 (19 Jun 2023 11:45)  HR: 104 (19 Jun 2023 16:05) (79 - 111)  BP: 114/85 (19 Jun 2023 16:05) (114/85 - 180/123)  BP(mean): --  RR: 18 (19 Jun 2023 16:05) (17 - 18)  SpO2: 98% (19 Jun 2023 16:05) (98% - 100%)    Parameters below as of 19 Jun 2023 16:05  Patient On (Oxygen Delivery Method): room air        06-18-23 @ 07:01  -  06-19-23 @ 07:00  --------------------------------------------------------  IN: 1200 mL / OUT: 500 mL / NET: 700 mL        PHYSICAL EXAM:  GENERAL: NAD, well-groomed, well-developed  HEAD:  Atraumatic, Normocephalic  EYES: EOMI, PERRLA, conjunctiva and sclera clear  ENMT: No tonsillar erythema, exudates, or enlargement; Moist mucous membranes  NECK: Supple, No JVD, Normal thyroid  NERVOUS SYSTEM:  Sleeping Moving all extremities  CHEST/LUNG:  No rales, rhonchi, wheezing, or rubs  HEART: Regular rate and rhythm; No murmurs, rubs, or gallops  ABDOMEN: Soft, Nontender, Nondistended; Bowel sounds present  EXTREMITIES:  2+ Peripheral Pulses, No clubbing, cyanosis, or edema  LYMPH: No lymphadenopathy noted  SKIN: No rashes or lesions    Consultant(s) Notes Reviewed:  [x ] YES  [ ] NO  Care Discussed with Consultants/Other Providers [ x] YES  [ ] NO    LABS:        RADIOLOGY & ADDITIONAL TESTS:    Imaging Personally Reviewed:  [ ] YES  [ ] NO  acetaminophen     Tablet .. 650 milliGRAM(s) Oral every 6 hours PRN  acetaminophen   IVPB .. 1000 milliGRAM(s) IV Intermittent once PRN  aluminum hydroxide/magnesium hydroxide/simethicone Suspension 30 milliLiter(s) Oral every 4 hours PRN  cloNIDine 0.2 milliGRAM(s) Oral three times a day  folic acid 1 milliGRAM(s) Oral daily  heparin   Injectable 5000 Unit(s) SubCutaneous every 8 hours  lactated ringers. 1000 milliLiter(s) IV Continuous <Continuous>  LORazepam   Injectable 2 milliGRAM(s) IV Push every 4 hours  LORazepam   Injectable   IV Push   LORazepam   Injectable 2 milliGRAM(s) IV Push every 1 hour PRN  melatonin 3 milliGRAM(s) Oral at bedtime PRN  multivitamin 1 Tablet(s) Oral daily  ondansetron Injectable 4 milliGRAM(s) IV Push every 8 hours PRN  thiamine 100 milliGRAM(s) Oral daily      HEALTH ISSUES - PROBLEM Dx:  Alcohol use with withdrawal    MARTY (acute kidney injury)    Benign essential HTN    Seizures    Medication management    Swollen finger

## 2023-06-19 NOTE — DIETITIAN INITIAL EVALUATION ADULT - PROBLEM SELECTOR PROBLEM 5
Pt recently evaluated for epigastric pain, dysphagia, rectal bleeding and constipation. CT A/P 5/3/22: No acute abnormality. EGD 8/2/22: H Pylori + gastritis, reflux esophagitis and hiatal hernia COLONOSCOPY 8/2/22: Internal hemorrhoids. Pt has not started antibiotics for H Pylori infection, yet. She continues to have burning epigastric pain after eating. Constipation has resolved after starting miralax.
Benign essential HTN

## 2023-06-19 NOTE — DIETITIAN INITIAL EVALUATION ADULT - PERTINENT LABORATORY DATA
06-19    134<L>  |  102  |  8   ----------------------------<  107<H>  3.5   |  22  |  0.96    Ca    8.9      19 Jun 2023 07:20  Phos  2.4     06-19  Mg     1.3     06-19    POCT Blood Glucose.: 98 mg/dL (06-19-23 @ 05:28)

## 2023-06-19 NOTE — PROGRESS NOTE ADULT - PROBLEM SELECTOR PLAN 4
Chronic unstable  questionable seizure yesterday   [facility] in AM - pt is still confused. No info for the family  [facility] protocol as above   Seizure precautions

## 2023-06-19 NOTE — DIETITIAN INITIAL EVALUATION ADULT - OTHER INFO
Pt with PMHx of ETOH dependence; admitted for ETOH withdrawal and MARTY. Also with swollen finger (injury at work), seizures (? seizure yesterday), & HTN. Pt s/p RRT this AM (06/19) for hypertension.  Pt appears confused, with restlessness, Ativan PRN noted. Unable to obtain diet and wt hx.  Pt consuming 51-75% of documented meals as per flow sheet; requires total feeding assistance. PCA reports no difficulty with thin liquids, would likely benefit from easy to chew consistency. Will add Ensure Plus High Protein nutrition supplement for additional kcal & protein. RD remains available.

## 2023-06-20 DIAGNOSIS — M79.602 PAIN IN LEFT ARM: ICD-10-CM

## 2023-06-20 LAB
ALBUMIN SERPL ELPH-MCNC: 2 G/DL — LOW (ref 3.3–5)
ALP SERPL-CCNC: 94 U/L — SIGNIFICANT CHANGE UP (ref 40–120)
ALT FLD-CCNC: 27 U/L — SIGNIFICANT CHANGE UP (ref 12–78)
ANION GAP SERPL CALC-SCNC: 4 MMOL/L — LOW (ref 5–17)
AST SERPL-CCNC: 64 U/L — HIGH (ref 15–37)
BILIRUB SERPL-MCNC: 0.8 MG/DL — SIGNIFICANT CHANGE UP (ref 0.2–1.2)
BUN SERPL-MCNC: 10 MG/DL — SIGNIFICANT CHANGE UP (ref 7–23)
CALCIUM SERPL-MCNC: 8 MG/DL — LOW (ref 8.5–10.1)
CHLORIDE SERPL-SCNC: 104 MMOL/L — SIGNIFICANT CHANGE UP (ref 96–108)
CK SERPL-CCNC: 94 U/L — SIGNIFICANT CHANGE UP (ref 26–308)
CO2 SERPL-SCNC: 26 MMOL/L — SIGNIFICANT CHANGE UP (ref 22–31)
CREAT SERPL-MCNC: 1.02 MG/DL — SIGNIFICANT CHANGE UP (ref 0.5–1.3)
EGFR: 95 ML/MIN/1.73M2 — SIGNIFICANT CHANGE UP
GLUCOSE SERPL-MCNC: 143 MG/DL — HIGH (ref 70–99)
HCT VFR BLD CALC: 26.3 % — LOW (ref 39–50)
HGB BLD-MCNC: 9.4 G/DL — LOW (ref 13–17)
INR BLD: 1.3 RATIO — HIGH (ref 0.88–1.16)
MAGNESIUM SERPL-MCNC: 1.9 MG/DL — SIGNIFICANT CHANGE UP (ref 1.6–2.6)
MCHC RBC-ENTMCNC: 34.8 PG — HIGH (ref 27–34)
MCHC RBC-ENTMCNC: 35.7 G/DL — SIGNIFICANT CHANGE UP (ref 32–36)
MCV RBC AUTO: 97.4 FL — SIGNIFICANT CHANGE UP (ref 80–100)
NRBC # BLD: 0 /100 WBCS — SIGNIFICANT CHANGE UP (ref 0–0)
PHOSPHATE SERPL-MCNC: 3 MG/DL — SIGNIFICANT CHANGE UP (ref 2.5–4.5)
PLATELET # BLD AUTO: 108 K/UL — LOW (ref 150–400)
POTASSIUM SERPL-MCNC: 3.7 MMOL/L — SIGNIFICANT CHANGE UP (ref 3.5–5.3)
POTASSIUM SERPL-SCNC: 3.7 MMOL/L — SIGNIFICANT CHANGE UP (ref 3.5–5.3)
PROT SERPL-MCNC: 5.8 GM/DL — LOW (ref 6–8.3)
PROTHROM AB SERPL-ACNC: 15.5 SEC — HIGH (ref 10.5–13.4)
RBC # BLD: 2.7 M/UL — LOW (ref 4.2–5.8)
RBC # FLD: 13.9 % — SIGNIFICANT CHANGE UP (ref 10.3–14.5)
SODIUM SERPL-SCNC: 134 MMOL/L — LOW (ref 135–145)
WBC # BLD: 5.48 K/UL — SIGNIFICANT CHANGE UP (ref 3.8–10.5)
WBC # FLD AUTO: 5.48 K/UL — SIGNIFICANT CHANGE UP (ref 3.8–10.5)

## 2023-06-20 PROCEDURE — 99233 SBSQ HOSP IP/OBS HIGH 50: CPT

## 2023-06-20 PROCEDURE — 73070 X-RAY EXAM OF ELBOW: CPT | Mod: 26,LT

## 2023-06-20 PROCEDURE — 93931 UPPER EXTREMITY STUDY: CPT | Mod: 26

## 2023-06-20 PROCEDURE — 73030 X-RAY EXAM OF SHOULDER: CPT | Mod: 26,LT

## 2023-06-20 PROCEDURE — 73110 X-RAY EXAM OF WRIST: CPT | Mod: 26,LT

## 2023-06-20 RX ORDER — THIAMINE MONONITRATE (VIT B1) 100 MG
500 TABLET ORAL THREE TIMES A DAY
Refills: 0 | Status: COMPLETED | OUTPATIENT
Start: 2023-06-20 | End: 2023-06-23

## 2023-06-20 RX ORDER — IBUPROFEN 200 MG
400 TABLET ORAL EVERY 8 HOURS
Refills: 0 | Status: DISCONTINUED | OUTPATIENT
Start: 2023-06-20 | End: 2023-06-27

## 2023-06-20 RX ADMIN — Medication 1 MILLIGRAM(S): at 17:39

## 2023-06-20 RX ADMIN — Medication 2 MILLIGRAM(S): at 13:58

## 2023-06-20 RX ADMIN — Medication 100 MILLIGRAM(S): at 12:14

## 2023-06-20 RX ADMIN — ENOXAPARIN SODIUM 40 MILLIGRAM(S): 100 INJECTION SUBCUTANEOUS at 05:46

## 2023-06-20 RX ADMIN — Medication 650 MILLIGRAM(S): at 17:39

## 2023-06-20 RX ADMIN — Medication 105 MILLIGRAM(S): at 22:09

## 2023-06-20 RX ADMIN — Medication 1 TABLET(S): at 12:14

## 2023-06-20 RX ADMIN — Medication 650 MILLIGRAM(S): at 23:57

## 2023-06-20 RX ADMIN — Medication 650 MILLIGRAM(S): at 05:48

## 2023-06-20 RX ADMIN — Medication 0.2 MILLIGRAM(S): at 13:55

## 2023-06-20 RX ADMIN — Medication 650 MILLIGRAM(S): at 18:36

## 2023-06-20 RX ADMIN — Medication 650 MILLIGRAM(S): at 22:15

## 2023-06-20 RX ADMIN — Medication 650 MILLIGRAM(S): at 07:06

## 2023-06-20 RX ADMIN — SODIUM CHLORIDE 100 MILLILITER(S): 9 INJECTION, SOLUTION INTRAVENOUS at 12:15

## 2023-06-20 RX ADMIN — Medication 2 MILLIGRAM(S): at 05:47

## 2023-06-20 RX ADMIN — Medication 1 MILLIGRAM(S): at 22:07

## 2023-06-20 RX ADMIN — Medication 0.2 MILLIGRAM(S): at 22:16

## 2023-06-20 RX ADMIN — Medication 0.2 MILLIGRAM(S): at 05:48

## 2023-06-20 RX ADMIN — Medication 2 MILLIGRAM(S): at 01:54

## 2023-06-20 RX ADMIN — Medication 1 MILLIGRAM(S): at 12:14

## 2023-06-20 NOTE — PROGRESS NOTE ADULT - PROBLEM SELECTOR PLAN 4
Chronic unstable  questionable seizure yesterday   [facility] in AM - pt gave pharmacy "[facility]", called but they said only ibuprofen, rosuvastatin 20mg daily and vit D2, last  was 2022. still unclear.   [facility] protocol as above   Seizure precautions

## 2023-06-20 NOTE — PROGRESS NOTE ADULT - PROBLEM SELECTOR PLAN 3
Pt complaint L shoulder, elbow and wrist pain. Fingers are swollen  F/u CK to r/o rhabdo  F/u doppler upper extremities b/l to r/o DVT  F/u Xray L wrist, elbow and shoulder to r/o bone pathology  Added ibuprofen PRN for moderate pain

## 2023-06-20 NOTE — PROGRESS NOTE ADULT - SUBJECTIVE AND OBJECTIVE BOX
Patient is a 43y old  Male who presents with a chief complaint of alcohol withdrawal, MARTY (19 Jun 2023 17:06)      INTERVAL HPI/OVERNIGHT EVENTS:  Pt is more alert and A&O x3, states he has L arm pain and he cannot move due to pain. He states he has wife but doesn't remember her phone number.     REVIEW OF SYSTEMS:  CONSTITUTIONAL: No fever, weight loss, or fatigue  EYES: No eye pain, visual disturbances, or discharge  ENMT:  No difficulty hearing, tinnitus, vertigo; No sinus or throat pain  NECK: No pain or stiffness  RESPIRATORY: No cough, wheezing, chills or hemoptysis; No shortness of breath  CARDIOVASCULAR: No chest pain, palpitations, dizziness, or leg swelling  GASTROINTESTINAL: No abdominal or epigastric pain. No nausea, vomiting, or hematemesis; No diarrhea or constipation. No melena or hematochezia.  GENITOURINARY: No dysuria, frequency, hematuria, or incontinence  NEUROLOGICAL: No headaches, +memory loss, no loss of strength, numbness, or tremors  SKIN: No itching, burning, rashes, or lesions   LYMPH NODES: No enlarged glands  ENDOCRINE: No heat or cold intolerance; No hair loss  MUSCULOSKELETAL: + joint pain and swelling in L hand and arm,  No muscle, back  pain        FAMILY HISTORY:  No pertinent family history in first degree relatives      Vital Signs Last 24 Hrs  T(C): 37.7 (20 Jun 2023 11:47), Max: 38.2 (19 Jun 2023 21:45)  T(F): 99.9 (20 Jun 2023 11:47), Max: 100.8 (19 Jun 2023 21:45)  HR: 100 (20 Jun 2023 11:47) (96 - 119)  BP: 152/93 (20 Jun 2023 11:47) (114/85 - 180/100)  BP(mean): --  RR: 17 (20 Jun 2023 11:47) (17 - 18)  SpO2: 100% (20 Jun 2023 11:47) (96% - 100%)    Parameters below as of 20 Jun 2023 11:47  Patient On (Oxygen Delivery Method): room air        06-19-23 @ 07:01  -  06-20-23 @ 07:00  --------------------------------------------------------  IN: 1200 mL / OUT: 1000 mL / NET: 200 mL    06-20-23 @ 07:01  -  06-20-23 @ 15:23  --------------------------------------------------------  IN: 720 mL / OUT: 600 mL / NET: 120 mL        PHYSICAL EXAM:  GENERAL: NAD, well-groomed, well-developed  HEAD:  Atraumatic, Normocephalic  EYES: EOMI, conjunctiva and sclera clear  ENMT: No tonsillar erythema, exudates, or enlargement; Moist mucous membranes  NECK: Supple, No JVD, Normal thyroid  NERVOUS SYSTEM:  Alert & Oriented X3, Good concentration; Moving all extremities  CHEST/LUNG:  No rales, rhonchi, wheezing, or rubs  HEART: Regular rate and rhythm; No murmurs, rubs, or gallops  ABDOMEN: Soft, Nontender, Nondistended; Bowel sounds present  EXTREMITIES:  2+ Peripheral Pulses, No clubbing, cyanosis, + swelling in hand joints, no edema  LYMPH: No lymphadenopathy noted  SKIN: No rashes or lesions    Consultant(s) Notes Reviewed:  [x ] YES  [ ] NO  Care Discussed with Consultants/Other Providers [ x] YES  [ ] NO    LABS:        RADIOLOGY & ADDITIONAL TESTS:    Imaging Personally Reviewed:  [ ] YES  [ ] NO  acetaminophen     Tablet .. 650 milliGRAM(s) Oral every 6 hours PRN  aluminum hydroxide/magnesium hydroxide/simethicone Suspension 30 milliLiter(s) Oral every 4 hours PRN  cloNIDine 0.2 milliGRAM(s) Oral three times a day  enoxaparin Injectable 40 milliGRAM(s) SubCutaneous every 24 hours  folic acid 1 milliGRAM(s) Oral daily  ibuprofen  Tablet. 400 milliGRAM(s) Oral every 8 hours PRN  lactated ringers. 1000 milliLiter(s) IV Continuous <Continuous>  LORazepam   Injectable 1 milliGRAM(s) IV Push every 4 hours  LORazepam   Injectable   IV Push   LORazepam   Injectable 2 milliGRAM(s) IV Push every 1 hour PRN  melatonin 3 milliGRAM(s) Oral at bedtime PRN  multivitamin 1 Tablet(s) Oral daily  ondansetron Injectable 4 milliGRAM(s) IV Push every 8 hours PRN  thiamine IVPB 500 milliGRAM(s) IV Intermittent three times a day      HEALTH ISSUES - PROBLEM Dx:  Alcohol use with withdrawal    MARTY (acute kidney injury)    Benign essential HTN    Seizures    Medication management    Swollen finger

## 2023-06-21 DIAGNOSIS — E83.39 OTHER DISORDERS OF PHOSPHORUS METABOLISM: ICD-10-CM

## 2023-06-21 LAB
ANION GAP SERPL CALC-SCNC: 2 MMOL/L — LOW (ref 5–17)
APPEARANCE UR: CLEAR — SIGNIFICANT CHANGE UP
BACTERIA # UR AUTO: ABNORMAL
BILIRUB UR-MCNC: NEGATIVE — SIGNIFICANT CHANGE UP
BUN SERPL-MCNC: 12 MG/DL — SIGNIFICANT CHANGE UP (ref 7–23)
CALCIUM SERPL-MCNC: 8.5 MG/DL — SIGNIFICANT CHANGE UP (ref 8.5–10.1)
CHLORIDE SERPL-SCNC: 103 MMOL/L — SIGNIFICANT CHANGE UP (ref 96–108)
CO2 SERPL-SCNC: 27 MMOL/L — SIGNIFICANT CHANGE UP (ref 22–31)
COLOR SPEC: YELLOW — SIGNIFICANT CHANGE UP
CREAT SERPL-MCNC: 1.01 MG/DL — SIGNIFICANT CHANGE UP (ref 0.5–1.3)
DIFF PNL FLD: NEGATIVE — SIGNIFICANT CHANGE UP
EGFR: 96 ML/MIN/1.73M2 — SIGNIFICANT CHANGE UP
ERYTHROCYTE [SEDIMENTATION RATE] IN BLOOD: 94 MM/HR — HIGH (ref 0–15)
GLUCOSE SERPL-MCNC: 110 MG/DL — HIGH (ref 70–99)
GLUCOSE UR QL: NEGATIVE MG/DL — SIGNIFICANT CHANGE UP
HCT VFR BLD CALC: 25.5 % — LOW (ref 39–50)
HGB BLD-MCNC: 8.8 G/DL — LOW (ref 13–17)
KETONES UR-MCNC: NEGATIVE — SIGNIFICANT CHANGE UP
LACTATE SERPL-SCNC: 1.8 MMOL/L — SIGNIFICANT CHANGE UP (ref 0.7–2)
LEUKOCYTE ESTERASE UR-ACNC: NEGATIVE — SIGNIFICANT CHANGE UP
MAGNESIUM SERPL-MCNC: 1.8 MG/DL — SIGNIFICANT CHANGE UP (ref 1.6–2.6)
MCHC RBC-ENTMCNC: 34 PG — SIGNIFICANT CHANGE UP (ref 27–34)
MCHC RBC-ENTMCNC: 34.5 G/DL — SIGNIFICANT CHANGE UP (ref 32–36)
MCV RBC AUTO: 98.5 FL — SIGNIFICANT CHANGE UP (ref 80–100)
NITRITE UR-MCNC: NEGATIVE — SIGNIFICANT CHANGE UP
NRBC # BLD: 0 /100 WBCS — SIGNIFICANT CHANGE UP (ref 0–0)
PH UR: 7 — SIGNIFICANT CHANGE UP (ref 5–8)
PHOSPHATE SERPL-MCNC: 2.1 MG/DL — LOW (ref 2.5–4.5)
PLATELET # BLD AUTO: 134 K/UL — LOW (ref 150–400)
POTASSIUM SERPL-MCNC: 3.9 MMOL/L — SIGNIFICANT CHANGE UP (ref 3.5–5.3)
POTASSIUM SERPL-SCNC: 3.9 MMOL/L — SIGNIFICANT CHANGE UP (ref 3.5–5.3)
PROT UR-MCNC: 30 MG/DL
RBC # BLD: 2.59 M/UL — LOW (ref 4.2–5.8)
RBC # FLD: 14.2 % — SIGNIFICANT CHANGE UP (ref 10.3–14.5)
RBC CASTS # UR COMP ASSIST: NEGATIVE /HPF — SIGNIFICANT CHANGE UP (ref 0–4)
SODIUM SERPL-SCNC: 132 MMOL/L — LOW (ref 135–145)
SP GR SPEC: 1 — LOW (ref 1.01–1.02)
URATE SERPL-MCNC: 6.2 MG/DL — SIGNIFICANT CHANGE UP (ref 3.4–8.8)
UROBILINOGEN FLD QL: 8 MG/DL
WBC # BLD: 5.73 K/UL — SIGNIFICANT CHANGE UP (ref 3.8–10.5)
WBC # FLD AUTO: 5.73 K/UL — SIGNIFICANT CHANGE UP (ref 3.8–10.5)
WBC UR QL: NEGATIVE — SIGNIFICANT CHANGE UP

## 2023-06-21 PROCEDURE — 71045 X-RAY EXAM CHEST 1 VIEW: CPT | Mod: 26

## 2023-06-21 PROCEDURE — 99233 SBSQ HOSP IP/OBS HIGH 50: CPT

## 2023-06-21 RX ORDER — MORPHINE SULFATE 50 MG/1
2 CAPSULE, EXTENDED RELEASE ORAL EVERY 6 HOURS
Refills: 0 | Status: DISCONTINUED | OUTPATIENT
Start: 2023-06-21 | End: 2023-06-27

## 2023-06-21 RX ORDER — ACETAMINOPHEN 500 MG
1000 TABLET ORAL ONCE
Refills: 0 | Status: COMPLETED | OUTPATIENT
Start: 2023-06-21 | End: 2023-06-21

## 2023-06-21 RX ORDER — MAGNESIUM SULFATE 500 MG/ML
2 VIAL (ML) INJECTION ONCE
Refills: 0 | Status: COMPLETED | OUTPATIENT
Start: 2023-06-21 | End: 2023-06-21

## 2023-06-21 RX ORDER — COLCHICINE 0.6 MG
1.2 TABLET ORAL ONCE
Refills: 0 | Status: COMPLETED | OUTPATIENT
Start: 2023-06-21 | End: 2023-06-21

## 2023-06-21 RX ORDER — COLCHICINE 0.6 MG
0.6 TABLET ORAL DAILY
Refills: 0 | Status: DISCONTINUED | OUTPATIENT
Start: 2023-06-21 | End: 2023-06-21

## 2023-06-21 RX ADMIN — Medication 105 MILLIGRAM(S): at 21:59

## 2023-06-21 RX ADMIN — Medication 1000 MILLIGRAM(S): at 18:30

## 2023-06-21 RX ADMIN — Medication 105 MILLIGRAM(S): at 05:35

## 2023-06-21 RX ADMIN — Medication 400 MILLIGRAM(S): at 18:12

## 2023-06-21 RX ADMIN — Medication 0.2 MILLIGRAM(S): at 13:21

## 2023-06-21 RX ADMIN — Medication 1 MILLIGRAM(S): at 13:17

## 2023-06-21 RX ADMIN — Medication 650 MILLIGRAM(S): at 13:18

## 2023-06-21 RX ADMIN — Medication 25 GRAM(S): at 15:14

## 2023-06-21 RX ADMIN — Medication 1 MILLIGRAM(S): at 13:19

## 2023-06-21 RX ADMIN — Medication 1.2 MILLIGRAM(S): at 20:33

## 2023-06-21 RX ADMIN — Medication 105 MILLIGRAM(S): at 14:27

## 2023-06-21 RX ADMIN — Medication 400 MILLIGRAM(S): at 18:30

## 2023-06-21 RX ADMIN — Medication 1 MILLIGRAM(S): at 05:34

## 2023-06-21 RX ADMIN — Medication 650 MILLIGRAM(S): at 14:00

## 2023-06-21 RX ADMIN — ENOXAPARIN SODIUM 40 MILLIGRAM(S): 100 INJECTION SUBCUTANEOUS at 05:35

## 2023-06-21 RX ADMIN — Medication 0.2 MILLIGRAM(S): at 05:35

## 2023-06-21 RX ADMIN — Medication 400 MILLIGRAM(S): at 17:03

## 2023-06-21 RX ADMIN — Medication 0.2 MILLIGRAM(S): at 21:59

## 2023-06-21 RX ADMIN — Medication 1 TABLET(S): at 13:18

## 2023-06-21 RX ADMIN — Medication 62.5 MILLIMOLE(S): at 15:13

## 2023-06-21 NOTE — PROGRESS NOTE ADULT - PROBLEM SELECTOR PLAN 3
Pt complaint L shoulder, elbow and wrist pain. Fingers are swollen  F/u CK to r/o rhabdo-->94  F/u doppler upper extremities b/l to r/o DVT--> superficial thrombus left upper extermity . elevate arm supportive care  F/u Xray L wrist, elbow and shoulder to r/o bone pathology   check uric acid  Added ibuprofen PRN for moderate pain Pt complaint L shoulder, elbow and wrist pain. Fingers are swollen  F/u CK to r/o rhabdo-->94  F/u doppler upper extremities b/l to r/o DVT--> superficial thrombus left upper extremity . elevate arm supportive care  F/u Xray L wrist, elbow and shoulder to r/o bone pathology   check uric acid, f/u esr crp for suspicion of gout  my colleague added ibuprofen PRN for moderate pain

## 2023-06-21 NOTE — PROGRESS NOTE ADULT - PROBLEM SELECTOR PLAN 2
Reports injury at work  Xray R hand ordered f/u Reports injury at work  Xray R hand ordered f/u   suspect gout f/u uric acid , esr ,crp

## 2023-06-21 NOTE — CONSULT NOTE ADULT - SUBJECTIVE AND OBJECTIVE BOX
43y RHD Male community ambulatory presents with L elbow/shoulder  pain (elbow >>shld) and R middle finger pain. Pt states this elbow/shoulder pains started a week ago spontaneously. Endorses similar pains in the past, but says they have never lasted this long. Has been febrile while in the hospital with EtOH withdrawals. Pt states he has had seizures recently. 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 Pt denies any recent trauma to the L elbow specifically, but cannot accurately recall. Denies numbness/tingling.     HEALTH ISSUES - PROBLEM Dx:  Alcohol use with withdrawal    MARTY (acute kidney injury)    Benign essential HTN    Seizures    Medication management    Swollen finger    Left arm pain    Hypophosphatemia          MEDICATIONS  (STANDING):  cloNIDine 0.2 milliGRAM(s) Oral three times a day  enoxaparin Injectable 40 milliGRAM(s) SubCutaneous every 24 hours  folic acid 1 milliGRAM(s) Oral daily  LORazepam   Injectable   IV Push   multivitamin 1 Tablet(s) Oral daily  thiamine IVPB 500 milliGRAM(s) IV Intermittent three times a day    Allergies    No Known Allergies    Intolerances                            8.8    5.73  )-----------( 134      ( 21 Jun 2023 06:06 )             25.5     21 Jun 2023 06:06    132    |  103    |  12     ----------------------------<  110    3.9     |  27     |  1.01     Ca    8.5        21 Jun 2023 06:06  Phos  2.1       21 Jun 2023 06:06  Mg     1.8       21 Jun 2023 06:06    TPro  5.8    /  Alb  2.0    /  TBili  0.8    /  DBili  x      /  AST  64     /  ALT  27     /  AlkPhos  94     20 Jun 2023 05:30    PT/INR - ( 20 Jun 2023 05:30 )   PT: 15.5 sec;   INR: 1.30 ratio           Vital Signs Last 24 Hrs  T(C): 37.2 (06-21-23 @ 18:00), Max: 39.3 (06-21-23 @ 13:31)  T(F): 99 (06-21-23 @ 18:00), Max: 102.8 (06-21-23 @ 13:31)  HR: 81 (06-21-23 @ 21:55) (81 - 101)  BP: 121/75 (06-21-23 @ 21:55) (114/74 - 163/102)  BP(mean): --  RR: 16 (06-21-23 @ 21:55) (16 - 19)  SpO2: 97% (06-21-23 @ 21:55) (97% - 100%)    Physical Exam  Gen: NAD, awake and alert.    RUE: middle finger swelling noted.  Skin intact, +TTP throughout MF, no bony ttp elsewhere throughout RUE, compartments soft/compressive, extremity warm/well perfused. No elbow or shoulder tenderness or swelling. 2+ radial pulse, +Ax/Musc/Med/Uln/Rad/N/PIN, SILT C5-T1    LUE: Mild elbow swelling noted.  Skin intact, no erythema, +no pain with micromotion of elbow or shoulder, experiences pain at terminal flexion and extension of elbow (AROM is 20-90, PROM is 5-100). TTP by olecranon tip, no bony ttp elsewhere throughout RUE, compartments soft/compressive, extremity warm/well perfused.2+ radial pulse, +Ax/Musc/Med/Uln/Rad/N/PIN, SILT C5-T1    Secondary Assessment:  NC/AT, NTTP of clavicles, NTTP of C-,T-,L-Spine, NTTP of Pelvis  UEs: NTTP of Shoulders, Elbows, Wrists, Hands; NT with AROM/PROM of Shoulders, Elbows, Wrists, Hands; AIN/PIN/Med/Uln/Msc/Rad/Ax intact  LEs: Able to SLR, NT with Log Roll, NT with Heel Strike, NTTP of Hips, Knees, Ankles, Feet; NT with AROM/PROM of Hips, Knees, Ankles, Feet; Q/H/Gsc/TA/EHL/FHL intact    Secondary Assessment:  NC/AT, NTTP of clavicles, NTTP of C-,T-,L-Spine, NTTP of Pelvis  LEs: Able to SLR, NT with Log Roll, NT with Heel Strike, NTTP of Hips, Knees, Ankles, Feet; NT with AROM/PROM of Hips, Knees, Ankles, Feet; Q/H/Gsc/TA/EHL/FHL intact    Imaging:  Xray elbow/shoulder: no obvious fractures or dislocations. Soft tissue swelling noted by the elbow.  BLLUE Duplex: Superficial venous thrombosis/thrombophlebitis of the left basilic vein. No evidence of deep venous thrombosis in either upper extremity.    A/P: 43y Male with L elbow pain, likely due to non-septic olecranon bursitis; pt also likely has a fracture of the R middle finger    Pt likely does not have septic joint infections at this time given the lack of micromotion in all the joints and able to move the joints relatively well despite having pain for over a week. Olecranon bursa is slightly swollen and tender to touch, but not overly erythematic with gross palpable effusions. Any other arm or shoulder pain may be more related to the superficial basilic vein thrombophlebitis.     -Recommended to start warm compresses and elevation of the L elbow. No need to aspirate at this time.  -R middle finger should have x-rays performed with hand consultation (plastic surgery at [facility]). Defer to hand consultation for further recommendations for R middle finger  -Would recommend ID consults for further infectious workup as fevers likely not due to septic joint at this time.    -FU blood and urine cultures  -Pain control with anti-inflammatories for the probable aspetic bursitis   -WBAT of LUE; would likely keep R hand NWB, but definitive recommendations must be made by the hand team  -Medical management and DVT ppx per primary team  -No indication for acute orthopedic surgical intervention regarding the L elbow or shoulder  -Will discuss above with Dr. Mayes and adjust plan as needed   43y RHD Male community ambulatory presents with L elbow/shoulder  pain (elbow >>shld) and R middle finger pain. Pt states this elbow/shoulder pains started a week ago spontaneously. Endorses similar pains in the past, but says they have never lasted this long. Has been febrile while in the hospital with EtOH withdrawals. Pt states he has had seizures recently. 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 Pt denies any recent trauma to the L elbow specifically, but cannot accurately recall. Denies numbness/tingling.     HEALTH ISSUES - PROBLEM Dx:  Alcohol use with withdrawal    MARTY (acute kidney injury)    Benign essential HTN    Seizures    Medication management    Swollen finger    Left arm pain    Hypophosphatemia          MEDICATIONS  (STANDING):  cloNIDine 0.2 milliGRAM(s) Oral three times a day  enoxaparin Injectable 40 milliGRAM(s) SubCutaneous every 24 hours  folic acid 1 milliGRAM(s) Oral daily  LORazepam   Injectable   IV Push   multivitamin 1 Tablet(s) Oral daily  thiamine IVPB 500 milliGRAM(s) IV Intermittent three times a day    Allergies    No Known Allergies    Intolerances                            8.8    5.73  )-----------( 134      ( 21 Jun 2023 06:06 )             25.5     21 Jun 2023 06:06    132    |  103    |  12     ----------------------------<  110    3.9     |  27     |  1.01     Ca    8.5        21 Jun 2023 06:06  Phos  2.1       21 Jun 2023 06:06  Mg     1.8       21 Jun 2023 06:06    TPro  5.8    /  Alb  2.0    /  TBili  0.8    /  DBili  x      /  AST  64     /  ALT  27     /  AlkPhos  94     20 Jun 2023 05:30    PT/INR - ( 20 Jun 2023 05:30 )   PT: 15.5 sec;   INR: 1.30 ratio           Vital Signs Last 24 Hrs  T(C): 37.2 (06-21-23 @ 18:00), Max: 39.3 (06-21-23 @ 13:31)  T(F): 99 (06-21-23 @ 18:00), Max: 102.8 (06-21-23 @ 13:31)  HR: 81 (06-21-23 @ 21:55) (81 - 101)  BP: 121/75 (06-21-23 @ 21:55) (114/74 - 163/102)  BP(mean): --  RR: 16 (06-21-23 @ 21:55) (16 - 19)  SpO2: 97% (06-21-23 @ 21:55) (97% - 100%)    Physical Exam  Gen: NAD, awake and alert.    RUE: middle finger swelling noted.  Skin intact, +TTP throughout MF, no bony ttp elsewhere throughout RUE, compartments soft/compressive, extremity warm/well perfused. No elbow or shoulder tenderness or swelling. 2+ radial pulse, +Ax/Musc/Med/Uln/Rad/N/PIN, SILT C5-T1    LUE: Mild elbow swelling noted.  Skin intact, no erythema, +no pain with micromotion of elbow or shoulder, experiences pain at terminal flexion and extension of elbow (AROM is 20-90, PROM is 5-100). TTP by olecranon tip, no bony ttp elsewhere throughout RUE, compartments soft/compressive, extremity warm/well perfused.2+ radial pulse, +Ax/Musc/Med/Uln/Rad/N/PIN, SILT C5-T1      Secondary Assessment:  NC/AT, NTTP of clavicles, NTTP of C-,T-,L-Spine, NTTP of Pelvis  LEs: Able to SLR, NT with Log Roll, NT with Heel Strike, NTTP of Hips, Knees, Ankles, Feet; NT with AROM/PROM of Hips, Knees, Ankles, Feet; Q/H/Gsc/TA/EHL/FHL intact    Imaging:  Xray elbow/shoulder: no obvious fractures or dislocations. Soft tissue swelling noted by the elbow.  BLLUE Duplex: Superficial venous thrombosis/thrombophlebitis of the left basilic vein. No evidence of deep venous thrombosis in either upper extremity.    A/P: 43y Male with L elbow pain, likely due to non-septic olecranon bursitis; pt also likely has a fracture of the R middle finger    Pt likely does not have septic joint infections at this time given the lack of micromotion in all the joints and able to move the joints relatively well despite having pain for over a week. Olecranon bursa is slightly swollen and tender to touch, but not overly erythematic with gross palpable effusions. Any other arm or shoulder pain may be more related to the superficial basilic vein thrombophlebitis.     -Recommended to start warm compresses and elevation of the L elbow. No need to aspirate at this time.  -R middle finger should have x-rays performed with hand consultation (plastic surgery at [facility]). Defer to hand consultation for further recommendations for R middle finger  -Would recommend ID consults for further infectious workup as fevers likely not due to septic joint at this time.    -FU blood and urine cultures  -Pain control with anti-inflammatories for the probable aspetic bursitis   -WBAT of LUE; would likely keep R hand NWB, but definitive recommendations must be made by the hand team  -Medical management and DVT ppx per primary team  -No indication for acute orthopedic surgical intervention regarding the L elbow or shoulder  -Will discuss above with Dr. Mayes and adjust plan as needed

## 2023-06-21 NOTE — PROGRESS NOTE ADULT - SUBJECTIVE AND OBJECTIVE BOX
Patient is a 43y old  Male who presents with a chief complaint of alcohol withdrawal, MARTY (19 Jun 2023 17:06)      INTERVAL HPI/OVERNIGHT EVENTS:      MEDICATIONS  (PRN):  acetaminophen     Tablet .. 650 milliGRAM(s) Oral every 6 hours PRN Temp greater or equal to 38C (100.4F), Mild Pain (1 - 3)  aluminum hydroxide/magnesium hydroxide/simethicone Suspension 30 milliLiter(s) Oral every 4 hours PRN Dyspepsia  ibuprofen  Tablet. 400 milliGRAM(s) Oral every 8 hours PRN Moderate Pain (4 - 6)  LORazepam   Injectable 2 milliGRAM(s) IV Push every 1 hour PRN Symptom-triggered: each CIWA -Ar score 8 or GREATER  melatonin 3 milliGRAM(s) Oral at bedtime PRN Insomnia  ondansetron Injectable 4 milliGRAM(s) IV Push every 8 hours PRN Nausea and/or Vomiting      FAMILY HISTORY:  No pertinent family history in first degree relatives    Vital Signs Last 24 Hrs  T(C): 39 (21 Jun 2023 13:15), Max: 39 (21 Jun 2023 13:15)  T(F): 102.2 (21 Jun 2023 13:15), Max: 102.2 (21 Jun 2023 13:15)  HR: 98 (21 Jun 2023 13:15) (85 - 101)  BP: 152/97 (21 Jun 2023 13:15) (135/91 - 154/92)  BP(mean): --  RR: 19 (21 Jun 2023 13:15) (18 - 19)  SpO2: 100% (21 Jun 2023 13:15) (98% - 100%)    Parameters below as of 21 Jun 2023 13:15  Patient On (Oxygen Delivery Method): room air          PHYSICAL EXAM:  GENERAL: NAD, well-groomed, well-developed  HEAD:  Atraumatic, Normocephalic  EYES: EOMI, conjunctiva and sclera clear  ENMT: No tonsillar erythema, exudates, or enlargement; Moist mucous membranes  NECK: Supple   NERVOUS SYSTEM:  Alert & Oriented X3, Good concentration; Moving all extremities  CHEST/LUNG:  No rales, rhonchi, wheezing, or rubs  HEART: Regular rate and rhythm; No murmurs, rubs, or gallops  ABDOMEN: Soft, Nontender, Nondistended; Bowel sounds present  EXTREMITIES:  2+ Peripheral Pulses, No clubbing, cyanosis, + swelling in hand joints, no edema    SKIN: No rashes or lesions    LABS:                        8.8    5.73  )-----------( 134      ( 21 Jun 2023 06:06 )             25.5   06-21    132<L>  |  103  |  12  ----------------------------<  110<H>  3.9   |  27  |  1.01    Ca    8.5      21 Jun 2023 06:06  Phos  2.1     06-21  Mg     1.8     06-21    TPro  5.8<L>  /  Alb  2.0<L>  /  TBili  0.8  /  DBili  x   /  AST  64<H>  /  ALT  27  /  AlkPhos  94  06-20    Creatine Kinase, Serum (06.20.23 @ 05:30)    Creatine Kinase, Serum: 94 U/L           Patient is a 43y old  Male who presents with a chief complaint of alcohol withdrawal, MARTY (19 Jun 2023 17:06)      INTERVAL HPI/OVERNIGHT EVENTS:      MEDICATIONS  (PRN):  acetaminophen     Tablet .. 650 milliGRAM(s) Oral every 6 hours PRN Temp greater or equal to 38C (100.4F), Mild Pain (1 - 3)  aluminum hydroxide/magnesium hydroxide/simethicone Suspension 30 milliLiter(s) Oral every 4 hours PRN Dyspepsia  ibuprofen  Tablet. 400 milliGRAM(s) Oral every 8 hours PRN Moderate Pain (4 - 6)  LORazepam   Injectable 2 milliGRAM(s) IV Push every 1 hour PRN Symptom-triggered: each CIWA -Ar score 8 or GREATER  melatonin 3 milliGRAM(s) Oral at bedtime PRN Insomnia  ondansetron Injectable 4 milliGRAM(s) IV Push every 8 hours PRN Nausea and/or Vomiting      FAMILY HISTORY:  No pertinent family history in first degree relatives    Vital Signs Last 24 Hrs  T(C): 39 (21 Jun 2023 13:15), Max: 39 (21 Jun 2023 13:15)  T(F): 102.2 (21 Jun 2023 13:15), Max: 102.2 (21 Jun 2023 13:15)  HR: 98 (21 Jun 2023 13:15) (85 - 101)  BP: 152/97 (21 Jun 2023 13:15) (135/91 - 154/92)  BP(mean): --  RR: 19 (21 Jun 2023 13:15) (18 - 19)  SpO2: 100% (21 Jun 2023 13:15) (98% - 100%)    Parameters below as of 21 Jun 2023 13:15  Patient On (Oxygen Delivery Method): room air          PHYSICAL EXAM:  GENERAL: NAD, well-groomed, well-developed  HEAD:  Atraumatic, Normocephalic  EYES: EOMI, conjunctiva and sclera clear  ENMT: No tonsillar erythema, exudates, or enlargement; Moist mucous membranes  NECK: Supple   NERVOUS SYSTEM:  Alert & Oriented X3, Good concentration; Moving all extremities  CHEST/LUNG:  No rales, rhonchi, wheezing, or rubs  HEART: Regular rate and rhythm; No murmurs, rubs, or gallops  ABDOMEN: Soft, Nontender, Nondistended; Bowel sounds present  EXTREMITIES:  2+ Peripheral Pulses, No clubbing, cyanosis, + swelling in hand joints,  more on right middle finger , left ankle warm and swelling and tender left upper extremity swollen and elbow warm,  right knee warm tender  SKIN: No rashes or lesions    LABS:                        8.8    5.73  )-----------( 134      ( 21 Jun 2023 06:06 )             25.5   06-21    132<L>  |  103  |  12  ----------------------------<  110<H>  3.9   |  27  |  1.01    Ca    8.5      21 Jun 2023 06:06  Phos  2.1     06-21  Mg     1.8     06-21    TPro  5.8<L>  /  Alb  2.0<L>  /  TBili  0.8  /  DBili  x   /  AST  64<H>  /  ALT  27  /  AlkPhos  94  06-20    Creatine Kinase, Serum (06.20.23 @ 05:30)    Creatine Kinase, Serum: 94 U/L

## 2023-06-21 NOTE — PROGRESS NOTE ADULT - PROBLEM SELECTOR PLAN 4
per my colleague's documentation "Chronic unstable  questionable seizure yesterday   [facility] in AM - pt gave pharmacy "[facility] in [facility]", called but they said only ibuprofen, rosuvastatin 20mg daily and vit D2, last  was 2022. still unclear.   [facility] protocol as above   Seizure precautions"    6/21/2023 will get eeg   ct head noted

## 2023-06-22 LAB
ANION GAP SERPL CALC-SCNC: 4 MMOL/L — LOW (ref 5–17)
BUN SERPL-MCNC: 12 MG/DL — SIGNIFICANT CHANGE UP (ref 7–23)
CALCIUM SERPL-MCNC: 8.4 MG/DL — LOW (ref 8.5–10.1)
CHLORIDE SERPL-SCNC: 104 MMOL/L — SIGNIFICANT CHANGE UP (ref 96–108)
CO2 SERPL-SCNC: 26 MMOL/L — SIGNIFICANT CHANGE UP (ref 22–31)
CREAT SERPL-MCNC: 1 MG/DL — SIGNIFICANT CHANGE UP (ref 0.5–1.3)
CRP SERPL-MCNC: 256 MG/L — HIGH
EGFR: 97 ML/MIN/1.73M2 — SIGNIFICANT CHANGE UP
GLUCOSE SERPL-MCNC: 105 MG/DL — HIGH (ref 70–99)
HCT VFR BLD CALC: 24.4 % — LOW (ref 39–50)
HGB BLD-MCNC: 8.3 G/DL — LOW (ref 13–17)
MAGNESIUM SERPL-MCNC: 2.2 MG/DL — SIGNIFICANT CHANGE UP (ref 1.6–2.6)
MCHC RBC-ENTMCNC: 34 G/DL — SIGNIFICANT CHANGE UP (ref 32–36)
MCHC RBC-ENTMCNC: 34.4 PG — HIGH (ref 27–34)
MCV RBC AUTO: 101.2 FL — HIGH (ref 80–100)
MRSA PCR RESULT.: SIGNIFICANT CHANGE UP
NRBC # BLD: 0 /100 WBCS — SIGNIFICANT CHANGE UP (ref 0–0)
PHOSPHATE SERPL-MCNC: 3.3 MG/DL — SIGNIFICANT CHANGE UP (ref 2.5–4.5)
PLATELET # BLD AUTO: 180 K/UL — SIGNIFICANT CHANGE UP (ref 150–400)
POTASSIUM SERPL-MCNC: 3.7 MMOL/L — SIGNIFICANT CHANGE UP (ref 3.5–5.3)
POTASSIUM SERPL-SCNC: 3.7 MMOL/L — SIGNIFICANT CHANGE UP (ref 3.5–5.3)
PROCALCITONIN SERPL-MCNC: 1.81 NG/ML — HIGH (ref 0.02–0.1)
RBC # BLD: 2.41 M/UL — LOW (ref 4.2–5.8)
RBC # FLD: 14.2 % — SIGNIFICANT CHANGE UP (ref 10.3–14.5)
RHEUMATOID FACT SERPL-ACNC: 26 IU/ML — HIGH (ref 0–13)
S AUREUS DNA NOSE QL NAA+PROBE: SIGNIFICANT CHANGE UP
SODIUM SERPL-SCNC: 134 MMOL/L — LOW (ref 135–145)
WBC # BLD: 5.49 K/UL — SIGNIFICANT CHANGE UP (ref 3.8–10.5)
WBC # FLD AUTO: 5.49 K/UL — SIGNIFICANT CHANGE UP (ref 3.8–10.5)

## 2023-06-22 PROCEDURE — 99232 SBSQ HOSP IP/OBS MODERATE 35: CPT

## 2023-06-22 PROCEDURE — 73120 X-RAY EXAM OF HAND: CPT | Mod: 26,RT

## 2023-06-22 RX ORDER — COLCHICINE 0.6 MG
1.2 TABLET ORAL ONCE
Refills: 0 | Status: COMPLETED | OUTPATIENT
Start: 2023-06-22 | End: 2023-06-22

## 2023-06-22 RX ORDER — COLCHICINE 0.6 MG
0.6 TABLET ORAL DAILY
Refills: 0 | Status: DISCONTINUED | OUTPATIENT
Start: 2023-06-23 | End: 2023-06-27

## 2023-06-22 RX ADMIN — Medication 1.2 MILLIGRAM(S): at 18:40

## 2023-06-22 RX ADMIN — Medication 1 MILLIGRAM(S): at 17:15

## 2023-06-22 RX ADMIN — ENOXAPARIN SODIUM 40 MILLIGRAM(S): 100 INJECTION SUBCUTANEOUS at 06:37

## 2023-06-22 RX ADMIN — Medication 105 MILLIGRAM(S): at 13:38

## 2023-06-22 RX ADMIN — Medication 650 MILLIGRAM(S): at 17:16

## 2023-06-22 RX ADMIN — Medication 0.2 MILLIGRAM(S): at 13:38

## 2023-06-22 RX ADMIN — Medication 1 TABLET(S): at 11:46

## 2023-06-22 RX ADMIN — Medication 1 MILLIGRAM(S): at 11:46

## 2023-06-22 RX ADMIN — Medication 0.2 MILLIGRAM(S): at 06:37

## 2023-06-22 RX ADMIN — Medication 105 MILLIGRAM(S): at 07:10

## 2023-06-22 RX ADMIN — MORPHINE SULFATE 2 MILLIGRAM(S): 50 CAPSULE, EXTENDED RELEASE ORAL at 01:10

## 2023-06-22 RX ADMIN — MORPHINE SULFATE 2 MILLIGRAM(S): 50 CAPSULE, EXTENDED RELEASE ORAL at 21:34

## 2023-06-22 RX ADMIN — Medication 105 MILLIGRAM(S): at 22:46

## 2023-06-22 RX ADMIN — Medication 0.3 MILLIGRAM(S): at 22:46

## 2023-06-22 RX ADMIN — Medication 1 MILLIGRAM(S): at 06:39

## 2023-06-22 RX ADMIN — MORPHINE SULFATE 2 MILLIGRAM(S): 50 CAPSULE, EXTENDED RELEASE ORAL at 22:08

## 2023-06-22 NOTE — PROGRESS NOTE ADULT - SUBJECTIVE AND OBJECTIVE BOX
Pt seen and examined at bedside. Complaining of similar diffuse LUE pain and R finger pain from last night. No new complaints overnight.    Vital Signs (24 Hrs):  T(C): 37.2 (06-22-23 @ 05:10), Max: 39.3 (06-21-23 @ 13:31)  HR: 83 (06-22-23 @ 05:10) (79 - 101)  BP: 149/91 (06-22-23 @ 05:10) (114/74 - 163/102)  RR: 19 (06-22-23 @ 05:10) (16 - 19)  SpO2: 99% (06-22-23 @ 05:10) (97% - 100%)  Wt(kg): --    LABS:                          8.8    5.73  )-----------( 134      ( 21 Jun 2023 06:06 )             25.5     06-21    132<L>  |  103  |  12  ----------------------------<  110<H>  3.9   |  27  |  1.01    Ca    8.5      21 Jun 2023 06:06  Phos  2.1     06-21  Mg     1.8     06-21        hysical Exam  Gen: NAD, awake and alert.    RUE: middle finger swelling noted.  Skin intact, +TTP throughout MF, no bony ttp elsewhere throughout RUE, compartments soft/compressive, extremity warm/well perfused. No elbow or shoulder tenderness or swelling. 2+ radial pulse, +Ax/Musc/Med/Uln/Rad/N/PIN, SILT C5-T1    LUE: Mild elbow swelling noted.  Skin intact, no erythema, +no pain with micromotion of elbow or shoulder, experiences pain at terminal flexion and extension of elbow (AROM is 20-90, PROM is 5-100). TTP by olecranon tip, no bony ttp elsewhere throughout RUE, compartments soft/compressive, extremity warm/well perfused.2+ radial pulse, +Ax/Musc/Med/Uln/Rad/N/PIN, SILT C5-T1        Imaging:  Xray elbow/shoulder: no obvious fractures or dislocations. Soft tissue swelling noted by the elbow.  BLLUE Duplex: Superficial venous thrombosis/thrombophlebitis of the left basilic vein. No evidence of deep venous thrombosis in either upper extremity.    A/P: 43y Male with L elbow pain, likely due to non-septic olecranon bursitis; pt also likely has a fracture of the R middle finger    Pt likely does not have septic joint infections at this time given the lack of micromotion in all the joints and able to move the joints relatively well despite having pain for over a week. Olecranon bursa is slightly swollen and tender to touch, but not overly erythematic with gross palpable effusions. Any other arm or shoulder pain may be more related to the superficial basilic vein thrombophlebitis.     -Recommended to start warm compresses and elevation of the L elbow. No need to aspirate at this time.  -R middle finger should have x-rays performed with hand consultation (plastic surgery at [facility]). Defer to hand consultation for further recommendations for R middle finger  -Would recommend ID consults for further infectious workup as fevers likely not due to septic joint at this time.    -FU blood and urine cultures  -Pain control with anti-inflammatories for the probable aspetic bursitis   -WBAT of LUE; would likely keep R hand NWB, but definitive recommendations must be made by the hand team  -Medical management and DVT ppx per primary team  -No indication for acute orthopedic surgical intervention regarding the L elbow or shoulder  -Will discuss above with Dr. Mayes and adjust plan as needed   Pt seen and examined at bedside. Complaining of similar diffuse LUE pain and R finger pain from last night. No new complaints overnight.    Vital Signs (24 Hrs):  T(C): 37.2 (06-22-23 @ 05:10), Max: 39.3 (06-21-23 @ 13:31)  HR: 83 (06-22-23 @ 05:10) (79 - 101)  BP: 149/91 (06-22-23 @ 05:10) (114/74 - 163/102)  RR: 19 (06-22-23 @ 05:10) (16 - 19)  SpO2: 99% (06-22-23 @ 05:10) (97% - 100%)  Wt(kg): --    LABS:                          8.8    5.73  )-----------( 134      ( 21 Jun 2023 06:06 )             25.5     06-21    132<L>  |  103  |  12  ----------------------------<  110<H>  3.9   |  27  |  1.01    Ca    8.5      21 Jun 2023 06:06  Phos  2.1     06-21  Mg     1.8     06-21        hysical Exam  Gen: NAD, awake and alert.    RUE: middle finger swelling noted.  Skin intact, +TTP throughout MF, no bony ttp elsewhere throughout RUE, compartments soft/compressive, extremity warm/well perfused. No elbow or shoulder tenderness or swelling. 2+ radial pulse, +Ax/Musc/Med/Uln/Rad/N/PIN, SILT C5-T1    LUE: Mild elbow swelling noted.  Skin intact, no erythema, +no pain with micromotion of elbow or shoulder, experiences pain at terminal flexion and extension of elbow (AROM is 20-90, PROM is 5-100). TTP by olecranon tip, no bony ttp elsewhere throughout RUE, compartments soft/compressive, extremity warm/well perfused.2+ radial pulse, +Ax/Musc/Med/Uln/Rad/N/PIN, SILT C5-T1        Imaging:  Xray elbow/shoulder: no obvious fractures or dislocations. Soft tissue swelling noted by the elbow.  BLLUE Duplex: Superficial venous thrombosis/thrombophlebitis of the left basilic vein. No evidence of deep venous thrombosis in either upper extremity.    A/P: 43y Male with L elbow pain, likely due to non-septic olecranon bursitis; pt also likely has a fracture of the R middle finger    Pt likely does not have septic joint infections at this time given the lack of micromotion in all the joints and able to move the joints relatively well despite having pain for over a week. Olecranon bursa is slightly swollen and tender to touch, but not overly erythematic with gross palpable effusions. Any other arm or shoulder pain may be more related to the superficial basilic vein thrombophlebitis.     -Recommended to start warm compresses and elevation of the L elbow. No need to aspirate at this time.  -R middle finger should have x-rays performed with hand consultation (plastic surgery at [facility]). Defer to hand consultation for further recommendations for R middle finger  -Would recommend ID consults for further infectious workup as fevers likely not due to septic joint at this time.    -FU blood and urine cultures  -Pain control with anti-inflammatories for the probable aspetic bursitis   -WBAT of LUE; would likely keep R hand NWB, but definitive recommendations must be made by the hand team  -Medical management and DVT ppx per primary team  -Will attempt L elbow aspiration today  -Will discuss above with Dr. Mayes and adjust plan as needed   Pt seen and examined at bedside. Complaining of similar diffuse LUE pain and R finger pain from last night. No new complaints overnight.    Vital Signs (24 Hrs):  T(C): 37.2 (06-22-23 @ 05:10), Max: 39.3 (06-21-23 @ 13:31)  HR: 83 (06-22-23 @ 05:10) (79 - 101)  BP: 149/91 (06-22-23 @ 05:10) (114/74 - 163/102)  RR: 19 (06-22-23 @ 05:10) (16 - 19)  SpO2: 99% (06-22-23 @ 05:10) (97% - 100%)  Wt(kg): --    LABS:                          8.8    5.73  )-----------( 134      ( 21 Jun 2023 06:06 )             25.5     06-21    132<L>  |  103  |  12  ----------------------------<  110<H>  3.9   |  27  |  1.01    Ca    8.5      21 Jun 2023 06:06  Phos  2.1     06-21  Mg     1.8     06-21        Physical Exam  Gen: NAD, awake and alert.    RUE: middle finger swelling noted.  Skin intact, +TTP throughout MF, no bony ttp elsewhere throughout RUE, compartments soft/compressive, extremity warm/well perfused. No elbow or shoulder tenderness or swelling. 2+ radial pulse, +Ax/Musc/Med/Uln/Rad/N/PIN, SILT C5-T1    LUE: Mild elbow swelling noted.  Skin intact, no erythema, +no pain with micromotion of elbow or shoulder, experiences pain at terminal flexion and extension of elbow (AROM is 20-90, PROM is 5-100). TTP by olecranon tip, no bony ttp elsewhere throughout RUE, compartments soft/compressive, extremity warm/well perfused.2+ radial pulse, +Ax/Musc/Med/Uln/Rad/N/PIN, SILT C5-T1        Imaging:  Xray elbow/shoulder: no obvious fractures or dislocations. Soft tissue swelling noted by the elbow.  BLLUE Duplex: Superficial venous thrombosis/thrombophlebitis of the left basilic vein. No evidence of deep venous thrombosis in either upper extremity.    A/P: 43y Male with L elbow pain, likely due to non-septic olecranon bursitis; pt also likely has a fracture of the R middle finger    Pt likely does not have septic joint infections at this time given the lack of micromotion in all the joints and able to move the joints relatively well despite having pain for over a week. Olecranon bursa is slightly swollen and tender to touch, but not overly erythematic with gross palpable effusions. Any other arm or shoulder pain may be more related to the superficial basilic vein thrombophlebitis.     -Recommended to start warm compresses and elevation of the L elbow. No need to aspirate at this time.  -R middle finger should have x-rays performed with hand consultation (plastic surgery at [facility]). Defer to hand consultation for further recommendations for R middle finger  -Would recommend ID consults for further infectious workup as fevers likely not due to septic joint at this time.    -FU blood and urine cultures  -Pain control with anti-inflammatories for the probable aspetic bursitis   -WBAT of LUE; would likely keep R hand NWB, but definitive recommendations must be made by the hand team  -Medical management and DVT ppx per primary team  -Will attempt L elbow aspiration today  -Will discuss above with Dr. Mayes and adjust plan as needed

## 2023-06-22 NOTE — PROGRESS NOTE ADULT - PROBLEM SELECTOR PLAN 2
Reports injury at work  Xray R hand ordered f/u   suspect gout f/u uric acid , esr ,crp  6/22/2023 uric acid wnl   esr and crp elevated  f/u xray r/o fracture    may need ct scan and hand surgeon    appreciate ortho consult

## 2023-06-22 NOTE — PROGRESS NOTE ADULT - PROBLEM SELECTOR PLAN 3
Pt complaint L shoulder, elbow and wrist pain. Fingers are swollen  F/u CK to r/o rhabdo-->94  F/u doppler upper extremities b/l to r/o DVT--> superficial thrombus left upper extremity . elevate arm supportive care  F/u Xray L wrist, elbow and shoulder to r/o bone pathology   check uric acid, f/u esr crp for suspicion of gout  my colleague added ibuprofen PRN for moderate pain  6/22/2023 - uric acid wnl, f/u xrays   consulted rheum

## 2023-06-22 NOTE — PROGRESS NOTE ADULT - PROBLEM SELECTOR PLAN 4
per my colleague's documentation "Chronic unstable  questionable seizure yesterday   [facility] in AM - pt gave pharmacy "[facility] in [facility]", called but they said only ibuprofen, rosuvastatin 20mg daily and vit D2, last  was 2022. still unclear.   [facility] protocol as above   Seizure precautions"    6/21/2023 will get eeg   ct head noted

## 2023-06-22 NOTE — CHART NOTE - NSCHARTNOTEFT_GEN_A_CORE
Attempted to aspirate L elbow joint on 6/22/23 but tap was completely dry. Patient has multiple joint and muscle pains for several days. Given exam findings of lack of micromotion, dry tap, and timeline of symptoms, an acute septic joint infection is unlikely at this point. Would recommend exploring other possible infectious or non-infectious inflammatory etiologies given pt's recent fevers and elevated inflammatory markers. At this point in time, there is no further acute orthopedic surgical intervention indicated. Reconsult orthopedic surgery if there any changes in the pt's musculoskeletal exam that would point towards development of septic joint infection. Discussed above with Dr. Mayes, who agrees with plan.

## 2023-06-22 NOTE — PROGRESS NOTE ADULT - SUBJECTIVE AND OBJECTIVE BOX
Patient is a 43y old  Male who presents with a chief complaint of alcohol withdrawal, MARTY (19 Jun 2023 17:06)      INTERVAL HPI/OVERNIGHT EVENTS:    MEDICATIONS  (STANDING):  cloNIDine 0.2 milliGRAM(s) Oral three times a day  enoxaparin Injectable 40 milliGRAM(s) SubCutaneous every 24 hours  folic acid 1 milliGRAM(s) Oral daily  LORazepam   Injectable 1 milliGRAM(s) IV Push every 12 hours  LORazepam   Injectable   IV Push   multivitamin 1 Tablet(s) Oral daily  thiamine IVPB 500 milliGRAM(s) IV Intermittent three times a day    MEDICATIONS  (PRN):  acetaminophen     Tablet .. 650 milliGRAM(s) Oral every 6 hours PRN Temp greater or equal to 38C (100.4F), Mild Pain (1 - 3)  aluminum hydroxide/magnesium hydroxide/simethicone Suspension 30 milliLiter(s) Oral every 4 hours PRN Dyspepsia  ibuprofen  Tablet. 400 milliGRAM(s) Oral every 8 hours PRN Moderate Pain (4 - 6)  LORazepam   Injectable 2 milliGRAM(s) IV Push every 1 hour PRN Symptom-triggered: each CIWA -Ar score 8 or GREATER  melatonin 3 milliGRAM(s) Oral at bedtime PRN Insomnia  morphine  - Injectable 2 milliGRAM(s) IV Push every 6 hours PRN Severe Pain (7 - 10)  ondansetron Injectable 4 milliGRAM(s) IV Push every 8 hours PRN Nausea and/or Vomiting      Vital Signs Last 24 Hrs  T(C): 37.1 (22 Jun 2023 10:41), Max: 39.3 (21 Jun 2023 13:31)  T(F): 98.8 (22 Jun 2023 10:41), Max: 102.8 (21 Jun 2023 13:31)  HR: 86 (22 Jun 2023 10:41) (79 - 98)  BP: 193/110 (22 Jun 2023 10:41) (114/74 - 193/110)  BP(mean): --  RR: 18 (22 Jun 2023 10:41) (16 - 19)  SpO2: 100% (22 Jun 2023 10:41) (97% - 100%)    Parameters below as of 22 Jun 2023 10:41  Patient On (Oxygen Delivery Method): room air        PHYSICAL EXAM:  GENERAL: NAD, well-groomed, well-developed  HEAD:  Atraumatic, Normocephalic  EYES: EOMI, conjunctiva and sclera clear  ENMT: No tonsillar erythema, exudates, or enlargement; Moist mucous membranes  NECK: Supple   NERVOUS SYSTEM:  Alert & Oriented X3, Good concentration; Moving all extremities  CHEST/LUNG:  No rales, rhonchi, wheezing, or rubs  HEART: Regular rate and rhythm; No murmurs, rubs, or gallops  ABDOMEN: Soft, Nontender, Nondistended; Bowel sounds present  EXTREMITIES:  2+ Peripheral Pulses, No clubbing, cyanosis, + swelling in hand joints,  more on right middle finger , left ankle warm and swelling and tender left upper extremity swollen and elbow warm,  right knee warm tender  SKIN: No rashes or lesions    LABS:                            8.3    5.49  )-----------( 180      ( 22 Jun 2023 07:20 )             24.4   06-22    134<L>  |  104  |  12  ----------------------------<  105<H>  3.7   |  26  |  1.00    Ca    8.4<L>      22 Jun 2023 07:20  Phos  3.3     06-22  Mg     2.2     06-22      Creatine Kinase, Serum (06.20.23 @ 05:30)    Creatine Kinase, Serum: 94 U/L

## 2023-06-23 LAB
ANION GAP SERPL CALC-SCNC: 4 MMOL/L — LOW (ref 5–17)
BUN SERPL-MCNC: 12 MG/DL — SIGNIFICANT CHANGE UP (ref 7–23)
CALCIUM SERPL-MCNC: 9.1 MG/DL — SIGNIFICANT CHANGE UP (ref 8.5–10.1)
CHLORIDE SERPL-SCNC: 102 MMOL/L — SIGNIFICANT CHANGE UP (ref 96–108)
CO2 SERPL-SCNC: 25 MMOL/L — SIGNIFICANT CHANGE UP (ref 22–31)
CREAT SERPL-MCNC: 0.99 MG/DL — SIGNIFICANT CHANGE UP (ref 0.5–1.3)
CRP SERPL-MCNC: 293 MG/L — HIGH
EGFR: 98 ML/MIN/1.73M2 — SIGNIFICANT CHANGE UP
ERYTHROCYTE [SEDIMENTATION RATE] IN BLOOD: 115 MM/HR — HIGH (ref 0–15)
GLUCOSE SERPL-MCNC: 115 MG/DL — HIGH (ref 70–99)
HCT VFR BLD CALC: 24.7 % — LOW (ref 39–50)
HGB BLD-MCNC: 8.5 G/DL — LOW (ref 13–17)
MAGNESIUM SERPL-MCNC: 1.7 MG/DL — SIGNIFICANT CHANGE UP (ref 1.6–2.6)
MCHC RBC-ENTMCNC: 34.4 G/DL — SIGNIFICANT CHANGE UP (ref 32–36)
MCHC RBC-ENTMCNC: 34.4 PG — HIGH (ref 27–34)
MCV RBC AUTO: 100 FL — SIGNIFICANT CHANGE UP (ref 80–100)
NRBC # BLD: 0 /100 WBCS — SIGNIFICANT CHANGE UP (ref 0–0)
PHOSPHATE SERPL-MCNC: 2.9 MG/DL — SIGNIFICANT CHANGE UP (ref 2.5–4.5)
PLATELET # BLD AUTO: 252 K/UL — SIGNIFICANT CHANGE UP (ref 150–400)
POTASSIUM SERPL-MCNC: 4.2 MMOL/L — SIGNIFICANT CHANGE UP (ref 3.5–5.3)
POTASSIUM SERPL-SCNC: 4.2 MMOL/L — SIGNIFICANT CHANGE UP (ref 3.5–5.3)
RBC # BLD: 2.47 M/UL — LOW (ref 4.2–5.8)
RBC # FLD: 14.3 % — SIGNIFICANT CHANGE UP (ref 10.3–14.5)
SODIUM SERPL-SCNC: 131 MMOL/L — LOW (ref 135–145)
WBC # BLD: 6.78 K/UL — SIGNIFICANT CHANGE UP (ref 3.8–10.5)
WBC # FLD AUTO: 6.78 K/UL — SIGNIFICANT CHANGE UP (ref 3.8–10.5)

## 2023-06-23 PROCEDURE — 99222 1ST HOSP IP/OBS MODERATE 55: CPT

## 2023-06-23 PROCEDURE — 93970 EXTREMITY STUDY: CPT | Mod: 26

## 2023-06-23 PROCEDURE — 99223 1ST HOSP IP/OBS HIGH 75: CPT

## 2023-06-23 PROCEDURE — 99232 SBSQ HOSP IP/OBS MODERATE 35: CPT

## 2023-06-23 PROCEDURE — 93306 TTE W/DOPPLER COMPLETE: CPT | Mod: 26

## 2023-06-23 RX ADMIN — Medication 650 MILLIGRAM(S): at 16:52

## 2023-06-23 RX ADMIN — Medication 1 MILLIGRAM(S): at 12:26

## 2023-06-23 RX ADMIN — Medication 0.6 MILLIGRAM(S): at 12:26

## 2023-06-23 RX ADMIN — ENOXAPARIN SODIUM 40 MILLIGRAM(S): 100 INJECTION SUBCUTANEOUS at 05:23

## 2023-06-23 RX ADMIN — Medication 1 TABLET(S): at 12:27

## 2023-06-23 RX ADMIN — Medication 105 MILLIGRAM(S): at 05:23

## 2023-06-23 RX ADMIN — Medication 40 MILLIGRAM(S): at 14:39

## 2023-06-23 RX ADMIN — Medication 0.3 MILLIGRAM(S): at 05:23

## 2023-06-23 RX ADMIN — Medication 0.3 MILLIGRAM(S): at 14:38

## 2023-06-23 RX ADMIN — Medication 105 MILLIGRAM(S): at 14:38

## 2023-06-23 RX ADMIN — Medication 0.5 MILLIGRAM(S): at 05:22

## 2023-06-23 RX ADMIN — Medication 0.3 MILLIGRAM(S): at 21:34

## 2023-06-23 NOTE — CONSULT NOTE ADULT - SUBJECTIVE AND OBJECTIVE BOX
Pt seen and examined.  Full consult to follow. HISTORY OF PRESENT ILLNESS:    Patient is a 43y Male    HPI:  43 yr old male with a pmh of alcohol dependence recently presents per ED with tremors with his last drink being 4 days ago. Per triage note pt reports a fall last night with head injury. 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   History partially limited as pt received benzo's prior to questioning and requires redirection frequently.  Denies  headache, dizziness, chest pain, palpitations, SOB, abdominal pain, joint pain, diarrhea/constipation, urinary symptoms.   Vitals: T 99.3, , /99, RR 16 satting 100% RA (17 Jun 2023 20:57)  Pt also c/o severe diffuse pains, worst in his left shoulder, left elbow, and entire left forearm and hand.  The pain is constant - occurs both with activity and at rest.  (+)swelling of the left hand.  He also c/o pain/swelling of his right middle finger - he says he injured out about 2 months ago, but the pain/swelling have persisted since then.      PAST MEDICAL & SURGICAL HISTORY:  Seizures      Hypertension      No significant past surgical history          ROS: No oral ulcers, rashes, joint pain, alopecia, photosensitivity, dry eye/dry mouth, Raynaud's, dysphagia, focal weakness, or eye symptoms.  No personal/family hx pf psoriasis.  No hx of uveitis. No nail pitting.  (+)back pain, though not c/w inflammatory.    MEDICATIONS  (STANDING):  cloNIDine 0.3 milliGRAM(s) Oral every 8 hours  colchicine 0.6 milliGRAM(s) Oral daily  enoxaparin Injectable 40 milliGRAM(s) SubCutaneous every 24 hours  folic acid 1 milliGRAM(s) Oral daily  multivitamin 1 Tablet(s) Oral daily  thiamine IVPB 500 milliGRAM(s) IV Intermittent three times a day    MEDICATIONS  (PRN):  acetaminophen     Tablet .. 650 milliGRAM(s) Oral every 6 hours PRN Temp greater or equal to 38C (100.4F), Mild Pain (1 - 3)  aluminum hydroxide/magnesium hydroxide/simethicone Suspension 30 milliLiter(s) Oral every 4 hours PRN Dyspepsia  ibuprofen  Tablet. 400 milliGRAM(s) Oral every 8 hours PRN Moderate Pain (4 - 6)  LORazepam   Injectable 2 milliGRAM(s) IV Push every 1 hour PRN Symptom-triggered: each CIWA -Ar score 8 or GREATER  melatonin 3 milliGRAM(s) Oral at bedtime PRN Insomnia  morphine  - Injectable 2 milliGRAM(s) IV Push every 6 hours PRN Severe Pain (7 - 10)  ondansetron Injectable 4 milliGRAM(s) IV Push every 8 hours PRN Nausea and/or Vomiting      Allergies    No Known Allergies    Intolerances        FAMILY HISTORY:  No pertinent family history in first degree relatives        SOCIAL HISTORY:  Tobacco--   none            Vital Signs Last 24 Hrs  T(C): 37.3 (23 Jun 2023 10:46), Max: 38.1 (22 Jun 2023 16:00)  T(F): 99.2 (23 Jun 2023 10:46), Max: 100.6 (22 Jun 2023 16:00)  HR: 89 (23 Jun 2023 10:46) (86 - 97)  BP: 117/74 (23 Jun 2023 10:46) (117/74 - 187/110)  BP(mean): --  RR: 18 (23 Jun 2023 10:46) (18 - 19)  SpO2: 98% (23 Jun 2023 10:46) (95% - 99%)    Parameters below as of 23 Jun 2023 10:46  Patient On (Oxygen Delivery Method): room air        PHYSICAL EXAM:  General :  NAD  HEENT--  no oral ulcers  Nodes-- no  LAD  Lungs--  CTA B/L  Heart--  RRR, nlS1 &S2 normal;   Abdomen--  soft, NT, ND +BS  Skin:  no rashes  Musculoskeletal exam:  (+)swelling of left hand;  (+)diffuse tenderness throughout left hand and forearm;  left elbow w/ (+)tenderness, (+)pain upon flexion/extension;  left shoulder w/ pain upon movement in all planes;  (+)swelling of right middle finger (?dactylitis);  left ankle w/ (+)swelling/tenderness, pain upon movement in all planes    LABS:                        8.5    6.78  )-----------( 252      ( 23 Jun 2023 07:17 )             24.7     06-23    131<L>  |  102  |  12  ----------------------------<  115<H>  4.2   |  25  |  0.99    Ca    9.1      23 Jun 2023 07:17  Phos  2.9     06-23  Mg     1.7     06-23        Urinalysis Basic - ( 23 Jun 2023 07:17 )    Color: x / Appearance: x / SG: x / pH: x  Gluc: 115 mg/dL / Ketone: x  / Bili: x / Urobili: x   Blood: x / Protein: x / Nitrite: x   Leuk Esterase: x / RBC: x / WBC x   Sq Epi: x / Non Sq Epi: x / Bacteria: x    Rheumatoid Factor Quant, Serum or Plasma in AM (06.22.23 @ 07:20)    Rheumatoid Factor Quant, Serum or Plasma: 26 IU/mL    Sedimentation Rate, Erythrocyte (06.23.23 @ 07:17)    Sedimentation Rate, Erythrocyte: 115 mm/hr    C-Reactive Protein, Serum (06.21.23 @ 14:37)    C-Reactive Protein, Serum: 256 mg/L        RADIOLOGY & ADDITIONAL STUDIES:

## 2023-06-23 NOTE — PROGRESS NOTE ADULT - PROBLEM SELECTOR PLAN 2
Reports injury at work  Xray R hand ordered f/u   suspect gout f/u uric acid , esr ,crp  6/22/2023 uric acid wnl   esr and crp elevated  f/u xray r/o fracture    may need ct scan and hand surgeon    appreciate ortho consult  6/23/2023 await rheum eval/consult   case also d/w dr. lee ( hand surgeon) reccs mri hand   will consult ID consult   continue with colchicine , consider low dose steroids , blood cx negative. get echo

## 2023-06-23 NOTE — CONSULT NOTE ADULT - SUBJECTIVE AND OBJECTIVE BOX
Full note to follow  Polyarthritis- L foot, R knee, both wrists. multiple fingers, both elbows  Suspect polyarticular gout in the setting of EtOH consumption and probable EtOH withdrawal seizure  Has had similar episodes before but none as severe as this one  Doubt SLE or RA. Denies any family history of autoimmune disease.  Doubt porphyria- would expect neuropathy, not polyarthritis  Blood cx NTD  No objection to steroids  Thank you for the courtesy of this referral.  I will be available via Teams for any issues that may arise over the weekend.    Judson Velasquez MD  Attending Physician  [facility]  Division of Infectious Diseases  [phone]  ====================  [facility]  [phone]    Name: SYDNEE GREEN  Age: 43y  Gender: Male  MRN: [id number]    Interval History--  Notes reviewed.     Past Medical History--  Seizures    Hypertension    No significant past surgical history        For details regarding the patient's social history, family history, and other miscellaneous elements, please refer the initial infectious diseases consultation and/or the admitting history and physical examination for this admission.    Allergies    No Known Allergies    Intolerances        Medications--  Antibiotics:    Immunologic:    Other:  acetaminophen     Tablet .. PRN  aluminum hydroxide/magnesium hydroxide/simethicone Suspension PRN  cloNIDine  colchicine  enoxaparin Injectable  folic acid  ibuprofen  Tablet. PRN  LORazepam   Injectable PRN  melatonin PRN  morphine  - Injectable PRN  multivitamin  ondansetron Injectable PRN  predniSONE   Tablet      Review of Systems--  A 10-point review of systems was obtained.     Pertinent positives and negatives--  Constitutional: No fevers. No Chills. No Rigors.   Cardiovascular: No chest pain. No palpitations.  Respiratory: No shortness of breath. No cough.  Gastrointestinal: No nausea or vomiting. No diarrhea or constipation.   Psychiatric: Pleasant. Appropriate affect.    Review of systems otherwise negative except as previously noted.    Physical Examination--  Vital Signs: T(F): 99.2 (06-23-23 @ 10:46), Max: 100 (06-22-23 @ 17:59)  HR: 89 (06-23-23 @ 10:46)  BP: 117/74 (06-23-23 @ 10:46)  RR: 18 (06-23-23 @ 10:46)  SpO2: 98% (06-23-23 @ 10:46)  Wt(kg): --  General: Nontoxic-appearing Male in no acute distress.  HEENT: AT/NC. PERRL. EOMI. Anicteric. Conjunctiva pink and moist. Oropharynx clear. Dentition fair.  Neck: Not rigid. No sense of mass.  Nodes: None palpable.  Lungs: Clear bilaterally without rales, wheezing or rhonchi  Heart: Regular rate and rhythm. No Murmur. No rub. No gallop. No palpable thrill.  Abdomen: Bowel sounds present and normoactive. Soft. Nondistended. Nontender. No sense of mass. No organomegaly.  Back: No spinal tenderness. No costovertebral angle tenderness.   Extremities: No cyanosis or clubbing. No edema.   Skin: Warm. Dry. Good turgor. No rash. No vasculitic stigmata.  Psychiatric: Appropriate affect and mood for situation.         Laboratory Studies--  CBC                        8.5    6.78  )-----------( 252      ( 23 Jun 2023 07:17 )             24.7       Chemistries  06-23    131<L>  |  102  |  12  ----------------------------<  115<H>  4.2   |  25  |  0.99    Ca    9.1      23 Jun 2023 07:17  Phos  2.9     06-23  Mg     1.7     06-23        Culture Data  Culture - Blood (collected 21 Jun 2023 15:07)  Source: .Blood Blood-Peripheral  Preliminary Report (22 Jun 2023 19:02):    No growth to date.    Culture - Blood (collected 21 Jun 2023 14:37)  Source: .Blood Blood-Peripheral  Preliminary Report (22 Jun 2023 19:02):    No growth to date.    Culture - Urine (collected 21 Jun 2023 13:45)  Source: Clean Catch Clean Catch (Midstream)  Preliminary Report (23 Jun 2023 08:00):    >100,000 CFU/ml Gram Negative Rods             Full note to follow  Polyarthritis- L foot, R knee, both wrists. multiple fingers, both elbows  Suspect polyarticular gout in the setting of EtOH consumption and probable EtOH withdrawal seizure  Has had similar episodes before but none as severe as this one  Doubt SLE or RA. Denies any family history of autoimmune disease.  Doubt porphyria- would expect neuropathy, not polyarthritis  Blood cx NTD  No objection to steroids  Thank you for the courtesy of this referral.  I will be available via Teams for any issues that may arise over the weekend.    Judson Velasquez MD  Attending Physician  [facility]  Division of Infectious Diseases  [phone]  ====================  [facility]  Division of Infectious Diseases  [phone]    SYDNEE GREEN  43y, Male  [id number]    HPI--  This is a 43-year-old man admitted with seizures.  He is a prior history of seizures, per his report in the setting of consuming alcohol.  He was drinking heavily up until about 3 to 4 days prior to admission.  He was admitted with delirium tremens.  The patient's had fever through the hospital stay, and now has developed polyarthritis.  Discussion with the patient he has had this problem before several times.  It is usually after drinking alcohol.  Sometimes the patient thought that it would just be because he fell and did not remember that he had fallen.  The patient denies any history of rash or change in his urine color.  He has no history personally or in the family of any autoimmune disease such as lupus or rheumatoid arthritis.  Grandmother had arthritis, but it sounds as if it was probably just osteoarthritis based on the description.    PMH/PSH--  Seizures    Hypertension    No significant past surgical history        Allergies--  No Known Allergies      Medications--  Antibiotics:    Immunologic:    Other:  acetaminophen     Tablet .. PRN  aluminum hydroxide/magnesium hydroxide/simethicone Suspension PRN  cloNIDine  colchicine  enoxaparin Injectable  folic acid  ibuprofen  Tablet. PRN  LORazepam   Injectable PRN  melatonin PRN  morphine  - Injectable PRN  multivitamin  ondansetron Injectable PRN  predniSONE   Tablet      Review of Systems--  A 10-point review of systems was obtained.   Review of systems otherwise negative except as previously noted.    Physical Examination--  Vital Signs: T(F): 99.2 (06-23-23 @ 10:46), Max: 100 (06-22-23 @ 17:59)  HR: 89 (06-23-23 @ 10:46)  BP: 117/74 (06-23-23 @ 10:46)  RR: 18 (06-23-23 @ 10:46)  SpO2: 98% (06-23-23 @ 10:46)  Wt(kg): --  General: Nontoxic-appearing Male in no acute distress.  HEENT: AT/NC. Anicteric. Conjunctiva pink and moist. Oropharynx clear.  Neck: Not rigid. No sense of mass.  Nodes: None palpable.  Lungs: Clear bilaterally without rales, wheezing or rhonchi  Heart: Regular rate and rhythm.  Abdomen: Bowel sounds present and normoactive. Soft. Nondistended. Nontender.   Extremities: No cyanosis or clubbing. Inflammation involving left foot/ankle, right knee, both wrists, multiple MCP/PIP joints in the hands, both elbows. There is exquisite passive range of motion tenderness with local edema, redness, and increased calor.  Skin: Warm. Dry. Good turgor. No rash. No vasculitic stigmata.  Psychiatric: Appropriate affect and mood for situation.         Laboratory Studies--  CBC                        8.5    6.78  )-----------( 252      ( 23 Jun 2023 07:17 )             24.7         Chemistries  06-23    131<L>  |  102  |  12  ----------------------------<  115<H>  4.2   |  25  |  0.99    Ca    9.1      23 Jun 2023 07:17  Phos  2.9     06-23  Mg     1.7     06-23      Culture Data  Culture - Blood (collected 21 Jun 2023 15:07)  Source: .Blood Blood-Peripheral  Preliminary Report (22 Jun 2023 19:02):    No growth to date.    Culture - Blood (collected 21 Jun 2023 14:37)  Source: .Blood Blood-Peripheral  Preliminary Report (22 Jun 2023 19:02):    No growth to date.    Culture - Urine (collected 21 Jun 2023 13:45)  Source: Clean Catch Clean Catch (Midstream)  Preliminary Report (23 Jun 2023 08:00):    >100,000 CFU/ml Gram Negative Rods    Duplex ultrasound no DVT in the lower extremities or upper extremities    Echocardiogram with diastolic dysfunction

## 2023-06-23 NOTE — PROGRESS NOTE ADULT - SUBJECTIVE AND OBJECTIVE BOX
Patient is a 43y old  Male who presents with a chief complaint of alcohol withdrawal, MARTY (19 Jun 2023 17:06)      INTERVAL HPI/OVERNIGHT EVENTS:      MEDICATIONS  (STANDING):  cloNIDine 0.3 milliGRAM(s) Oral every 8 hours  colchicine 0.6 milliGRAM(s) Oral daily  enoxaparin Injectable 40 milliGRAM(s) SubCutaneous every 24 hours  folic acid 1 milliGRAM(s) Oral daily  LORazepam   Injectable   IV Push   LORazepam   Injectable 0.5 milliGRAM(s) IV Push every 12 hours  multivitamin 1 Tablet(s) Oral daily  thiamine IVPB 500 milliGRAM(s) IV Intermittent three times a day    MEDICATIONS  (PRN):  acetaminophen     Tablet .. 650 milliGRAM(s) Oral every 6 hours PRN Temp greater or equal to 38C (100.4F), Mild Pain (1 - 3)  aluminum hydroxide/magnesium hydroxide/simethicone Suspension 30 milliLiter(s) Oral every 4 hours PRN Dyspepsia  ibuprofen  Tablet. 400 milliGRAM(s) Oral every 8 hours PRN Moderate Pain (4 - 6)  LORazepam   Injectable 2 milliGRAM(s) IV Push every 1 hour PRN Symptom-triggered: each CIWA -Ar score 8 or GREATER  melatonin 3 milliGRAM(s) Oral at bedtime PRN Insomnia  morphine  - Injectable 2 milliGRAM(s) IV Push every 6 hours PRN Severe Pain (7 - 10)  ondansetron Injectable 4 milliGRAM(s) IV Push every 8 hours PRN Nausea and/or Vomiting    Vital Signs Last 24 Hrs  T(C): 37.3 (23 Jun 2023 10:46), Max: 38.1 (22 Jun 2023 16:00)  T(F): 99.2 (23 Jun 2023 10:46), Max: 100.6 (22 Jun 2023 16:00)  HR: 89 (23 Jun 2023 10:46) (86 - 97)  BP: 117/74 (23 Jun 2023 10:46) (117/74 - 187/110)  BP(mean): --  RR: 18 (23 Jun 2023 10:46) (18 - 19)  SpO2: 98% (23 Jun 2023 10:46) (95% - 99%)    Parameters below as of 23 Jun 2023 10:46  Patient On (Oxygen Delivery Method): room air            PHYSICAL EXAM:  GENERAL: NAD, well-groomed, well-developed  HEAD:  Atraumatic, Normocephalic  EYES: EOMI, conjunctiva and sclera clear  ENMT: No tonsillar erythema, exudates, or enlargement; Moist mucous membranes  NECK: Supple   NERVOUS SYSTEM:  Alert & Oriented X3, Good concentration; Moving all extremities  CHEST/LUNG:  No rales, rhonchi, wheezing, or rubs  HEART: Regular rate and rhythm; No murmurs, rubs, or gallops  ABDOMEN: Soft, Nontender, Nondistended; Bowel sounds present  EXTREMITIES:  2+ Peripheral Pulses, No clubbing, cyanosis, + swelling in hand joints,  more on right middle finger , left ankle warm and swelling and tender left upper extremity swollen and elbow warm,  right knee warm tender  SKIN: No rashes or lesions    LABS:                            8.5    6.78  )-----------( 252      ( 23 Jun 2023 07:17 )             24.7   06-23    131<L>  |  102  |  12  ----------------------------<  115<H>  4.2   |  25  |  0.99    Ca    9.1      23 Jun 2023 07:17  Phos  2.9     06-23  Mg     1.7     06-23        Culture - Blood (collected 21 Jun 2023 15:07)  Source: .Blood Blood-Peripheral  Preliminary Report (22 Jun 2023 19:02):    No growth to date.    Culture - Blood (collected 21 Jun 2023 14:37)  Source: .Blood Blood-Peripheral  Preliminary Report (22 Jun 2023 19:02):    No growth to date.    Culture - Urine (collected 21 Jun 2023 13:45)  Source: Clean Catch Clean Catch (Midstream)  Preliminary Report (23 Jun 2023 08:00):    >100,000 CFU/ml Gram Negative Rods      Creatine Kinase, Serum (06.20.23 @ 05:30)    Creatine Kinase, Serum: 94 U/L      RAD:  < from: US Duplex Venous Lower Ext Complete, Bilateral (06.23.23 @ 09:20) >  IMPRESSION:  No evidence of deep venous thrombosis in either lower extremity.    < end of copied text >  < from: Xray Hand 2 Views, Right (06.22.23 @ 12:42) >  IMPRESSION:    No acute radiographic findings.    < end of copied text >  < from: Xray Chest 1 View- PORTABLE-Urgent (Xray Chest 1 View- PORTABLE-Urgent .) (06.21.23 @ 21:28) >    Impression:    No acute pulmonary disease.    < end of copied text >  < from: Xray Wrist 3 Views, Left (06.20.23 @ 17:43) >  IMPRESSION:    No acute radiographic findings.    < end of copied text >  < from: Xray Shoulder 2 Views, Left (06.20.23 @ 17:42) >  IMPRESSION:    No acute radiographic findings.    < end of copied text >

## 2023-06-23 NOTE — PROGRESS NOTE ADULT - PROBLEM SELECTOR PLAN 4
per my colleague's documentation "Chronic unstable  questionable seizure yesterday   [facility] in AM - pt gave pharmacy "[facility] in [facility]", called but they said only ibuprofen, rosuvastatin 20mg daily and vit D2, last  was 2022. still unclear.   [facility] protocol as above   Seizure precautions"    6/21/2023 will get EEG   ct head noted  6/23/2023 await eeg

## 2023-06-23 NOTE — PROGRESS NOTE ADULT - PROBLEM SELECTOR PLAN 3
Pt complaint L shoulder, elbow and wrist pain. Fingers are swollen  F/u CK to r/o rhabdo-->94  F/u doppler upper extremities b/l to r/o DVT--> superficial thrombus left upper extremity . elevate arm supportive care  F/u Xray L wrist, elbow and shoulder to r/o bone pathology   check uric acid, f/u esr crp for suspicion of gout  my colleague added ibuprofen PRN for moderate pain  6/22/2023 - uric acid wnl, f/u xrays   consulted rheum- as above- x rays as above

## 2023-06-24 DIAGNOSIS — R50.9 FEVER, UNSPECIFIED: ICD-10-CM

## 2023-06-24 DIAGNOSIS — M10.9 GOUT, UNSPECIFIED: ICD-10-CM

## 2023-06-24 DIAGNOSIS — I50.32 CHRONIC DIASTOLIC (CONGESTIVE) HEART FAILURE: ICD-10-CM

## 2023-06-24 LAB
-  AMIKACIN: SIGNIFICANT CHANGE UP
-  AMIKACIN: SIGNIFICANT CHANGE UP
-  AMOXICILLIN/CLAVULANIC ACID: SIGNIFICANT CHANGE UP
-  AMOXICILLIN/CLAVULANIC ACID: SIGNIFICANT CHANGE UP
-  AMPICILLIN/SULBACTAM: SIGNIFICANT CHANGE UP
-  AMPICILLIN/SULBACTAM: SIGNIFICANT CHANGE UP
-  AMPICILLIN: SIGNIFICANT CHANGE UP
-  AMPICILLIN: SIGNIFICANT CHANGE UP
-  AZTREONAM: SIGNIFICANT CHANGE UP
-  AZTREONAM: SIGNIFICANT CHANGE UP
-  CEFAZOLIN: SIGNIFICANT CHANGE UP
-  CEFAZOLIN: SIGNIFICANT CHANGE UP
-  CEFEPIME: SIGNIFICANT CHANGE UP
-  CEFEPIME: SIGNIFICANT CHANGE UP
-  CEFOXITIN: SIGNIFICANT CHANGE UP
-  CEFOXITIN: SIGNIFICANT CHANGE UP
-  CEFTRIAXONE: SIGNIFICANT CHANGE UP
-  CEFTRIAXONE: SIGNIFICANT CHANGE UP
-  CIPROFLOXACIN: SIGNIFICANT CHANGE UP
-  CIPROFLOXACIN: SIGNIFICANT CHANGE UP
-  ERTAPENEM: SIGNIFICANT CHANGE UP
-  ERTAPENEM: SIGNIFICANT CHANGE UP
-  GENTAMICIN: SIGNIFICANT CHANGE UP
-  GENTAMICIN: SIGNIFICANT CHANGE UP
-  IMIPENEM: SIGNIFICANT CHANGE UP
-  LEVOFLOXACIN: SIGNIFICANT CHANGE UP
-  LEVOFLOXACIN: SIGNIFICANT CHANGE UP
-  MEROPENEM: SIGNIFICANT CHANGE UP
-  MEROPENEM: SIGNIFICANT CHANGE UP
-  NITROFURANTOIN: SIGNIFICANT CHANGE UP
-  NITROFURANTOIN: SIGNIFICANT CHANGE UP
-  PIPERACILLIN/TAZOBACTAM: SIGNIFICANT CHANGE UP
-  PIPERACILLIN/TAZOBACTAM: SIGNIFICANT CHANGE UP
-  TOBRAMYCIN: SIGNIFICANT CHANGE UP
-  TOBRAMYCIN: SIGNIFICANT CHANGE UP
-  TRIMETHOPRIM/SULFAMETHOXAZOLE: SIGNIFICANT CHANGE UP
-  TRIMETHOPRIM/SULFAMETHOXAZOLE: SIGNIFICANT CHANGE UP
ANION GAP SERPL CALC-SCNC: 5 MMOL/L — SIGNIFICANT CHANGE UP (ref 5–17)
BUN SERPL-MCNC: 18 MG/DL — SIGNIFICANT CHANGE UP (ref 7–23)
CALCIUM SERPL-MCNC: 8.8 MG/DL — SIGNIFICANT CHANGE UP (ref 8.5–10.1)
CHLORIDE SERPL-SCNC: 101 MMOL/L — SIGNIFICANT CHANGE UP (ref 96–108)
CO2 SERPL-SCNC: 26 MMOL/L — SIGNIFICANT CHANGE UP (ref 22–31)
CREAT SERPL-MCNC: 1.08 MG/DL — SIGNIFICANT CHANGE UP (ref 0.5–1.3)
CULTURE RESULTS: SIGNIFICANT CHANGE UP
EGFR: 88 ML/MIN/1.73M2 — SIGNIFICANT CHANGE UP
GLUCOSE SERPL-MCNC: 185 MG/DL — HIGH (ref 70–99)
HCT VFR BLD CALC: 26.7 % — LOW (ref 39–50)
HGB BLD-MCNC: 9.3 G/DL — LOW (ref 13–17)
MAGNESIUM SERPL-MCNC: 2.2 MG/DL — SIGNIFICANT CHANGE UP (ref 1.6–2.6)
MCHC RBC-ENTMCNC: 34.3 PG — HIGH (ref 27–34)
MCHC RBC-ENTMCNC: 34.8 G/DL — SIGNIFICANT CHANGE UP (ref 32–36)
MCV RBC AUTO: 98.5 FL — SIGNIFICANT CHANGE UP (ref 80–100)
METHOD TYPE: SIGNIFICANT CHANGE UP
METHOD TYPE: SIGNIFICANT CHANGE UP
NRBC # BLD: 0 /100 WBCS — SIGNIFICANT CHANGE UP (ref 0–0)
ORGANISM # SPEC MICROSCOPIC CNT: SIGNIFICANT CHANGE UP
PHOSPHATE SERPL-MCNC: 3.4 MG/DL — SIGNIFICANT CHANGE UP (ref 2.5–4.5)
PLATELET # BLD AUTO: 377 K/UL — SIGNIFICANT CHANGE UP (ref 150–400)
POTASSIUM SERPL-MCNC: 4.4 MMOL/L — SIGNIFICANT CHANGE UP (ref 3.5–5.3)
POTASSIUM SERPL-SCNC: 4.4 MMOL/L — SIGNIFICANT CHANGE UP (ref 3.5–5.3)
RBC # BLD: 2.71 M/UL — LOW (ref 4.2–5.8)
RBC # FLD: 14.6 % — HIGH (ref 10.3–14.5)
SODIUM SERPL-SCNC: 132 MMOL/L — LOW (ref 135–145)
SPECIMEN SOURCE: SIGNIFICANT CHANGE UP
WBC # BLD: 6.81 K/UL — SIGNIFICANT CHANGE UP (ref 3.8–10.5)
WBC # FLD AUTO: 6.81 K/UL — SIGNIFICANT CHANGE UP (ref 3.8–10.5)

## 2023-06-24 PROCEDURE — 99232 SBSQ HOSP IP/OBS MODERATE 35: CPT

## 2023-06-24 RX ORDER — HYDRALAZINE HCL 50 MG
5 TABLET ORAL ONCE
Refills: 0 | Status: COMPLETED | OUTPATIENT
Start: 2023-06-24 | End: 2023-06-24

## 2023-06-24 RX ORDER — LOSARTAN POTASSIUM 100 MG/1
50 TABLET, FILM COATED ORAL DAILY
Refills: 0 | Status: DISCONTINUED | OUTPATIENT
Start: 2023-06-24 | End: 2023-06-25

## 2023-06-24 RX ORDER — METOPROLOL TARTRATE 50 MG
25 TABLET ORAL DAILY
Refills: 0 | Status: DISCONTINUED | OUTPATIENT
Start: 2023-06-24 | End: 2023-06-25

## 2023-06-24 RX ORDER — HYDRALAZINE HCL 50 MG
10 TABLET ORAL EVERY 6 HOURS
Refills: 0 | Status: DISCONTINUED | OUTPATIENT
Start: 2023-06-24 | End: 2023-06-27

## 2023-06-24 RX ADMIN — Medication 2 MILLIGRAM(S): at 04:27

## 2023-06-24 RX ADMIN — Medication 0.6 MILLIGRAM(S): at 11:14

## 2023-06-24 RX ADMIN — Medication 40 MILLIGRAM(S): at 05:19

## 2023-06-24 RX ADMIN — Medication 5 MILLIGRAM(S): at 17:12

## 2023-06-24 RX ADMIN — LOSARTAN POTASSIUM 50 MILLIGRAM(S): 100 TABLET, FILM COATED ORAL at 09:07

## 2023-06-24 RX ADMIN — Medication 0.3 MILLIGRAM(S): at 13:55

## 2023-06-24 RX ADMIN — Medication 1 MILLIGRAM(S): at 11:14

## 2023-06-24 RX ADMIN — Medication 0.3 MILLIGRAM(S): at 05:19

## 2023-06-24 RX ADMIN — Medication 25 MILLIGRAM(S): at 21:27

## 2023-06-24 RX ADMIN — Medication 0.3 MILLIGRAM(S): at 21:27

## 2023-06-24 RX ADMIN — Medication 1 TABLET(S): at 11:14

## 2023-06-24 RX ADMIN — Medication 5 MILLIGRAM(S): at 15:31

## 2023-06-24 RX ADMIN — ENOXAPARIN SODIUM 40 MILLIGRAM(S): 100 INJECTION SUBCUTANEOUS at 05:19

## 2023-06-24 NOTE — PROGRESS NOTE ADULT - PROBLEM SELECTOR PLAN 3
Pt complaint L shoulder, elbow and wrist pain. Fingers are swollen  F/u CK to r/o rhabdo-->94  F/u doppler upper extremities b/l to r/o DVT--> superficial thrombus left upper extremity . elevate arm supportive care  F/u Xray L wrist, elbow and shoulder to r/o bone pathology   check uric acid, f/u esr crp for suspicion of gout  my colleague added ibuprofen PRN for moderate pain  6/22/2023 - uric acid wnl, f/u xrays   consulted rheum- as above- x rays as above Pt complaint L shoulder, elbow and wrist pain. Fingers are swollen  F/u CK to r/o rhabdo-->94  F/u doppler upper extremities b/l to r/o DVT--> superficial thrombus left upper extremity . elevate arm supportive care  F/u Xray L wrist, elbow and shoulder to r/o bone pathology   check uric acid, f/u esr crp for suspicion of gout  my colleague added ibuprofen PRN for moderate pain  6/22/2023 - uric acid wnl, f/u xrays   consulted rheum- as above- x rays as above   imrpoving

## 2023-06-24 NOTE — PROGRESS NOTE ADULT - PROBLEM SELECTOR PLAN 4
per my colleague's documentation "Chronic unstable  questionable seizure yesterday   [facility] in AM - pt gave pharmacy "[facility] in [facility]", called but they said only ibuprofen, rosuvastatin 20mg daily and vit D2, last  was 2022. still unclear.   [facility] protocol as above   Seizure precautions"    6/21/2023 will get EEG   ct head noted  6/23/2023 await eeg

## 2023-06-24 NOTE — PROGRESS NOTE ADULT - SUBJECTIVE AND OBJECTIVE BOX
Patient is a 43y old  Male who presents with a chief complaint of alcohol withdrawal, MARTY (19 Jun 2023 17:06)      INTERVAL HPI/OVERNIGHT EVENTS:      MEDICATIONS  (STANDING):  cloNIDine 0.3 milliGRAM(s) Oral every 8 hours  colchicine 0.6 milliGRAM(s) Oral daily  enoxaparin Injectable 40 milliGRAM(s) SubCutaneous every 24 hours  folic acid 1 milliGRAM(s) Oral daily  losartan 50 milliGRAM(s) Oral daily  multivitamin 1 Tablet(s) Oral daily  predniSONE   Tablet 40 milliGRAM(s) Oral daily    MEDICATIONS  (PRN):  acetaminophen     Tablet .. 650 milliGRAM(s) Oral every 6 hours PRN Temp greater or equal to 38C (100.4F), Mild Pain (1 - 3)  aluminum hydroxide/magnesium hydroxide/simethicone Suspension 30 milliLiter(s) Oral every 4 hours PRN Dyspepsia  ibuprofen  Tablet. 400 milliGRAM(s) Oral every 8 hours PRN Moderate Pain (4 - 6)  LORazepam   Injectable 2 milliGRAM(s) IV Push every 1 hour PRN Symptom-triggered: each CIWA -Ar score 8 or GREATER  melatonin 3 milliGRAM(s) Oral at bedtime PRN Insomnia  morphine  - Injectable 2 milliGRAM(s) IV Push every 6 hours PRN Severe Pain (7 - 10)  ondansetron Injectable 4 milliGRAM(s) IV Push every 8 hours PRN Nausea and/or Vomiting    Vital Signs Last 24 Hrs  T(C): 37.1 (24 Jun 2023 07:54), Max: 37.9 (23 Jun 2023 16:30)  T(F): 98.7 (24 Jun 2023 07:54), Max: 100.2 (23 Jun 2023 16:30)  HR: 71 (24 Jun 2023 07:54) (71 - 87)  BP: 178/108 (24 Jun 2023 07:54) (153/98 - 178/108)  BP(mean): --  RR: 18 (24 Jun 2023 07:54) (18 - 19)  SpO2: 94% (24 Jun 2023 07:54) (94% - 100%)    Parameters below as of 24 Jun 2023 07:54  Patient On (Oxygen Delivery Method): room air          PHYSICAL EXAM:  GENERAL: NAD, well-groomed, well-developed  HEAD:  Atraumatic, Normocephalic  EYES: EOMI, conjunctiva and sclera clear  ENMT: No tonsillar erythema, exudates, or enlargement; Moist mucous membranes  NECK: Supple   NERVOUS SYSTEM:  Alert & Oriented X3, Good concentration; Moving all extremities  CHEST/LUNG:  No rales, rhonchi, wheezing, or rubs  HEART: Regular rate and rhythm; No murmurs, rubs, or gallops  ABDOMEN: Soft, Nontender, Nondistended; Bowel sounds present  EXTREMITIES:  2+ Peripheral Pulses, No clubbing, cyanosis, + swelling in hand joints,  more on right middle finger , left ankle warm and swelling and tender left upper extremity swollen and elbow warm,  right knee warm tender  SKIN: No rashes or lesions    LABS:                          9.3    6.81  )-----------( 377      ( 24 Jun 2023 06:05 )             26.7   06-24    132<L>  |  101  |  18  ----------------------------<  185<H>  4.4   |  26  |  1.08    Ca    8.8      24 Jun 2023 06:05  Phos  3.4     06-24  Mg     2.2     06-24        Culture - Blood (collected 21 Jun 2023 15:07)  Source: .Blood Blood-Peripheral  Preliminary Report (22 Jun 2023 19:02):    No growth to date.    Culture - Blood (collected 21 Jun 2023 14:37)  Source: .Blood Blood-Peripheral  Preliminary Report (22 Jun 2023 19:02):    No growth to date.    Culture - Urine (collected 21 Jun 2023 13:45)  Source: Clean Catch Clean Catch (Midstream)  Preliminary Report (23 Jun 2023 08:00):    >100,000 CFU/ml Gram Negative Rods      Creatine Kinase, Serum (06.20.23 @ 05:30)    Creatine Kinase, Serum: 94 U/L      RAD:  < from: US Duplex Venous Lower Ext Complete, Bilateral (06.23.23 @ 09:20) >  IMPRESSION:  No evidence of deep venous thrombosis in either lower extremity.    < end of copied text >  < from: Xray Hand 2 Views, Right (06.22.23 @ 12:42) >  IMPRESSION:    No acute radiographic findings.    < end of copied text >  < from: Xray Chest 1 View- PORTABLE-Urgent (Xray Chest 1 View- PORTABLE-Urgent .) (06.21.23 @ 21:28) >    Impression:    No acute pulmonary disease.    < end of copied text >  < from: Xray Wrist 3 Views, Left (06.20.23 @ 17:43) >  IMPRESSION:    No acute radiographic findings.    < end of copied text >  < from: Xray Shoulder 2 Views, Left (06.20.23 @ 17:42) >  IMPRESSION:    No acute radiographic findings.    < end of copied text >    < from: TTE Echo Complete w/o Contrast w/ Doppler (06.23.23 @ 13:29) >    Summary:   1. Normal global left ventricular systolic function.   2. Left ventricular ejection fraction, by visual estimation, is 55 to   60%.   3. Spectral Doppler shows impaired relaxation pattern of left   ventricular myocardial filling (Grade I diastolic dysfunction).   4. Normal left atrial size.   5. Structurally normal mitral valve, with normal leaflet excursion.   6. Mild mitral valve regurgitation.   7. Normal trileaflet aortic valve with normal opening.   8. Structurally normal tricuspid valve, with normal leaflet excursion.   9. Mild tricuspid regurgitation.  10. Structurally normal pulmonic valve, with normal leaflet excursion.  11. Mild pulmonic valve regurgitation.      < end of copied text >       Patient is a 43y old  Male who presents with a chief complaint of alcohol withdrawal, MARTY (19 Jun 2023 17:06)      INTERVAL HPI/OVERNIGHT EVENTS:      MEDICATIONS  (STANDING):  cloNIDine 0.3 milliGRAM(s) Oral every 8 hours  colchicine 0.6 milliGRAM(s) Oral daily  enoxaparin Injectable 40 milliGRAM(s) SubCutaneous every 24 hours  folic acid 1 milliGRAM(s) Oral daily  losartan 50 milliGRAM(s) Oral daily  multivitamin 1 Tablet(s) Oral daily  predniSONE   Tablet 40 milliGRAM(s) Oral daily    MEDICATIONS  (PRN):  acetaminophen     Tablet .. 650 milliGRAM(s) Oral every 6 hours PRN Temp greater or equal to 38C (100.4F), Mild Pain (1 - 3)  aluminum hydroxide/magnesium hydroxide/simethicone Suspension 30 milliLiter(s) Oral every 4 hours PRN Dyspepsia  ibuprofen  Tablet. 400 milliGRAM(s) Oral every 8 hours PRN Moderate Pain (4 - 6)  LORazepam   Injectable 2 milliGRAM(s) IV Push every 1 hour PRN Symptom-triggered: each CIWA -Ar score 8 or GREATER  melatonin 3 milliGRAM(s) Oral at bedtime PRN Insomnia  morphine  - Injectable 2 milliGRAM(s) IV Push every 6 hours PRN Severe Pain (7 - 10)  ondansetron Injectable 4 milliGRAM(s) IV Push every 8 hours PRN Nausea and/or Vomiting    Vital Signs Last 24 Hrs  T(C): 37.1 (24 Jun 2023 07:54), Max: 37.9 (23 Jun 2023 16:30)  T(F): 98.7 (24 Jun 2023 07:54), Max: 100.2 (23 Jun 2023 16:30)  HR: 71 (24 Jun 2023 07:54) (71 - 87)  BP: 178/108 (24 Jun 2023 07:54) (153/98 - 178/108)  BP(mean): --  RR: 18 (24 Jun 2023 07:54) (18 - 19)  SpO2: 94% (24 Jun 2023 07:54) (94% - 100%)    Parameters below as of 24 Jun 2023 07:54  Patient On (Oxygen Delivery Method): room air          PHYSICAL EXAM:  GENERAL: NAD, well-groomed, well-developed  HEAD:  Atraumatic, Normocephalic  EYES: EOMI, conjunctiva and sclera clear  ENMT: No tonsillar erythema, exudates, or enlargement; Moist mucous membranes  NECK: Supple   NERVOUS SYSTEM:  Alert & Oriented X3, Good concentration; Moving all extremities  CHEST/LUNG:  No rales, rhonchi, wheezing, or rubs  HEART: Regular rate and rhythm; No murmurs, rubs, or gallops  ABDOMEN: Soft, Nontender, Nondistended; Bowel sounds present  EXTREMITIES:  2+ Peripheral Pulses, No clubbing, cyanosis, + swelling in hand joints,  more on right middle finger , left ankle improved warmth imrpoved swelling no tender,  left upper extremity improving swelling , improved right knee warm tender  SKIN: No rashes or lesions    LABS:                          9.3    6.81  )-----------( 377      ( 24 Jun 2023 06:05 )             26.7   06-24    132<L>  |  101  |  18  ----------------------------<  185<H>  4.4   |  26  |  1.08    Ca    8.8      24 Jun 2023 06:05  Phos  3.4     06-24  Mg     2.2     06-24        Culture - Blood (collected 21 Jun 2023 15:07)  Source: .Blood Blood-Peripheral  Preliminary Report (22 Jun 2023 19:02):    No growth to date.    Culture - Blood (collected 21 Jun 2023 14:37)  Source: .Blood Blood-Peripheral  Preliminary Report (22 Jun 2023 19:02):    No growth to date.    Culture - Urine (collected 21 Jun 2023 13:45)  Source: Clean Catch Clean Catch (Midstream)  Preliminary Report (23 Jun 2023 08:00):    >100,000 CFU/ml Gram Negative Rods      Creatine Kinase, Serum (06.20.23 @ 05:30)    Creatine Kinase, Serum: 94 U/L      RAD:  < from: US Duplex Venous Lower Ext Complete, Bilateral (06.23.23 @ 09:20) >  IMPRESSION:  No evidence of deep venous thrombosis in either lower extremity.    < end of copied text >  < from: Xray Hand 2 Views, Right (06.22.23 @ 12:42) >  IMPRESSION:    No acute radiographic findings.    < end of copied text >  < from: Xray Chest 1 View- PORTABLE-Urgent (Xray Chest 1 View- PORTABLE-Urgent .) (06.21.23 @ 21:28) >    Impression:    No acute pulmonary disease.    < end of copied text >  < from: Xray Wrist 3 Views, Left (06.20.23 @ 17:43) >  IMPRESSION:    No acute radiographic findings.    < end of copied text >  < from: Xray Shoulder 2 Views, Left (06.20.23 @ 17:42) >  IMPRESSION:    No acute radiographic findings.    < end of copied text >    < from: TTE Echo Complete w/o Contrast w/ Doppler (06.23.23 @ 13:29) >    Summary:   1. Normal global left ventricular systolic function.   2. Left ventricular ejection fraction, by visual estimation, is 55 to   60%.   3. Spectral Doppler shows impaired relaxation pattern of left   ventricular myocardial filling (Grade I diastolic dysfunction).   4. Normal left atrial size.   5. Structurally normal mitral valve, with normal leaflet excursion.   6. Mild mitral valve regurgitation.   7. Normal trileaflet aortic valve with normal opening.   8. Structurally normal tricuspid valve, with normal leaflet excursion.   9. Mild tricuspid regurgitation.  10. Structurally normal pulmonic valve, with normal leaflet excursion.  11. Mild pulmonic valve regurgitation.      < end of copied text >

## 2023-06-24 NOTE — PROGRESS NOTE ADULT - PROBLEM SELECTOR PLAN 2
Reports injury at work  Xray R hand ordered f/u   suspect gout f/u uric acid , esr ,crp  6/22/2023 uric acid wnl   esr and crp elevated  f/u xray r/o fracture    may need ct scan and hand surgeon    appreciate ortho consult  6/23/2023 await rheum eval/consult   case also d/w dr. lee ( hand surgeon) reccs mri hand   will consult ID consult   continue with colchicine , consider low dose steroids , blood cx negative. get echo    6/24/2023 echo as above -blood cx  ngtd-- Reports injury at work  Xray R hand ordered f/u   suspect gout f/u uric acid , esr ,crp  6/22/2023 uric acid wnl   esr and crp elevated  f/u xray r/o fracture    may need ct scan and hand surgeon    appreciate ortho consult  6/23/2023 await rheum eval/consult   case also d/w dr. lee ( hand surgeon) reccs mri hand   will consult ID consult   continue with colchicine , consider low dose steroids , blood cx negative. get echo    6/24/2023 echo as above -blood cx  ngtd--   improving

## 2023-06-25 PROCEDURE — 95816 EEG AWAKE AND DROWSY: CPT | Mod: 26

## 2023-06-25 PROCEDURE — 99232 SBSQ HOSP IP/OBS MODERATE 35: CPT

## 2023-06-25 RX ORDER — LOSARTAN POTASSIUM 100 MG/1
100 TABLET, FILM COATED ORAL DAILY
Refills: 0 | Status: DISCONTINUED | OUTPATIENT
Start: 2023-06-26 | End: 2023-06-27

## 2023-06-25 RX ORDER — METOPROLOL TARTRATE 50 MG
25 TABLET ORAL ONCE
Refills: 0 | Status: COMPLETED | OUTPATIENT
Start: 2023-06-25 | End: 2023-06-25

## 2023-06-25 RX ORDER — LOSARTAN POTASSIUM 100 MG/1
50 TABLET, FILM COATED ORAL ONCE
Refills: 0 | Status: COMPLETED | OUTPATIENT
Start: 2023-06-25 | End: 2023-06-25

## 2023-06-25 RX ORDER — METOPROLOL TARTRATE 50 MG
50 TABLET ORAL DAILY
Refills: 0 | Status: DISCONTINUED | OUTPATIENT
Start: 2023-06-26 | End: 2023-06-27

## 2023-06-25 RX ADMIN — ENOXAPARIN SODIUM 40 MILLIGRAM(S): 100 INJECTION SUBCUTANEOUS at 05:36

## 2023-06-25 RX ADMIN — Medication 40 MILLIGRAM(S): at 05:36

## 2023-06-25 RX ADMIN — MORPHINE SULFATE 2 MILLIGRAM(S): 50 CAPSULE, EXTENDED RELEASE ORAL at 05:35

## 2023-06-25 RX ADMIN — Medication 0.3 MILLIGRAM(S): at 21:31

## 2023-06-25 RX ADMIN — Medication 1 MILLIGRAM(S): at 15:08

## 2023-06-25 RX ADMIN — Medication 1 TABLET(S): at 15:08

## 2023-06-25 RX ADMIN — Medication 25 MILLIGRAM(S): at 05:35

## 2023-06-25 RX ADMIN — Medication 10 MILLIGRAM(S): at 07:14

## 2023-06-25 RX ADMIN — Medication 0.3 MILLIGRAM(S): at 05:36

## 2023-06-25 RX ADMIN — Medication 0.3 MILLIGRAM(S): at 15:08

## 2023-06-25 RX ADMIN — LOSARTAN POTASSIUM 50 MILLIGRAM(S): 100 TABLET, FILM COATED ORAL at 05:35

## 2023-06-25 RX ADMIN — Medication 0.6 MILLIGRAM(S): at 15:08

## 2023-06-25 NOTE — PROGRESS NOTE ADULT - SUBJECTIVE AND OBJECTIVE BOX
Patient is a 43y old  Male who presents with a chief complaint of alcohol withdrawal, MARTY (19 Jun 2023 17:06)      INTERVAL HPI/OVERNIGHT EVENTS:      MEDICATIONS  (STANDING):  cloNIDine 0.3 milliGRAM(s) Oral every 8 hours  colchicine 0.6 milliGRAM(s) Oral daily  enoxaparin Injectable 40 milliGRAM(s) SubCutaneous every 24 hours  folic acid 1 milliGRAM(s) Oral daily  losartan 50 milliGRAM(s) Oral daily  multivitamin 1 Tablet(s) Oral daily  predniSONE   Tablet 40 milliGRAM(s) Oral daily    MEDICATIONS  (PRN):  acetaminophen     Tablet .. 650 milliGRAM(s) Oral every 6 hours PRN Temp greater or equal to 38C (100.4F), Mild Pain (1 - 3)  aluminum hydroxide/magnesium hydroxide/simethicone Suspension 30 milliLiter(s) Oral every 4 hours PRN Dyspepsia  ibuprofen  Tablet. 400 milliGRAM(s) Oral every 8 hours PRN Moderate Pain (4 - 6)  LORazepam   Injectable 2 milliGRAM(s) IV Push every 1 hour PRN Symptom-triggered: each CIWA -Ar score 8 or GREATER  melatonin 3 milliGRAM(s) Oral at bedtime PRN Insomnia  morphine  - Injectable 2 milliGRAM(s) IV Push every 6 hours PRN Severe Pain (7 - 10)  ondansetron Injectable 4 milliGRAM(s) IV Push every 8 hours PRN Nausea and/or Vomiting    Vital Signs Last 24 Hrs  T(C): 37.1 (24 Jun 2023 07:54), Max: 37.9 (23 Jun 2023 16:30)  T(F): 98.7 (24 Jun 2023 07:54), Max: 100.2 (23 Jun 2023 16:30)  HR: 71 (24 Jun 2023 07:54) (71 - 87)  BP: 178/108 (24 Jun 2023 07:54) (153/98 - 178/108)  BP(mean): --  RR: 18 (24 Jun 2023 07:54) (18 - 19)  SpO2: 94% (24 Jun 2023 07:54) (94% - 100%)    Parameters below as of 24 Jun 2023 07:54  Patient On (Oxygen Delivery Method): room air          PHYSICAL EXAM:  GENERAL: NAD, well-groomed, well-developed  HEAD:  Atraumatic, Normocephalic  EYES: EOMI, conjunctiva and sclera clear  ENMT: No tonsillar erythema, exudates, or enlargement; Moist mucous membranes  NECK: Supple   NERVOUS SYSTEM:  Alert & Oriented X3, Good concentration; Moving all extremities  CHEST/LUNG:  No rales, rhonchi, wheezing, or rubs  HEART: Regular rate and rhythm; No murmurs, rubs, or gallops  ABDOMEN: Soft, Nontender, Nondistended; Bowel sounds present  EXTREMITIES:  2+ Peripheral Pulses, No clubbing, cyanosis, + swelling in hand joints,  more on right middle finger , left ankle improved warmth imrpoved swelling no tender,  left upper extremity improving swelling , improved right knee warm tender  SKIN: No rashes or lesions    LABS:                          9.3    6.81  )-----------( 377      ( 24 Jun 2023 06:05 )             26.7   06-24    132<L>  |  101  |  18  ----------------------------<  185<H>  4.4   |  26  |  1.08    Ca    8.8      24 Jun 2023 06:05  Phos  3.4     06-24  Mg     2.2     06-24        Culture - Blood (collected 21 Jun 2023 15:07)  Source: .Blood Blood-Peripheral  Preliminary Report (22 Jun 2023 19:02):    No growth to date.    Culture - Blood (collected 21 Jun 2023 14:37)  Source: .Blood Blood-Peripheral  Preliminary Report (22 Jun 2023 19:02):    No growth to date.    Culture - Urine (collected 21 Jun 2023 13:45)  Source: Clean Catch Clean Catch (Midstream)  Preliminary Report (23 Jun 2023 08:00):    >100,000 CFU/ml Gram Negative Rods      Creatine Kinase, Serum (06.20.23 @ 05:30)    Creatine Kinase, Serum: 94 U/L      RAD:  < from: US Duplex Venous Lower Ext Complete, Bilateral (06.23.23 @ 09:20) >  IMPRESSION:  No evidence of deep venous thrombosis in either lower extremity.    < end of copied text >  < from: Xray Hand 2 Views, Right (06.22.23 @ 12:42) >  IMPRESSION:    No acute radiographic findings.    < end of copied text >  < from: Xray Chest 1 View- PORTABLE-Urgent (Xray Chest 1 View- PORTABLE-Urgent .) (06.21.23 @ 21:28) >    Impression:    No acute pulmonary disease.    < end of copied text >  < from: Xray Wrist 3 Views, Left (06.20.23 @ 17:43) >  IMPRESSION:    No acute radiographic findings.    < end of copied text >  < from: Xray Shoulder 2 Views, Left (06.20.23 @ 17:42) >  IMPRESSION:    No acute radiographic findings.    < end of copied text >    < from: TTE Echo Complete w/o Contrast w/ Doppler (06.23.23 @ 13:29) >    Summary:   1. Normal global left ventricular systolic function.   2. Left ventricular ejection fraction, by visual estimation, is 55 to   60%.   3. Spectral Doppler shows impaired relaxation pattern of left   ventricular myocardial filling (Grade I diastolic dysfunction).   4. Normal left atrial size.   5. Structurally normal mitral valve, with normal leaflet excursion.   6. Mild mitral valve regurgitation.   7. Normal trileaflet aortic valve with normal opening.   8. Structurally normal tricuspid valve, with normal leaflet excursion.   9. Mild tricuspid regurgitation.  10. Structurally normal pulmonic valve, with normal leaflet excursion.  11. Mild pulmonic valve regurgitation.      < end of copied text >       Patient is a 43y old  Male who presents with a chief complaint of alcohol withdrawal, MARTY (19 Jun 2023 17:06)      INTERVAL HPI/OVERNIGHT EVENTS:    MEDICATIONS  (STANDING):  cloNIDine 0.3 milliGRAM(s) Oral every 8 hours  colchicine 0.6 milliGRAM(s) Oral daily  enoxaparin Injectable 40 milliGRAM(s) SubCutaneous every 24 hours  folic acid 1 milliGRAM(s) Oral daily  multivitamin 1 Tablet(s) Oral daily  predniSONE   Tablet 40 milliGRAM(s) Oral daily    MEDICATIONS  (PRN):  acetaminophen     Tablet .. 650 milliGRAM(s) Oral every 6 hours PRN Temp greater or equal to 38C (100.4F), Mild Pain (1 - 3)  aluminum hydroxide/magnesium hydroxide/simethicone Suspension 30 milliLiter(s) Oral every 4 hours PRN Dyspepsia  hydrALAZINE Injectable 10 milliGRAM(s) IV Push every 6 hours PRN sbp >180  ibuprofen  Tablet. 400 milliGRAM(s) Oral every 8 hours PRN Moderate Pain (4 - 6)  melatonin 3 milliGRAM(s) Oral at bedtime PRN Insomnia  morphine  - Injectable 2 milliGRAM(s) IV Push every 6 hours PRN Severe Pain (7 - 10)  ondansetron Injectable 4 milliGRAM(s) IV Push every 8 hours PRN Nausea and/or Vomiting    Vital Signs Last 24 Hrs  T(C): 36.9 (25 Jun 2023 04:56), Max: 37.4 (24 Jun 2023 16:10)  T(F): 98.5 (25 Jun 2023 04:56), Max: 99.3 (24 Jun 2023 16:10)  HR: 86 (25 Jun 2023 09:06) (71 - 92)  BP: 131/74 (25 Jun 2023 09:06) (127/83 - 191/112)  BP(mean): --  RR: 19 (25 Jun 2023 04:56) (18 - 19)  SpO2: 99% (25 Jun 2023 04:56) (98% - 100%)    Parameters below as of 25 Jun 2023 04:56  Patient On (Oxygen Delivery Method): room air            PHYSICAL EXAM:  GENERAL: NAD, well-groomed, well-developed  HEAD:  Atraumatic, Normocephalic  EYES: EOMI, conjunctiva and sclera clear  ENMT: No tonsillar erythema, exudates, or enlargement; Moist mucous membranes  NECK: Supple   NERVOUS SYSTEM:  Alert & Oriented X3, Good concentration; Moving all extremities  CHEST/LUNG:  No rales, rhonchi, wheezing, or rubs  HEART: Regular rate and rhythm; No murmurs, rubs, or gallops  ABDOMEN: Soft, Nontender, Nondistended; Bowel sounds present  EXTREMITIES:  2+ Peripheral Pulses, No clubbing, cyanosis, + swelling in hand joints,  more on right middle finger , left ankle improved warmth imrpoved swelling no tender,  left upper extremity improving swelling , improved right knee warm tender  SKIN: No rashes or lesions    LABS:                          9.3    6.81  )-----------( 377      ( 24 Jun 2023 06:05 )             26.7   06-24    132<L>  |  101  |  18  ----------------------------<  185<H>  4.4   |  26  |  1.08    Ca    8.8      24 Jun 2023 06:05  Phos  3.4     06-24  Mg     2.2     06-24        Culture - Blood (collected 21 Jun 2023 15:07)  Source: .Blood Blood-Peripheral  Preliminary Report (22 Jun 2023 19:02):    No growth to date.    Culture - Blood (collected 21 Jun 2023 14:37)  Source: .Blood Blood-Peripheral  Preliminary Report (22 Jun 2023 19:02):    No growth to date.    Culture - Urine (collected 21 Jun 2023 13:45)  Source: Clean Catch Clean Catch (Midstream)  Preliminary Report (23 Jun 2023 08:00):    >100,000 CFU/ml Gram Negative Rods      Creatine Kinase, Serum (06.20.23 @ 05:30)    Creatine Kinase, Serum: 94 U/L      RAD:  < from: US Duplex Venous Lower Ext Complete, Bilateral (06.23.23 @ 09:20) >  IMPRESSION:  No evidence of deep venous thrombosis in either lower extremity.    < end of copied text >  < from: Xray Hand 2 Views, Right (06.22.23 @ 12:42) >  IMPRESSION:    No acute radiographic findings.    < end of copied text >  < from: Xray Chest 1 View- PORTABLE-Urgent (Xray Chest 1 View- PORTABLE-Urgent .) (06.21.23 @ 21:28) >    Impression:    No acute pulmonary disease.    < end of copied text >  < from: Xray Wrist 3 Views, Left (06.20.23 @ 17:43) >  IMPRESSION:    No acute radiographic findings.    < end of copied text >  < from: Xray Shoulder 2 Views, Left (06.20.23 @ 17:42) >  IMPRESSION:    No acute radiographic findings.    < end of copied text >    < from: TTE Echo Complete w/o Contrast w/ Doppler (06.23.23 @ 13:29) >    Summary:   1. Normal global left ventricular systolic function.   2. Left ventricular ejection fraction, by visual estimation, is 55 to   60%.   3. Spectral Doppler shows impaired relaxation pattern of left   ventricular myocardial filling (Grade I diastolic dysfunction).   4. Normal left atrial size.   5. Structurally normal mitral valve, with normal leaflet excursion.   6. Mild mitral valve regurgitation.   7. Normal trileaflet aortic valve with normal opening.   8. Structurally normal tricuspid valve, with normal leaflet excursion.   9. Mild tricuspid regurgitation.  10. Structurally normal pulmonic valve, with normal leaflet excursion.  11. Mild pulmonic valve regurgitation.      < end of copied text >

## 2023-06-25 NOTE — PROGRESS NOTE ADULT - PROBLEM SELECTOR PLAN 2
Reports injury at work  Xray R hand ordered f/u   suspect gout f/u uric acid , esr ,crp  6/22/2023 uric acid wnl   esr and crp elevated  f/u xray r/o fracture    may need ct scan and hand surgeon    appreciate ortho consult  6/23/2023 await rheum eval/consult   case also d/w dr. lee ( hand surgeon) reccs mri hand   will consult ID consult   continue with colchicine , consider low dose steroids , blood cx negative. get echo    6/24/2023 echo as above -blood cx  ngtd--   improving  6/25/2023 improving swelling in finger- suspect gout continue colchicine and steroids

## 2023-06-25 NOTE — EEG REPORT - NS EEG TEXT BOX
SYDNEE GREEN [id number] 43y (1980)M  Admitting MD: Dr. Blanquita Conroy    Study Date: 06-25-23    --------------------------------------------------------------------------------------------------  History:  CC/ HPI Patient is a 43y old  Male who presents with a chief complaint of alcohol withdrawal, MARTY (25 Jun 2023 10:04)    cloNIDine 0.3 milliGRAM(s) Oral every 8 hours  colchicine 0.6 milliGRAM(s) Oral daily  enoxaparin Injectable 40 milliGRAM(s) SubCutaneous every 24 hours  folic acid 1 milliGRAM(s) Oral daily  multivitamin 1 Tablet(s) Oral daily  predniSONE   Tablet 40 milliGRAM(s) Oral daily    --------------------------------------------------------------------------------------------------  Study Interpretation:    [[[Abbreviation Key:  PDR=alpha rhythm/posterior dominant rhythm. A-P=anterior posterior gradient.  Amplitude: ‘very low’:<20; ‘low’:20-50; ‘medium’:; ‘high’:>200uV.  Persistence for periodic/rhythmic patterns (% of epoch) ‘rare’:<1%; ‘occasional’:1-10%; ‘frequent’:10-50%; ‘abundant’:50-90%; ‘continuous’:>90%.  Persistence for sporadic discharges: ‘rare’:<1/hr; ‘occasional’:1/min-1/hr; ‘frequent’:>1/min; ‘abundant’:>1/10 sec.  GRDA=generalized rhythmic delta activity; FIRDA=frontal intermittent GRDA; LRDA=lateralized rhythmic delta activity; TIRDA=temporal intermittent rhythmic delta activity;  LPD=PLED=lateralized periodic discharges; GPD=generalized periodic discharges; BiPDs=BiPLEDs=bilateral independent periodic epileptiform discharges; SIRPID=stimulus induced rhythmic, periodic, or ictal appearing discharges; BIRDs=brief potentially ictal rhythmic discharges >4 Hz, lasting .5-10s; PFA (paroxysmal bursts >13 Hz or =8 Hz).  Modifiers: +F=with fast component; +S=with spike component; +R=with rhythmic component.  S-B=burst suppression pattern.  Max=maximal. N1-drowsy; N2-stage II sleep; N3-slow wave sleep. SSS/BETS=small sharp spikes/benign epileptiform transients of sleep. HV=hyperventilation; PS=photic stimulation]]]    FINDINGS:  The background was continuous, spontaneously variable and reactive.  During wakefulness, the posteriorly dominant rhythm consisted of symmetric, well modulated 9 Hz activity, with an amplitude to 40 uV, that attenuated to eye opening.  Low amplitude central beta was noted in wakefulness.    Background Slowing:  Generalized slowing: none was present.  Focal slowing: Intermittent right posterior temporal sharply contoured delta    Sleep Background:  -Drowsiness was characterized by fragmentation, attenuation, and slowing of the background activity.    -N2 sleep transients were not recorded.    Epileptiform Activity:   No interictal epileptiform discharges were present.    Events:  No clinical events were recorded.  No seizures were recorded.    Activation Procedures:   -Hyperventilation was not performed.    -Photic stimulation was performed and did not elicit any abnormalities.      Artifacts:  Intermittent myogenic and external motion artifacts were noted.    ECG:  The heart rate on single channel ECG at baseline was predominantly near BPM = 80-90  -----------------------------------------------------------------------------------------------------    EEG Classification / Summary:  Abnormal EEG study    Intermittent right posterior temporal slowing  -----------------------------------------------------------------------------------------------------    Clinical Impression:    Evidence for right posterior temporal cerebral dysfunction  There were no epileptiform abnormalities recorded.      This is a prelim report only, pending review with attending prior to finalization.    -------------------------------------------------------------------------------------------------------  [facility] EEG Reading Room Ph#: [phone]  Epilepsy Answering Service after 5PM and before 8:30AM: Ph#: [phone]    Roel Caceres M.D, epilepsy fellow   SYDNEE GREEN [id number] 43y (1980)M  Admitting MD: Dr. Blanquita Conroy    Study Date: 06-25-23    --------------------------------------------------------------------------------------------------  History:  CC/ HPI Patient is a 43y old  Male who presents with a chief complaint of alcohol withdrawal, MARTY (25 Jun 2023 10:04)    cloNIDine 0.3 milliGRAM(s) Oral every 8 hours  colchicine 0.6 milliGRAM(s) Oral daily  enoxaparin Injectable 40 milliGRAM(s) SubCutaneous every 24 hours  folic acid 1 milliGRAM(s) Oral daily  multivitamin 1 Tablet(s) Oral daily  predniSONE   Tablet 40 milliGRAM(s) Oral daily    --------------------------------------------------------------------------------------------------  Study Interpretation:    [[[Abbreviation Key:  PDR=alpha rhythm/posterior dominant rhythm. A-P=anterior posterior gradient.  Amplitude: ‘very low’:<20; ‘low’:20-50; ‘medium’:; ‘high’:>200uV.  Persistence for periodic/rhythmic patterns (% of epoch) ‘rare’:<1%; ‘occasional’:1-10%; ‘frequent’:10-50%; ‘abundant’:50-90%; ‘continuous’:>90%.  Persistence for sporadic discharges: ‘rare’:<1/hr; ‘occasional’:1/min-1/hr; ‘frequent’:>1/min; ‘abundant’:>1/10 sec.  GRDA=generalized rhythmic delta activity; FIRDA=frontal intermittent GRDA; LRDA=lateralized rhythmic delta activity; TIRDA=temporal intermittent rhythmic delta activity;  LPD=PLED=lateralized periodic discharges; GPD=generalized periodic discharges; BiPDs=BiPLEDs=bilateral independent periodic epileptiform discharges; SIRPID=stimulus induced rhythmic, periodic, or ictal appearing discharges; BIRDs=brief potentially ictal rhythmic discharges >4 Hz, lasting .5-10s; PFA (paroxysmal bursts >13 Hz or =8 Hz).  Modifiers: +F=with fast component; +S=with spike component; +R=with rhythmic component.  S-B=burst suppression pattern.  Max=maximal. N1-drowsy; N2-stage II sleep; N3-slow wave sleep. SSS/BETS=small sharp spikes/benign epileptiform transients of sleep. HV=hyperventilation; PS=photic stimulation]]]    FINDINGS:  The background was continuous, spontaneously variable and reactive.  During wakefulness, the posteriorly dominant rhythm consisted of symmetric, well modulated 9 Hz activity, with an amplitude to 40 uV, that attenuated to eye opening.  Low amplitude central beta was noted in wakefulness.    Background Slowing:  Generalized slowing: none was present.  Focal slowing: Intermittent right posterior temporal sharply contoured delta    Sleep Background:  -Drowsiness was characterized by fragmentation, attenuation, and slowing of the background activity.    -N2 sleep transients were not recorded.    Epileptiform Activity:   No interictal epileptiform discharges were present.    Events:  No clinical events were recorded.  No seizures were recorded.    Activation Procedures:   -Hyperventilation was not performed.    -Photic stimulation was performed and did not elicit any abnormalities.      Artifacts:  Intermittent myogenic and external motion artifacts were noted.    ECG:  The heart rate on single channel ECG at baseline was predominantly near BPM = 80-90  -----------------------------------------------------------------------------------------------------    EEG Classification / Summary:  Abnormal EEG study    Intermittent right posterior temporal slowing  -----------------------------------------------------------------------------------------------------    Clinical Impression:    Evidence for right posterior temporal cerebral dysfunction  There were no epileptiform abnormalities recorded.          -------------------------------------------------------------------------------------------------------  [facility] EEG Reading Room Ph#: [phone]  Epilepsy Answering Service after 5PM and before 8:30AM: Ph#: [phone]    Roel Caceres M.D, epilepsy fellow

## 2023-06-25 NOTE — PROGRESS NOTE ADULT - PROBLEM SELECTOR PLAN 4
per my colleague's documentation "Chronic unstable  questionable seizure yesterday   [facility] in AM - pt gave pharmacy "[facility] in [facility]", called but they said only ibuprofen, rosuvastatin 20mg daily and vit D2, last  was 2022. still unclear.   [facility] protocol as above   Seizure precautions"    6/21/2023 will get EEG   ct head noted  6/23/2023 await eeg per my colleague's documentation "Chronic unstable  questionable seizure yesterday   [facility] in AM - pt gave pharmacy "[facility] in [facility]", called but they said only ibuprofen, rosuvastatin 20mg daily and vit D2, last  was 2022. still unclear.   [facility] protocol as above   Seizure precautions"    6/21/2023 will get EEG   ct head noted  6/23/2023 await eeg  6/25/2023 eeg as above will obtain mri brain to rule out structural lesion per my colleague's documentation "Chronic unstable  questionable seizure yesterday   [facility] in AM - pt gave pharmacy "[facility]", called but they said only ibuprofen, rosuvastatin 20mg daily and vit D2, last  was 2022. still unclear.   [facility] protocol as above   Seizure precautions"    6/21/2023 will get EEG   ct head noted  6/23/2023 await eeg  6/25/2023 eeg as above will obtain mri brain to rule out structural lesion   pt is now most alert  and states he has had seizures in the past but has not been on seizure meds . last one august last year . unclear  why no meds.will obtain neurology consult

## 2023-06-25 NOTE — PROGRESS NOTE ADULT - NSPROGADDITIONALINFOA_GEN_ALL_CORE
dispo- await pt eval - start d/c planinng dispo- await pt eval - start d/c planinng  nalso need bp managed and mri brain given abnormal EEG findings

## 2023-06-25 NOTE — PROGRESS NOTE ADULT - PROBLEM SELECTOR PLAN 3
Pt complaint L shoulder, elbow and wrist pain. Fingers are swollen  F/u CK to r/o rhabdo-->94  F/u doppler upper extremities b/l to r/o DVT--> superficial thrombus left upper extremity . elevate arm supportive care  F/u Xray L wrist, elbow and shoulder to r/o bone pathology   check uric acid, f/u esr crp for suspicion of gout  my colleague added ibuprofen PRN for moderate pain  6/22/2023 - uric acid wnl, f/u xrays   consulted rheum- as above- x rays as above   improving  6/25/2023 improving

## 2023-06-26 PROCEDURE — 70551 MRI BRAIN STEM W/O DYE: CPT | Mod: 26

## 2023-06-26 PROCEDURE — 99232 SBSQ HOSP IP/OBS MODERATE 35: CPT

## 2023-06-26 PROCEDURE — 76377 3D RENDER W/INTRP POSTPROCES: CPT | Mod: 26

## 2023-06-26 PROCEDURE — 73218 MRI UPPER EXTREMITY W/O DYE: CPT | Mod: 26,RT

## 2023-06-26 RX ORDER — LEVETIRACETAM 250 MG/1
500 TABLET, FILM COATED ORAL
Refills: 0 | Status: DISCONTINUED | OUTPATIENT
Start: 2023-06-26 | End: 2023-06-27

## 2023-06-26 RX ADMIN — Medication 400 MILLIGRAM(S): at 22:09

## 2023-06-26 RX ADMIN — LEVETIRACETAM 500 MILLIGRAM(S): 250 TABLET, FILM COATED ORAL at 18:18

## 2023-06-26 RX ADMIN — Medication 0.6 MILLIGRAM(S): at 18:19

## 2023-06-26 RX ADMIN — Medication 50 MILLIGRAM(S): at 05:25

## 2023-06-26 RX ADMIN — Medication 1 MILLIGRAM(S): at 12:54

## 2023-06-26 RX ADMIN — Medication 0.3 MILLIGRAM(S): at 05:25

## 2023-06-26 RX ADMIN — Medication 0.3 MILLIGRAM(S): at 22:05

## 2023-06-26 RX ADMIN — Medication 400 MILLIGRAM(S): at 22:39

## 2023-06-26 RX ADMIN — Medication 40 MILLIGRAM(S): at 05:25

## 2023-06-26 RX ADMIN — ENOXAPARIN SODIUM 40 MILLIGRAM(S): 100 INJECTION SUBCUTANEOUS at 05:24

## 2023-06-26 RX ADMIN — LOSARTAN POTASSIUM 100 MILLIGRAM(S): 100 TABLET, FILM COATED ORAL at 05:25

## 2023-06-26 RX ADMIN — Medication 3 MILLIGRAM(S): at 22:10

## 2023-06-26 RX ADMIN — Medication 1 MILLIGRAM(S): at 18:19

## 2023-06-26 NOTE — PROGRESS NOTE ADULT - SUBJECTIVE AND OBJECTIVE BOX
Patient is a 43y old  Male who presents with a chief complaint of alcohol withdrawal, MARTY (19 Jun 2023 17:06)      INTERVAL HPI/OVERNIGHT EVENTS:    MEDICATIONS  (STANDING):  cloNIDine 0.3 milliGRAM(s) Oral every 8 hours  colchicine 0.6 milliGRAM(s) Oral daily  enoxaparin Injectable 40 milliGRAM(s) SubCutaneous every 24 hours  folic acid 1 milliGRAM(s) Oral daily  levETIRAcetam 500 milliGRAM(s) Oral two times a day  LORazepam   Injectable 1 milliGRAM(s) IV Push once  losartan 100 milliGRAM(s) Oral daily  metoprolol succinate ER 50 milliGRAM(s) Oral daily  multivitamin 1 Tablet(s) Oral daily  predniSONE   Tablet 40 milliGRAM(s) Oral daily    MEDICATIONS  (PRN):  acetaminophen     Tablet .. 650 milliGRAM(s) Oral every 6 hours PRN Temp greater or equal to 38C (100.4F), Mild Pain (1 - 3)  aluminum hydroxide/magnesium hydroxide/simethicone Suspension 30 milliLiter(s) Oral every 4 hours PRN Dyspepsia  hydrALAZINE Injectable 10 milliGRAM(s) IV Push every 6 hours PRN sbp >180  ibuprofen  Tablet. 400 milliGRAM(s) Oral every 8 hours PRN Moderate Pain (4 - 6)  melatonin 3 milliGRAM(s) Oral at bedtime PRN Insomnia  morphine  - Injectable 2 milliGRAM(s) IV Push every 6 hours PRN Severe Pain (7 - 10)  ondansetron Injectable 4 milliGRAM(s) IV Push every 8 hours PRN Nausea and/or Vomiting    Vital Signs Last 24 Hrs  T(C): 37.1 (26 Jun 2023 04:34), Max: 37.6 (25 Jun 2023 16:13)  T(F): 98.7 (26 Jun 2023 04:34), Max: 99.6 (25 Jun 2023 16:13)  HR: 79 (26 Jun 2023 04:34) (74 - 94)  BP: 167/96 (26 Jun 2023 04:34) (138/88 - 167/96)  BP(mean): --  RR: 18 (26 Jun 2023 04:34) (18 - 18)  SpO2: 98% (26 Jun 2023 04:34) (98% - 98%)    Parameters below as of 26 Jun 2023 04:34  Patient On (Oxygen Delivery Method): room air        PHYSICAL EXAM:  GENERAL: NAD, well-groomed, well-developed  HEAD:  Atraumatic, Normocephalic  EYES: EOMI, conjunctiva and sclera clear  ENMT: No tonsillar erythema, exudates, or enlargement; Moist mucous membranes  NECK: Supple   NERVOUS SYSTEM:  Alert & Oriented X3, Good concentration; Moving all extremities  CHEST/LUNG:  No rales, rhonchi, wheezing, or rubs  HEART: Regular rate and rhythm; No murmurs, rubs, or gallops  ABDOMEN: Soft, Nontender, Nondistended; Bowel sounds present  EXTREMITIES:  2+ Peripheral Pulses, No clubbing, cyanosis, + swelling in hand joints,  more on right middle finger , left ankle improved warmth imrpoved swelling no tender,  left upper extremity improving swelling , improved right knee warm tender  SKIN: No rashes or lesions    LABS:          Culture - Blood (collected 21 Jun 2023 15:07)  Source: .Blood Blood-Peripheral  Preliminary Report (22 Jun 2023 19:02):    No growth to date.    Culture - Blood (collected 21 Jun 2023 14:37)  Source: .Blood Blood-Peripheral  Preliminary Report (22 Jun 2023 19:02):    No growth to date.    Culture - Urine (collected 21 Jun 2023 13:45)  Source: Clean Catch Clean Catch (Midstream)  Preliminary Report (23 Jun 2023 08:00):    >100,000 CFU/ml Gram Negative Rods      Creatine Kinase, Serum (06.20.23 @ 05:30)    Creatine Kinase, Serum: 94 U/L      RAD:  < from: US Duplex Venous Lower Ext Complete, Bilateral (06.23.23 @ 09:20) >  IMPRESSION:  No evidence of deep venous thrombosis in either lower extremity.    < end of copied text >  < from: Xray Hand 2 Views, Right (06.22.23 @ 12:42) >  IMPRESSION:    No acute radiographic findings.    < end of copied text >  < from: Xray Chest 1 View- PORTABLE-Urgent (Xray Chest 1 View- PORTABLE-Urgent .) (06.21.23 @ 21:28) >    Impression:    No acute pulmonary disease.    < end of copied text >  < from: Xray Wrist 3 Views, Left (06.20.23 @ 17:43) >  IMPRESSION:    No acute radiographic findings.    < end of copied text >  < from: Xray Shoulder 2 Views, Left (06.20.23 @ 17:42) >  IMPRESSION:    No acute radiographic findings.    < end of copied text >    < from: TTE Echo Complete w/o Contrast w/ Doppler (06.23.23 @ 13:29) >    Summary:   1. Normal global left ventricular systolic function.   2. Left ventricular ejection fraction, by visual estimation, is 55 to   60%.   3. Spectral Doppler shows impaired relaxation pattern of left   ventricular myocardial filling (Grade I diastolic dysfunction).   4. Normal left atrial size.   5. Structurally normal mitral valve, with normal leaflet excursion.   6. Mild mitral valve regurgitation.   7. Normal trileaflet aortic valve with normal opening.   8. Structurally normal tricuspid valve, with normal leaflet excursion.   9. Mild tricuspid regurgitation.  10. Structurally normal pulmonic valve, with normal leaflet excursion.  11. Mild pulmonic valve regurgitation.      < end of copied text >

## 2023-06-26 NOTE — PROGRESS NOTE ADULT - PROBLEM SELECTOR PLAN 7
Cr 2.37---> 1.16 >1.02   Likely prerenal  LR 100ml/hr  - resloved, continue to monitor
resolved

## 2023-06-26 NOTE — PHYSICAL THERAPY INITIAL EVALUATION ADULT - ADDITIONAL COMMENTS
Pt lives in a private house with father with 1 flight of stairs to enter and 1 flight once inside to negotiate with 1 handrail. Pt does not have/use assistive devices, does not drive, does not use glasses, is R handed, ambulates community distances.

## 2023-06-26 NOTE — CONSULT NOTE ADULT - ASSESSMENT
43 yr old male with a pmh of alcohol dependence here with tremors, resolved, PE non-focal EEG right posterior temporal slowing CTH no acute finding    Impression: etoh withdrawal    Patient states he has a hx of seizures, unrelated to etoh, can consider Keppra 500 mg BID  MRI brain epilepsy protocol here or as an outpt
43 year old male presents with severe diffuse pain (L>R), including multiple joints, though also throughout left hand and forearm, of unclear etiology.  Pt also w/ fevers and profoundly elevated ESR/CRP (though this appears chronic, as they were also elevated to this degree in 6/2022).  Doubt septic arthritis, given involvement of multiple joints.  Crystal arthropathy (e.g. gout) possible though unlikely.  The DDX for ESR > 100 includes certain rheumatologic conditions (e.g. SLE, gout, vasculitis, though presentation not typical of these) vs infection vs malignancy.    - Will order labs, including further serologies.    - ID w/u    - If OK w/ ID, can try course of empiric prednisone to help alleviate symptoms for now.
Polyarthritis- L foot, R knee, both wrists. multiple fingers, both elbows  Suspect polyarticular gout in the setting of EtOH consumption and probable EtOH withdrawal seizure  Has had similar episodes before but none as severe as this one  Doubt SLE or RA. Denies any family history of autoimmune disease.  Doubt porphyria- would expect neuropathy, not polyarthritis  Blood cx NTD  No objection to steroids  Thank you for the courtesy of this referral.  I will be available via Teams for any issues that may arise over the weekend.    Judson Velasquez MD  Attending Physician  [facility]  Division of Infectious Diseases  [phone]

## 2023-06-26 NOTE — PROGRESS NOTE ADULT - PROBLEM SELECTOR PLAN 6
Patient  gave pharmacy "[facility] in [facility]", called but they said only ibuprofen, rosuvastatin 20mg daily and vit D2, last  was 2022. still unclear.   Called home number, no one picked up.  Placed  consult to get family's contact number.
Patient is confused- will re-attempt at a later time for medication rec
resolved
Patient is confused- will re-attempt at a later time for medication rec
resolved

## 2023-06-26 NOTE — PROGRESS NOTE ADULT - PROBLEM SELECTOR PROBLEM 2
Swollen finger
MARTY (acute kidney injury)
MARTY (acute kidney injury)
Swollen finger

## 2023-06-26 NOTE — PROGRESS NOTE ADULT - PROBLEM SELECTOR PLAN 8
resolved
presumed chronic  6/25/223 started on arb and beta blocker as discussed above under HTN
presumed chronic  6/25/223 started on arb and beta blocker as discussed above under HTN
resolved
resolved
will be replaced

## 2023-06-26 NOTE — PROGRESS NOTE ADULT - PROBLEM SELECTOR PROBLEM 6
Medication management
MARTY (acute kidney injury)
Medication management
MARTY (acute kidney injury)

## 2023-06-26 NOTE — PHYSICAL THERAPY INITIAL EVALUATION ADULT - GENERAL OBSERVATIONS, REHAB EVAL
pt encountered supine in bed, alert and oriented x4, cardiac monitor in place, NAD. Pt reporting no pain, motivated to participate with PT, able to follow commands.

## 2023-06-26 NOTE — PROGRESS NOTE ADULT - PROBLEM SELECTOR PLAN 1
ciwa at 1
ciwa at 1
New  Last alcoholic beverage 4 days ago- presenting with CIWA >10  CIWA with IV ativan taper ordered  LR 100ml/hr
Last alcoholic beverage 4 days ago- presenting with CIWA >10  CIWA with IV ativan taper ordered  LR 100ml/hr  CIWA 7, pt is agitated. cont CIWA
ciwa at 1
Last alcoholic beverage 4 days ago- presenting with CIWA >10  CIWA with IV ativan taper ordered  LR 100ml/hr  CIWA 6, pt is more calm. cont CIWA
ciwa at 1

## 2023-06-26 NOTE — PROGRESS NOTE ADULT - PROBLEM SELECTOR PROBLEM 8
Diastolic CHF, chronic
Hypophosphatemia
Diastolic CHF, chronic
Hypophosphatemia

## 2023-06-26 NOTE — PROGRESS NOTE ADULT - PROBLEM SELECTOR PLAN 4
per my colleague's documentation "Chronic unstable  questionable seizure yesterday   [facility] in AM - pt gave pharmacy "[facility]", called but they said only ibuprofen, rosuvastatin 20mg daily and vit D2, last  was 2022. still unclear.   CIWA protocol as above   Seizure precautions"    6/21/2023 will get EEG   ct head noted  6/23/2023 await eeg  6/25/2023 eeg as above will obtain mri brain to rule out structural lesion   pt is now most alert  and states he has had seizures in the past but has not been on seizure meds . last one august last year . unclear  why no meds.will obtain neurology consult  6/26/2023 f/u mri brin maribell huang

## 2023-06-26 NOTE — CONSULT NOTE ADULT - REASON FOR ADMISSION
alcohol withdrawal, MARTY

## 2023-06-26 NOTE — PROGRESS NOTE ADULT - PROBLEM SELECTOR PROBLEM 3
Left arm pain
Swollen finger
Left arm pain
Swollen finger
Left arm pain

## 2023-06-26 NOTE — PROGRESS NOTE ADULT - NUTRITIONAL ASSESSMENT
This patient has been assessed with a concern for Malnutrition and has been determined to have a diagnosis/diagnoses of Underweight (BMI < 19).    This patient is being managed with:   Diet Easy to Chew-  Low Sodium  Supplement Feeding Modality:  Oral  Ensure Plus High Protein Cans or Servings Per Day:  1       Frequency:  Two Times a day  Entered: Jun 19 2023  2:18PM  

## 2023-06-26 NOTE — PROGRESS NOTE ADULT - PROBLEM SELECTOR PROBLEM 1
Alcohol use with withdrawal

## 2023-06-26 NOTE — PHYSICAL THERAPY INITIAL EVALUATION ADULT - PERTINENT HX OF CURRENT PROBLEM, REHAB EVAL
43 yr old male with a pmh of alcohol dependence here with tremors, resolved, PE non-focal EEG right posterior temporal slowing CTH no acute finding.  Impression: etoh withdrawal  Patient states he has a hx of seizures, unrelated to etoh, can consider Keppra 500 mg BID  MRI brain epilepsy protocol here or as an outpt.

## 2023-06-26 NOTE — PROGRESS NOTE ADULT - PROBLEM SELECTOR PROBLEM 7
MARTY (acute kidney injury)
MARTY (acute kidney injury)
Hypophosphatemia
MARTY (acute kidney injury)
MARTY (acute kidney injury)
Hypophosphatemia
MARTY (acute kidney injury)

## 2023-06-26 NOTE — CONSULT NOTE ADULT - SUBJECTIVE AND OBJECTIVE BOX
Neurology consult    SYDNEE GREEN43yMale     Patient is a 43y old  Male who presents with a chief complaint of alcohol withdrawal, MARTY (25 Jun 2023 10:04)      HPI:  43 yr old male with a pmh of alcohol dependence recently presents per ED with tremors with his last drink being 4 days ago. Per triage note pt reports a fall last night with head injury. 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   History partially limited as pt received benzo's prior to questioning and requires redirection frequently.  Denies  headache, dizziness, chest pain, palpitations, SOB, abdominal pain, joint pain, diarrhea/constipation, urinary symptoms.   Vitals: T 99.3, , /99, RR 16 satting 100% RA (17 Jun 2023 20:57)        MEDICATIONS    acetaminophen     Tablet .. 650 milliGRAM(s) Oral every 6 hours PRN  aluminum hydroxide/magnesium hydroxide/simethicone Suspension 30 milliLiter(s) Oral every 4 hours PRN  cloNIDine 0.3 milliGRAM(s) Oral every 8 hours  colchicine 0.6 milliGRAM(s) Oral daily  enoxaparin Injectable 40 milliGRAM(s) SubCutaneous every 24 hours  folic acid 1 milliGRAM(s) Oral daily  hydrALAZINE Injectable 10 milliGRAM(s) IV Push every 6 hours PRN  ibuprofen  Tablet. 400 milliGRAM(s) Oral every 8 hours PRN  levETIRAcetam 500 milliGRAM(s) Oral two times a day  LORazepam   Injectable 1 milliGRAM(s) IV Push once  losartan 100 milliGRAM(s) Oral daily  melatonin 3 milliGRAM(s) Oral at bedtime PRN  metoprolol succinate ER 50 milliGRAM(s) Oral daily  morphine  - Injectable 2 milliGRAM(s) IV Push every 6 hours PRN  multivitamin 1 Tablet(s) Oral daily  ondansetron Injectable 4 milliGRAM(s) IV Push every 8 hours PRN  predniSONE   Tablet 40 milliGRAM(s) Oral daily      PMH: Seizures    Hypertension         PSH: No significant past surgical history        Family history: No history of dementia, strokes, or seizures   FAMILY HISTORY:  No pertinent family history in first degree relatives        SOCIAL HISTORY:  No history of tobacco or alcohol use     Allergies    No Known Allergies    Intolerances            Vital Signs Last 24 Hrs  T(C): 37.1 (26 Jun 2023 04:34), Max: 37.6 (25 Jun 2023 16:13)  T(F): 98.7 (26 Jun 2023 04:34), Max: 99.6 (25 Jun 2023 16:13)  HR: 79 (26 Jun 2023 04:34) (74 - 94)  BP: 167/96 (26 Jun 2023 04:34) (138/88 - 167/96)  BP(mean): --  RR: 18 (26 Jun 2023 04:34) (18 - 18)  SpO2: 98% (26 Jun 2023 04:34) (98% - 98%)    Parameters below as of 26 Jun 2023 04:34  Patient On (Oxygen Delivery Method): room air          On Neurological Examination:    Mental Status - Patient is alert, awake, oriented X3. fluent, names, no dysarthria no aphasia Follows commands well and able to answer questions appropriately. Mood and affect  normal    Cranial Nerves - PERRL, EOMI, VFF, V1-V3 intact, no gross facial asymmetry, tongue/uvula midline    Motor Exam -   no drift  Sensory    Intact to light touch and pinprick bilaterally    Coord: FTN intact bilaterally     Gait -  normal                Hemoglobin A1C:             RADIOLOGY  < from: CT Head No Cont (06.17.23 @ 18:05) >    IMPRESSION:  No evidence of acute intracranial injury.. Diffuse brain   volume loss greater than typical for age. Patient motion limited scan.   Consider repeat evaluation once tolerated by the patient    --- End of Report ---      < end of copied text >  Clinical Impression:    Evidence for right posterior temporal cerebral dysfunction  There were no epileptiform abnormalities recorded.          -------------------------------------------------------------------------------------------------------  [facility] EEG Reading Room Ph#: [phone]  Epilepsy Answering Service after 5PM and before 8:30AM: Ph#: [phone]    Roel Caceres M.D, epilepsy fellow

## 2023-06-26 NOTE — PROGRESS NOTE ADULT - PROBLEM SELECTOR PLAN 5
Chronic moderate exacerbation   138/99  Continue to monitor
elevated this am and uncontrolled. repeating blood pressure may need adjustments in medication for exacerbation of blood pressure.
Chronic moderate exacerbation   138/99  Continue to monitor
elevated this am and uncontrolled. repeating blood pressure may need adjustments in medication for exacerbation of blood pressure.  6/23/2023 - continue to titrate meds as needed for optimal bp control  6/24/2023-continue to titrate meds and add losartan  6/25/2023- bp has been difficult to manage. added toprol xl ( given chf- findings on echo) continue with losartan which has been increased  and continue with clonidine  6/26/2023 BP better ,
Chronic moderate exacerbation   138/99  Continue to monitor
elevated this am and uncontrolled. repeating blood pressure may need adjustments in medication for exacerbation of blood pressure.  6/23/2023 - continue to titrate meds as needed for optimal bp control  6/24/2023-continue to titrate meds and add losartan  6/25/2023- bp has been difficult to manage. added toprol xl ( given chf- findings on echo) continue with losartan which has been increased  and continue with clonidine
Chronic moderate exacerbation   138/99  Continue to monitor
elevated this am and uncontrolled. repeating blood pressure may need adjustments in medication for exacerbation of blood pressure.  6/23/2023 - continue to titrate meds as needed for optimal bp control
elevated this am and uncontrolled. repeating blood pressure may need adjustments in medication for exacerbation of blood pressure.  6/23/2023 - continue to titrate meds as needed for optimal bp control  6/24/2023-continue to titrate meds and add losartan

## 2023-06-27 VITALS
OXYGEN SATURATION: 97 % | SYSTOLIC BLOOD PRESSURE: 111 MMHG | HEART RATE: 87 BPM | TEMPERATURE: 99 F | DIASTOLIC BLOOD PRESSURE: 77 MMHG

## 2023-06-27 PROCEDURE — 99233 SBSQ HOSP IP/OBS HIGH 50: CPT

## 2023-06-27 PROCEDURE — 99239 HOSP IP/OBS DSCHRG MGMT >30: CPT

## 2023-06-27 RX ORDER — LEVETIRACETAM 250 MG/1
1 TABLET, FILM COATED ORAL
Qty: 60 | Refills: 0
Start: 2023-06-27 | End: 2023-07-26

## 2023-06-27 RX ORDER — COLCHICINE 0.6 MG
1 TABLET ORAL
Qty: 30 | Refills: 0
Start: 2023-06-27 | End: 2023-07-26

## 2023-06-27 RX ORDER — METOPROLOL TARTRATE 50 MG
1 TABLET ORAL
Qty: 30 | Refills: 0
Start: 2023-06-27 | End: 2023-07-26

## 2023-06-27 RX ORDER — LOSARTAN POTASSIUM 100 MG/1
1 TABLET, FILM COATED ORAL
Qty: 30 | Refills: 0
Start: 2023-06-27 | End: 2023-07-26

## 2023-06-27 RX ADMIN — Medication 50 MILLIGRAM(S): at 05:51

## 2023-06-27 RX ADMIN — ENOXAPARIN SODIUM 40 MILLIGRAM(S): 100 INJECTION SUBCUTANEOUS at 05:45

## 2023-06-27 RX ADMIN — Medication 0.6 MILLIGRAM(S): at 12:52

## 2023-06-27 RX ADMIN — Medication 1 TABLET(S): at 12:53

## 2023-06-27 RX ADMIN — LEVETIRACETAM 500 MILLIGRAM(S): 250 TABLET, FILM COATED ORAL at 05:43

## 2023-06-27 RX ADMIN — Medication 0.3 MILLIGRAM(S): at 05:44

## 2023-06-27 RX ADMIN — Medication 1 MILLIGRAM(S): at 12:52

## 2023-06-27 RX ADMIN — LOSARTAN POTASSIUM 100 MILLIGRAM(S): 100 TABLET, FILM COATED ORAL at 05:43

## 2023-06-27 RX ADMIN — Medication 40 MILLIGRAM(S): at 05:43

## 2023-06-27 NOTE — PROGRESS NOTE ADULT - SUBJECTIVE AND OBJECTIVE BOX
Patient seen and examined this am. No new events    MEDICATIONS:    acetaminophen     Tablet .. 650 milliGRAM(s) Oral every 6 hours PRN  aluminum hydroxide/magnesium hydroxide/simethicone Suspension 30 milliLiter(s) Oral every 4 hours PRN  cloNIDine 0.3 milliGRAM(s) Oral every 8 hours  colchicine 0.6 milliGRAM(s) Oral daily  enoxaparin Injectable 40 milliGRAM(s) SubCutaneous every 24 hours  folic acid 1 milliGRAM(s) Oral daily  hydrALAZINE Injectable 10 milliGRAM(s) IV Push every 6 hours PRN  ibuprofen  Tablet. 400 milliGRAM(s) Oral every 8 hours PRN  levETIRAcetam 500 milliGRAM(s) Oral two times a day  losartan 100 milliGRAM(s) Oral daily  melatonin 3 milliGRAM(s) Oral at bedtime PRN  metoprolol succinate ER 50 milliGRAM(s) Oral daily  morphine  - Injectable 2 milliGRAM(s) IV Push every 6 hours PRN  multivitamin 1 Tablet(s) Oral daily  ondansetron Injectable 4 milliGRAM(s) IV Push every 8 hours PRN  predniSONE   Tablet 40 milliGRAM(s) Oral daily      LABS:            CAPILLARY BLOOD GLUCOSE            I&O's Summary    26 Jun 2023 07:01  -  27 Jun 2023 07:00  --------------------------------------------------------  IN: 320 mL / OUT: 0 mL / NET: 320 mL      Vital Signs Last 24 Hrs  T(C): 37.1 (27 Jun 2023 05:04), Max: 37.1 (26 Jun 2023 10:25)  T(F): 98.7 (27 Jun 2023 05:04), Max: 98.8 (26 Jun 2023 10:25)  HR: 84 (27 Jun 2023 05:04) (84 - 100)  BP: 129/77 (27 Jun 2023 05:04) (122/71 - 136/93)  BP(mean): --  RR: 18 (27 Jun 2023 05:04) (18 - 18)  SpO2: 97% (27 Jun 2023 05:04) (96% - 100%)    Parameters below as of 27 Jun 2023 05:04  Patient On (Oxygen Delivery Method): room air        On Neurological Examination:    Mental Status - Patient is alert, awake, oriented X3. fluent, names, no dysarthria no aphasia Follows commands well and able to answer questions appropriately. Mood and affect  normal    Cranial Nerves - PERRL, EOMI, VFF, V1-V3 intact, no gross facial asymmetry, tongue/uvula midline    Motor Exam -   no drift  Sensory    Intact to light touch and pinprick bilaterally    Coord: FTN intact bilaterally     Gait -  normal                Hemoglobin A1C:             RADIOLOGY  < from: CT Head No Cont (06.17.23 @ 18:05) >    IMPRESSION:  No evidence of acute intracranial injury.. Diffuse brain   volume loss greater than typical for age. Patient motion limited scan.   Consider repeat evaluation once tolerated by the patient    --- End of Report ---      < end of copied text >  Clinical Impression:    Evidence for right posterior temporal cerebral dysfunction  There were no epileptiform abnormalities recorded.          -------------------------------------------------------------------------------------------------------  [facility] EEG Reading Room Ph#: [phone]  Epilepsy Answering Service after 5PM and before 8:30AM: Ph#: [phone]    Roel Caceres M.D, epilepsy fellow

## 2023-06-27 NOTE — DISCHARGE NOTE PROVIDER - ATTENDING DISCHARGE PHYSICAL EXAMINATION:
PHYSICAL EXAM:  GENERAL: NAD, well-groomed, well-developed  HEAD:  Atraumatic, Normocephalic  EYES: EOMI, conjunctiva and sclera clear  ENMT: No tonsillar erythema, exudates, or enlargement; Moist mucous membranes  NECK: Supple   NERVOUS SYSTEM:  Alert & Oriented X3, Good concentration; Moving all extremities  CHEST/LUNG:  No rales, rhonchi, wheezing, or rubs  HEART: Regular rate and rhythm; No murmurs, rubs, or gallops  ABDOMEN: Soft, Nontender, Nondistended; Bowel sounds present  EXTREMITIES:  2+ Peripheral Pulses, No clubbing, cyanosis, + swelling in hand joints,  more on right middle finger , left ankle improved warmth imrpoved swelling no tender,  left upper extremity improving swelling , improved right knee warm tender  SKIN: No rashes or lesions    LABS:

## 2023-06-27 NOTE — CHART NOTE - NSCHARTNOTEFT_GEN_A_CORE
Pt seen for follow-up & consult for assessment and education.  Pt reports good appetite; drinking Ensure Plus High Protein supplements. Discussed importance of following low Na diet, and emphasized good sources of kcal and protein. Pt receptive to diet information.   Pt with PMHx of HTN, seizures, ETOH dependence; admitted for ETOH withdrawal and MARTY. Also with swollen finger (suspect gout), L shoulder, elbow and wrist pain (improving), & presumed chronic diastolic CHF.     Factors impacting intake: [ ] none [ ] nausea  [ ] vomiting [ ] diarrhea [ ] constipation  [ ]chewing problems [ ] swallowing issues [x] other: change in mental status (appears to be improving overall)    Diet Prescription: Easy to Chew, Low Sodium + Ensure Plus High Protein x 2/day (700 kcal & 40 g protein)    Intake: % of meals; pt drinking and enjoying Ensure Plus High Protein supplement. Denies any current N/V/D/C. Tolerating/managing easy to chew consistency well with no issues.    Current Weight: No recent weights to trend  % Weight Change: N/A    1+ edema R & L leg; 2+ edema R hand, middle finger    Physical appearance: Pt with muscle wasting and fat depletion in following regions:    Pertinent Medications: MEDICATIONS  (STANDING):  cloNIDine 0.3 milliGRAM(s) Oral every 8 hours  colchicine 0.6 milliGRAM(s) Oral daily  enoxaparin Injectable 40 milliGRAM(s) SubCutaneous every 24 hours  folic acid 1 milliGRAM(s) Oral daily  levETIRAcetam 500 milliGRAM(s) Oral two times a day  losartan 100 milliGRAM(s) Oral daily  metoprolol succinate ER 50 milliGRAM(s) Oral daily  multivitamin 1 Tablet(s) Oral daily  predniSONE   Tablet 40 milliGRAM(s) Oral daily    MEDICATIONS  (PRN):  acetaminophen     Tablet .. 650 milliGRAM(s) Oral every 6 hours PRN Temp greater or equal to 38C (100.4F), Mild Pain (1 - 3)  aluminum hydroxide/magnesium hydroxide/simethicone Suspension 30 milliLiter(s) Oral every 4 hours PRN Dyspepsia  hydrALAZINE Injectable 10 milliGRAM(s) IV Push every 6 hours PRN sbp >180  ibuprofen  Tablet. 400 milliGRAM(s) Oral every 8 hours PRN Moderate Pain (4 - 6)  melatonin 3 milliGRAM(s) Oral at bedtime PRN Insomnia  morphine  - Injectable 2 milliGRAM(s) IV Push every 6 hours PRN Severe Pain (7 - 10)  ondansetron Injectable 4 milliGRAM(s) IV Push every 8 hours PRN Nausea and/or Vomiting    Pertinent Labs:  06-24 Phos 3.4 mg/dL    Skin: WDL    Estimated Needs:   [x] no change since previous assessment on 06/19  [ ] recalculated:     Previous Nutrition Diagnosis:   [x] Inadequate Protein Energy Intake  Etiology: Decreased ability to consume sufficient protein-energy related to ETOH withdrawal, MARTY  Signs/symptoms: PO intake 51-75% x 2 days during admission    Goal: Pt to meet >75% of protein-energy needs via meals/supplement - not consistently met    Nutrition Diagnosis is [ ] ongoing [ ] resolved [x] not applicable (see below)    New Nutrition Diagnosis: [x] Moderate malnutrition in context of chronic illness    Related to: Inadequate protein-energy intake related to hx of ETOH dependence    As evidenced by: Physical signs of moderate muscle wasting and fat depletion as noted    Goal: Pt to meet >75% of protein-energy needs via meals/supplement      Interventions:   Recommend  [x] Continue with current diet rx as ordered  [ ] Change Diet To:  [ ] Nutrition Supplement  [ ] Nutrition Support  [ ] Other:    Monitoring and Evaluation:   [ x ] PO intake [ x ] Tolerance to diet prescription [ x ] weights [ x ] labs[ x ] follow up per protocol  [ ] other: Pt seen for follow-up & consult for assessment and education.  Pt reports good appetite; drinking Ensure Plus High Protein supplements. Provided pt with low Na diet education, as well as discussed high kcal & high protein diet to improve malnutrition status. Pt receptive to diet information.   Pt with PMHx of HTN, seizures, ETOH dependence; admitted for ETOH withdrawal and MARTY. Also with swollen finger (suspect gout), L shoulder, elbow and wrist pain (improving), & presumed chronic diastolic CHF.     Factors impacting intake: [ ] none [ ] nausea  [ ] vomiting [ ] diarrhea [ ] constipation  [ ]chewing problems [ ] swallowing issues [x] other: change in mental status (improved)    Diet Prescription: Easy to Chew, Low Sodium + Ensure Plus High Protein x 2/day (700 kcal & 40 g protein) (since 06-19)    Intake: % of meals; pt drinking and enjoying Ensure Plus High Protein supplement. Denies any current N/V/D/C. Tolerating/managing easy to chew consistency well with no issues.    Current Weight: No recent weights to trend  % Weight Change: N/A    1+ edema R & L leg (06/26); 2+ edema R hand, middle finger    Physical appearance: Pt with muscle wasting and fat depletion in following regions: temples(mild), orbital(moderate), clavicle(mild), shoulder(moderate), triceps(mild - skin appears mostly taut, although arms are thin)    Pertinent Medications: MEDICATIONS  (STANDING):  cloNIDine 0.3 milliGRAM(s) Oral every 8 hours  colchicine 0.6 milliGRAM(s) Oral daily  enoxaparin Injectable 40 milliGRAM(s) SubCutaneous every 24 hours  folic acid 1 milliGRAM(s) Oral daily  levETIRAcetam 500 milliGRAM(s) Oral two times a day  losartan 100 milliGRAM(s) Oral daily  metoprolol succinate ER 50 milliGRAM(s) Oral daily  multivitamin 1 Tablet(s) Oral daily  predniSONE   Tablet 40 milliGRAM(s) Oral daily    MEDICATIONS  (PRN):  acetaminophen     Tablet .. 650 milliGRAM(s) Oral every 6 hours PRN Temp greater or equal to 38C (100.4F), Mild Pain (1 - 3)  aluminum hydroxide/magnesium hydroxide/simethicone Suspension 30 milliLiter(s) Oral every 4 hours PRN Dyspepsia  hydrALAZINE Injectable 10 milliGRAM(s) IV Push every 6 hours PRN sbp >180  ibuprofen  Tablet. 400 milliGRAM(s) Oral every 8 hours PRN Moderate Pain (4 - 6)  melatonin 3 milliGRAM(s) Oral at bedtime PRN Insomnia  morphine  - Injectable 2 milliGRAM(s) IV Push every 6 hours PRN Severe Pain (7 - 10)  ondansetron Injectable 4 milliGRAM(s) IV Push every 8 hours PRN Nausea and/or Vomiting    Pertinent Labs:  06-24 Phos 3.4 mg/dL    Skin: WDL    Estimated Needs:   [x] no change since previous assessment on 06/19  [ ] recalculated:     Previous Nutrition Diagnosis:   [x] Inadequate Protein Energy Intake  Etiology: Decreased ability to consume sufficient protein-energy related to ETOH withdrawal, MARTY  Signs/symptoms: PO intake 51-75% x 2 days during admission    Goal: Pt to meet >75% of protein-energy needs via meals/supplement - not consistently met    Nutrition Diagnosis is [ ] ongoing [ ] resolved [x] not applicable (see below)    New Nutrition Diagnosis: [x] Moderate malnutrition in context of chronic illness    Related to: Inadequate protein-energy intake related to hx of ETOH dependence    As evidenced by: Physical signs of mild-moderate muscle wasting and fat depletion as noted    Goal: Pt to meet >75% of protein-energy needs via meals/supplement; Promote wt gain 0.5-1 lb/week      Interventions:   Recommend  [x] Continue with current diet rx as ordered  [ ] Change Diet To:  [ ] Nutrition Supplement  [ ] Nutrition Support  [ ] Other:    Monitoring and Evaluation:   [ x ] PO intake [ x ] Tolerance to diet prescription [ x ] weights [ x ] labs[ x ] follow up per protocol  [ ] other: Hydroxyzine Counseling: Patient advised that the medication is sedating and not to drive a car after taking this medication.  Patient informed of potential adverse effects including but not limited to dry mouth, urinary retention, and blurry vision.  The patient verbalized understanding of the proper use and possible adverse effects of hydroxyzine.  All of the patient's questions and concerns were addressed.

## 2023-06-27 NOTE — PROGRESS NOTE ADULT - PROVIDER SPECIALTY LIST ADULT
Hospitalist
Orthopedics
Rheumatology
Neurology
Hospitalist

## 2023-06-27 NOTE — PROGRESS NOTE ADULT - SUBJECTIVE AND OBJECTIVE BOX
INTERVAL HPI/OVERNIGHT EVENTS:  Feeling much better.  Joint pains and RUE pain have virtually resolved.  Still w/ pain/swelling of his left middle finger.    MEDICATIONS  (STANDING):  cloNIDine 0.3 milliGRAM(s) Oral every 8 hours  colchicine 0.6 milliGRAM(s) Oral daily  enoxaparin Injectable 40 milliGRAM(s) SubCutaneous every 24 hours  folic acid 1 milliGRAM(s) Oral daily  levETIRAcetam 500 milliGRAM(s) Oral two times a day  losartan 100 milliGRAM(s) Oral daily  metoprolol succinate ER 50 milliGRAM(s) Oral daily  multivitamin 1 Tablet(s) Oral daily  predniSONE   Tablet 40 milliGRAM(s) Oral daily    MEDICATIONS  (PRN):  acetaminophen     Tablet .. 650 milliGRAM(s) Oral every 6 hours PRN Temp greater or equal to 38C (100.4F), Mild Pain (1 - 3)  aluminum hydroxide/magnesium hydroxide/simethicone Suspension 30 milliLiter(s) Oral every 4 hours PRN Dyspepsia  hydrALAZINE Injectable 10 milliGRAM(s) IV Push every 6 hours PRN sbp >180  ibuprofen  Tablet. 400 milliGRAM(s) Oral every 8 hours PRN Moderate Pain (4 - 6)  melatonin 3 milliGRAM(s) Oral at bedtime PRN Insomnia  morphine  - Injectable 2 milliGRAM(s) IV Push every 6 hours PRN Severe Pain (7 - 10)  ondansetron Injectable 4 milliGRAM(s) IV Push every 8 hours PRN Nausea and/or Vomiting      Allergies    No Known Allergies      Vital Signs Last 24 Hrs  T(C): 37.1 (27 Jun 2023 05:04), Max: 37.1 (26 Jun 2023 10:25)  T(F): 98.7 (27 Jun 2023 05:04), Max: 98.8 (26 Jun 2023 10:25)  HR: 84 (27 Jun 2023 05:04) (84 - 100)  BP: 129/77 (27 Jun 2023 05:04) (122/71 - 136/93)  BP(mean): --  RR: 18 (27 Jun 2023 05:04) (18 - 18)  SpO2: 97% (27 Jun 2023 05:04) (96% - 100%)    Parameters below as of 27 Jun 2023 05:04  Patient On (Oxygen Delivery Method): room air        PHYSICAL EXAM:  General :  NAD  HEENT--  no oral ulcers  Nodes-- no  LAD  Lungs--  CTA B/L  Heart--  RRR, nlS1 &S2 normal;   Abdomen--  soft, NT, ND +BS  Skin:  no rashes  Musculoskeletal exam:  (+)swelling/tenderness of left 3rd finger;  rest of joints w/ no synovitis, normal ROM          LABS:                  RADIOLOGY & ADDITIONAL TESTS:  < from: MR Hand No Cont, Right (06.26.23 @ 15:29) >  MR HAND RT   ORDERED BY: EDOUARD LITTLEJOHN     PROCEDURE DATE:  06/26/2023          INTERPRETATION:  RIGHT HAND MRI    CLINICAL INDICATION: Finger swelling following fall injury.    COMPARISON: Radiographs dated 6/22/2023.    TECHNIQUE: Multiplanar, multisequence MRI was obtained of the right hand.    FINDINGS:  Overall moderate image degradation secondary to patient motion artifact.   Due to technical malfunction only axial T1/STIR and coronal T1 sequences   were successfully obtained. Within these limitations:    OSSEOUS: No acute displaced fracture, confluent marrow replacing   osteomyelitis, osteonecrosis, or aggressive neoplasm.    SYNOVIUM: No effusions.    TENDONS: Subacute grade 2 strain of the extensor mechanismof the middle   finger including at least partial-thickness but incomplete width tear of   the radial sided lateral band at the level of the PIP joints. Distal   insertions of the central and terminal bands of the extensor mechanism of   the middle finger remain intact. Small-volume reactive fluid within the   distal middle finger extensor tendon sheath .    Chronic grade 2 strain of the ulnar-sided slip of the flexor digitorum   profundus of the pinky finger at the level of the proximal phalanx.  Visualized flexor and extensor tendons are otherwise intact were well   visualized.    GENERAL: No mass lesion or drainable encapsulated fluid collection.    IMPRESSION:    1.  Overall moderate image degradation secondary to patient motion   artifact. Due to technical malfunction only axial T1/STIR and coronal T1   sequences were successfully obtained. Within these limitations:  2.  Subacute grade 2 strain of right middle finger extensor mechanism   including at least partial-thickness but incomplete width tear of the   radial sided lateral band at the PIP. Intact distal insertions of the   central and terminal bands. Correlate with site of maximal pain and   consider follow-up repeat imaging as clinically indicated.    --- End of Report ---      < end of copied text >

## 2023-06-27 NOTE — DISCHARGE NOTE PROVIDER - DETAILS OF MALNUTRITION DIAGNOSIS/DIAGNOSES
This patient has been assessed with a concern for Malnutrition and was treated during this hospitalization for the following Nutrition diagnosis/diagnoses:     -  06/19/2023: Underweight (BMI < 19)

## 2023-06-27 NOTE — DISCHARGE NOTE NURSING/CASE MANAGEMENT/SOCIAL WORK - NSDCVIVACCINE_GEN_ALL_CORE_FT
Tdap; 28-May-2022 20:10; Jefferson Dougherty (RN); [employer]; [id number] (Exp. Date: 18-Jan-2024); IntraMuscular; Deltoid Left.; 0.5 milliLiter(s); VIS (VIS Published: 09-May-2013, VIS Presented: 28-May-2022);

## 2023-06-27 NOTE — DIETITIAN NUTRITION RISK NOTIFICATION - TREATMENT: THE FOLLOWING DIET HAS BEEN RECOMMENDED
Diet, Easy to Chew:   Low Sodium  Supplement Feeding Modality:  Oral  Ensure Plus High Protein Cans or Servings Per Day:  1       Frequency:  Two Times a day (06-19-23 @ 14:19) [Pending Verification By Attending]  Diet, Regular (06-17-23 @ 18:35) [Active]      
Diet, Easy to Chew:   Low Sodium  Supplement Feeding Modality:  Oral  Ensure Plus High Protein Cans or Servings Per Day:  1       Frequency:  Two Times a day (06-19-23 @ 14:19) [Active]

## 2023-06-27 NOTE — DISCHARGE NOTE PROVIDER - NSDCCPCAREPLAN_GEN_ALL_CORE_FT
PRINCIPAL DISCHARGE DIAGNOSIS  Diagnosis: Alcohol withdrawal  Assessment and Plan of Treatment:       SECONDARY DISCHARGE DIAGNOSES  Diagnosis: Swollen finger  Assessment and Plan of Treatment:     Diagnosis: Polyarticular gout  Assessment and Plan of Treatment:     Diagnosis: Seizure  Assessment and Plan of Treatment:     Diagnosis: MARTY (acute kidney injury)  Assessment and Plan of Treatment:     Diagnosis: Left arm pain  Assessment and Plan of Treatment:     Diagnosis: HTN (hypertension), malignant  Assessment and Plan of Treatment:     Diagnosis: Alcohol withdrawal  Assessment and Plan of Treatment:

## 2023-06-27 NOTE — DISCHARGE NOTE PROVIDER - NSDCMRMEDTOKEN_GEN_ALL_CORE_FT
cloNIDine 0.3 mg oral tablet: 1 tab(s) orally every 8 hours  levETIRAcetam 500 mg oral tablet: 1 tab(s) orally 2 times a day MDD: 2  losartan 100 mg oral tablet: 1 tab(s) orally once a day  metoprolol succinate 50 mg oral tablet, extended release: 1 tab(s) orally once a day  predniSONE 10 mg oral tablet: 3 tab(s) orally once a day  predniSONE 10 mg oral tablet: 2 tab(s) orally once a day begin after complete   30mg  predniSONE 10 mg oral tablet: 1 tab(s) orally once a day begin after complete 20mg

## 2023-06-27 NOTE — DISCHARGE NOTE NURSING/CASE MANAGEMENT/SOCIAL WORK - PATIENT PORTAL LINK FT
You can access the FollowMyHealth Patient Portal offered by [facility] by registering at the following website: [email or web address]. By joining [facility]’s FollowMyHealth portal, you will also be able to view your health information using other applications (apps) compatible with our system.

## 2023-06-27 NOTE — DISCHARGE NOTE PROVIDER - CARE PROVIDER_API CALL
Regino Teague [place]  Orthopaedic Surgery  [address]  [place] [id number]  Phone: [phone]  Fax: [phone]  Follow Up Time:     Jimbo Zarate  Rheumatology  [address] [place] [address]  Phone: [phone]  Fax: [phone]  Follow Up Time:     your primary care doctor,   Phone: (   )    -  Fax: (   )    -  Follow Up Time:     Helio Upton)  Neurology; Neurophysiology  [address]  [place] [address]  Phone: [phone]  Fax: [phone]  Follow Up Time:

## 2023-06-27 NOTE — PROGRESS NOTE ADULT - REASON FOR ADMISSION
alcohol withdrawal, MARTY

## 2023-06-27 NOTE — DISCHARGE NOTE PROVIDER - PROVIDER TOKENS
PROVIDER:[TOKEN:[[id number]]],PROVIDER:[TOKEN:[[id number]]],FREE:[LAST:[your primary care doctor],PHONE:[(   )    -],FAX:[(   )    -]],PROVIDER:[TOKEN:[[id number]]]

## 2023-06-27 NOTE — DISCHARGE NOTE PROVIDER - HOSPITAL COURSE
Assessment and Plan:   · Assessment	  43 yr old male with a pmhx of alcohol dependence recently presents per ED with tremors with his last drink being 4 days ago. Per triage note pt reports a fall last night with head injury. Admitted for alcohol withdrawal and marty       Nutritional Assessment:  · Nutritional Assessment	This patient has been assessed with a concern for Malnutrition and has been determined to have a diagnosis/diagnoses of Underweight (BMI < 19).    This patient is being managed with:   Diet Easy to Chew-  Low Sodium  Supplement Feeding Modality:  Oral  Ensure Plus High Protein Cans or Servings Per Day:  1       Frequency:  Two Times a day  Entered: Jun 19 2023  2:18PM     Problem/Plan - 1:  ·  Problem: Alcohol use with withdrawal.   ·  Plan: ciwa at 1.     Problem/Plan - 2:  ·  Problem: Swollen finger.   ·  Plan: Reports injury at work  Xray R hand ordered f/u   suspect gout f/u uric acid , esr ,crp  6/22/2023 uric acid wnl   esr and crp elevated  f/u xray r/o fracture    may need ct scan and hand surgeon    appreciate ortho consult  6/23/2023 await rheum eval/consult   case also d/w dr. lee ( hand surgeon) rec mri hand   will consult ID consult   continue with colchicine , consider low dose steroids , blood cx negative. get echo    6/24/2023 echo as above -blood cx  ngtd--   improving  6/25/2023 improving swelling in finger- suspect gout continue colchicine and steroids.  6/27/2023 mri hand shows no fracture , no abcsesss  but a strain . case d/w / laz via phone reccs for rheum to f/u . seen by [facility] rec for hand surgeon eval will refer to dr. saravia as outpt . this is  chronic  and likely sustained from trauma a few months ago when patient admits to punching a wall.    regarding gout will taper steroids per rheum recccs     Problem/Plan - 3:  ·  Problem: Left arm pain.   ·  Plan: Pt complaint L shoulder, elbow and wrist pain. Fingers are swollen  F/u CK to r/o rhabdo-->94  F/u doppler upper extremities b/l to r/o DVT--> superficial thrombus left upper extremity . elevate arm supportive care  F/u Xray L wrist, elbow and shoulder to r/o bone pathology   check uric acid, f/u esr crp for suspicion of gout  my colleague added ibuprofen PRN for moderate pain  6/22/2023 - uric acid wnl, f/u xrays   consulted rheum- as above- x rays as above   improving  6/25/2023 improving.     Problem/Plan - 4:  ·  Problem: Seizures.   ·  Plan: per my colleague's documentation "Chronic unstable  questionable seizure yesterday   Medrec in AM - pt gave pharmacy "[facility] in [address]", called but they said only ibuprofen, rosuvastatin 20mg daily and vit D2, last  was 2022. still unclear.   CIWA protocol as above   Seizure precautions"    6/21/2023 will get EEG   ct head noted  6/23/2023 await eeg  6/25/2023 eeg as above will obtain mri brain to rule out structural lesion   pt is now most alert  and states he has had seizures in the past but has not been on seizure meds . last one august last year . unclear  why no meds.will obtain neurology consult  6/26/2023 f/u mri brin start keppra.  6/27/2023 mri brain no structural lesion  no cva- f/u neurology no driving advised      Problem/Plan - 5:  ·  Problem: Benign essential HTN.   ·  Plan: elevated this am and uncontrolled. repeating blood pressure may need adjustments in medication for exacerbation of blood pressure.  6/23/2023 - continue to titrate meds as needed for optimal bp control  6/24/2023-continue to titrate meds and add losartan  6/25/2023- bp has been difficult to manage. added toprol xl ( given chf- findings on echo) continue with losartan which has been increased  and continue with clonidine  6/26/2023 BP better ,  6/27/2023 bp better this morning systolic 126     Problem/Plan - 6:  ·  Problem: MARTY (acute kidney injury).   ·  Plan: resolved.     Problem/Plan - 7:  ·  Problem: Hypophosphatemia.   ·  Plan: resolved.     Problem/Plan - 8:  ·  Problem: Diastolic CHF, chronic.   ·  Plan: presumed chronic  6/25/223 started on arb and beta blocker as discussed above under HTN.  6/26/2023 bp this morning at 126 systolic

## 2023-06-27 NOTE — PROGRESS NOTE ADULT - ASSESSMENT
43 year old male presents with severe diffuse pain (L>R), including multiple joints, though also throughout left hand and forearm, which appears to be multifactorial.  Joint pains most c/w inflammatory arthritis - possibly gout vs other crystal arthropathy, now resolved on steroids.  RUE pain most likely due to superficial venous thrombosis/thrombophlebitis of the left basilic vein.  MRI of the right 3rd finger revealed a subacute grade 2 strain of right middle finger extensor mechanism including at least partial-thickness but incomplete width tear of the radial sided lateral band at the PIP    - OK to D/C home from rheumatology standpoint.    - Would decrease prednisone to 30mg daily x 3 days, then taper by 10mg/day q3 days.    - Recommend ortho/hand for right 3rd finger findings (can be done as an outpatient).
43 yr old male presenting with alcohol withdrawal and burke
43 yr old male with a pmh of alcohol dependence recently presents per ED with tremors with his last drink being 4 days ago. Per triage note pt reports a fall last night with head injury. Admitted for alcohol withdrawal and burke
43 yr old male with a pmhx of alcohol dependence recently presents per ED with tremors with his last drink being 4 days ago. Per triage note pt reports a fall last night with head injury. Admitted for alcohol withdrawal and burke
43 yr old male with a pmh of alcohol dependence here with tremors, resolved, PE non-focal EEG right posterior temporal slowing CTH no acute finding    Impression: etoh withdrawal    On [facility]  MRI brain epilepsy protocol no acute findings  Can follow up with Neurology, Dr. Helio Upton at [phone]
43 yr old male with a pmhx of alcohol dependence recently presents per ED with tremors with his last drink being 4 days ago. Per triage note pt reports a fall last night with head injury. Admitted for alcohol withdrawal and burke
43 yr old male with a pmh of alcohol dependence recently presents per ED with tremors with his last drink being 4 days ago. Per triage note pt reports a fall last night with head injury. Admitted for alcohol withdrawal and burke
43 yr old male with a pmhx of alcohol dependence recently presents per ED with tremors with his last drink being 4 days ago. Per triage note pt reports a fall last night with head injury. Admitted for alcohol withdrawal and burke

## 2023-06-30 DIAGNOSIS — R44.1 VISUAL HALLUCINATIONS: ICD-10-CM

## 2023-06-30 DIAGNOSIS — N17.9 ACUTE KIDNEY FAILURE, UNSPECIFIED: ICD-10-CM

## 2023-06-30 DIAGNOSIS — E83.30 DISORDER OF PHOSPHORUS METABOLISM, UNSPECIFIED: ICD-10-CM

## 2023-06-30 DIAGNOSIS — M10.09 IDIOPATHIC GOUT, MULTIPLE SITES: ICD-10-CM

## 2023-06-30 DIAGNOSIS — I50.32 CHRONIC DIASTOLIC (CONGESTIVE) HEART FAILURE: ICD-10-CM

## 2023-06-30 DIAGNOSIS — G40.909 EPILEPSY, UNSPECIFIED, NOT INTRACTABLE, WITHOUT STATUS EPILEPTICUS: ICD-10-CM

## 2023-06-30 DIAGNOSIS — M25.522 PAIN IN LEFT ELBOW: ICD-10-CM

## 2023-06-30 DIAGNOSIS — I82.611 ACUTE EMBOLISM AND THROMBOSIS OF SUPERFICIAL VEINS OF RIGHT UPPER EXTREMITY: ICD-10-CM

## 2023-06-30 DIAGNOSIS — M25.512 PAIN IN LEFT SHOULDER: ICD-10-CM

## 2023-06-30 DIAGNOSIS — F10.239 ALCOHOL DEPENDENCE WITH WITHDRAWAL, UNSPECIFIED: ICD-10-CM

## 2023-06-30 DIAGNOSIS — R63.6 UNDERWEIGHT: ICD-10-CM

## 2023-06-30 DIAGNOSIS — I11.0 HYPERTENSIVE HEART DISEASE WITH HEART FAILURE: ICD-10-CM

## 2023-06-30 DIAGNOSIS — M25.532 PAIN IN LEFT WRIST: ICD-10-CM

## 2023-07-02 NOTE — ED ADULT TRIAGE NOTE - MEANS OF ARRIVAL
wheelchair Alert-The patient is alert, awake and responds to voice. The patient is oriented to time, place, and person. The triage nurse is able to obtain subjective information.

## 2023-12-14 NOTE — ED ADULT NURSE NOTE - PAIN: PRESENCE, MLM
Ms. Whitt comes in today for follow-up.  Injections have worked well in the past.  The patient would like to get a repeat injection today.      Imaging:  AP and lateral views of the left knee are ordered by myself and reviewed to evaluate the patient's complaint. The x-rays show end stage degenerative arthritis including bone on bone degeneration, malalignment, osteophyte and subchondral sclerosis.  The majority of the degenerative changes appear to involve the medial and patellofemoral compartments.  No significant changes compared to previous x-rays.    We discussed how losing weight may help her knee pain.  She says she has tried many different types of weight loss programs, but nothing seems to help.  She says her insurance will not cover any type of bariatric surgery or medications for weight loss.  I explained I am happy to continue treating her with cortisone injections.  The risks, benefits and alternatives were discussed and the patient consented.  Going forward, the patient will follow-up as needed.    Pratibha iPnzon, FARSHAD    12/14/2023      Large Joint Arthrocentesis: L knee  Date/Time: 12/14/2023 1:14 PM  Consent given by: patient  Site marked: site marked  Timeout: Immediately prior to procedure a time out was called to verify the correct patient, procedure, equipment, support staff and site/side marked as required   Supporting Documentation  Indications: pain   Procedure Details  Location: knee - L knee  Preparation: Patient was prepped and draped in the usual sterile fashion  Needle gauge: 21g.  Approach: anterolateral  Medications administered: 80 mg methylPREDNISolone acetate 80 MG/ML; 2 mL lidocaine PF 1% 1 %  Patient tolerance: patient tolerated the procedure well with no immediate complications        denies pain/discomfort

## 2024-03-18 NOTE — CHART NOTE - NSCHARTNOTEFT_GEN_A_CORE
Problem: GI Bleed  Goal: S/S of GI bleeding reduced/controlled  Outcome: Monitoring/Evaluating progress     Problem: GI Bleed  Goal: Verbalizes understanding of s/s of GI bleeding and treatment  Description: Document education using the patient education activity.   Outcome: Monitoring/Evaluating progress     Problem: VTE (Actual)  Goal: Patient maintains mobility and remains free from complications of VTE  Outcome: Monitoring/Evaluating progress     Problem: Breathing Pattern Ineffective  Goal: Breathing pattern demonstrates minimal apnea during sleep with appropriate use of airway pressure support devices  Outcome: Monitoring/Evaluating progress     Problem: Breathing Pattern Ineffective  Goal: Air exchange is effective, demonstrated by Sp02 sat of greater then or = 92% (or as ordered)  Outcome: Monitoring/Evaluating progress     
Pt seen for follow-up on Medical floor w / ETOH withdrawal ; Aspiration PNA ; HTN. S/P swallow eval w/ current recommendations.     Factors impacting intake: [x ] none [ ] nausea  [ ] vomiting [ ] diarrhea [ ] constipation  [ ]chewing problems [ ] swallowing issues  [ ] other:     Diet Prescription: 6/7 - Soft & Bite Sized  Intake: 75 - 100%. Pt without c/o at this time. Had ensure enlive 8 oz daily  (350 nga & 20 g pro/serving) this morning and would like to receive it.    Current Weight: 6/6 - 141.9 (64.4 kg) 5/30 - 129.14 (58.7 kg) no edema noted  % Weight Change - 8.9 % / 5.7 kg gain x 7 days.    Physical Appearance - 6/0 - Edema 1+ ( L & R) Arm, (R) Foot    Nutrition focused physical exam conducted ;   Subcutaneous fat loss: [mild ] Orbital fat pads region, [wdl ]Buccal fat region, [wdl ]Triceps region,  [ wdl]Ribs region.  Muscle wasting: [mild ]Temples region, [wdl ]Clavicle region, [wdl ]Shoulder region, [ wdl]Scapula region, [ wdl]Interosseous region,  [wdl ]thigh region, [wdl ]Calf region      Pertinent Medications: MEDICATIONS  (STANDING):  amLODIPine   Tablet 5 milliGRAM(s) Oral daily  ampicillin/sulbactam  IVPB      ampicillin/sulbactam  IVPB 3 Gram(s) IV Intermittent every 6 hours  chlorhexidine 2% Cloths 1 Application(s) Topical daily  cloNIDine 0.2 milliGRAM(s) Oral every 12 hours  enoxaparin Injectable 40 milliGRAM(s) SubCutaneous every 24 hours  folic acid 1 milliGRAM(s) Oral daily  multivitamin 1 Tablet(s) Oral daily  thiamine 100 milliGRAM(s) Oral daily    MEDICATIONS  (PRN):  acetaminophen     Tablet .. 650 milliGRAM(s) Oral every 6 hours PRN Temp greater or equal to 38.5C (101.3F)  hydrALAZINE Injectable 10 milliGRAM(s) IV Push every 6 hours PRN sbp > 160  ibuprofen  Tablet. 400 milliGRAM(s) Oral every 8 hours PRN Moderate Pain (4 - 6)  LORazepam     Tablet 2 milliGRAM(s) Oral every 2 hours PRN Symptom-triggered 2 point increase in CIWA-Ar  LORazepam   Injectable 2 milliGRAM(s) IV Push every 1 hour PRN Symptom-triggered: each CIWA -Ar score 8 or GREATER    Pertinent Labs: 06-09 Na138 mmol/L Glu 117 mg/dL<H> K+ 4.3 mmol/L Cr  0.99 mg/dL BUN 14 mg/dL 06-09 Phos 3.2 mg/dL 06-07 Alb 2.0 g/dL<L>06-07 ALT 41 U/L AST 52 U/L<H> Alkaline Phosphatase 148 U/L<H>     CAPILLARY BLOOD GLUCOSE        Skin: tear (R) forehead           L - hand knuckles      Estimated Needs:   [x] no change since previous assessment ( 6/2)  [ ] recalculated:     Previous Nutrition Diagnosis:   Nutrition Diagnosis	yes...  Nutrition Diagnostic Terminology #1	Inadequate Energy Intake  Etiology	alcohol withdrawal  Signs/Symptoms	<50% nutrition needs x 2 days  Goal/Expected Outcome	pt to consume >50-75% of meals & supplement - Goal Met      Nutrition Diagnosis is [ ] ongoing  [x ] resolving [ ] not applicable     New Nutrition Diagnosis: [x ] not applicable       Interventions:   Recommend  [ x ] Continue Soft & Bite Sized  [ ] Change Diet To:  [x ] Nutrition Supplement- provide ensure enlive 8 oz twice per day (700 nga & 40 g pro)   [ ] Nutrition Support  [ ] Other:     Monitoring and Evaluation:   [x ] PO intake [ x ] Tolerance to diet prescription [ x ] weights [ x ] labs[ x ] follow up per protocol  [ ] other:
ICU/CCU Transfer Note    Transfer from: ICU/ CCU  Transfer to: medicine   Accepting physician:  Case discussed with:       MICU COURSE:    41 yo M with HTN, ETOH abuse, seizures a/w seizures in the setting of ETOH withdrawal, transferred to ICU for worsening withdrawal symptoms and elevated CIWA.   Patient titrated off pecedex , phenobarbital being titrated.      ASSESSMENT & PLAN:   ===================      ##Neuro:  -continue phenobarbital taper   -haldol 5 mg Q 6 - taper     ##PULM:  -CXr -- bibasilar infiltrates   -nasla cannula 2 l PRN   -maintain O2 saturation greater than 92%     ##CV:  - hypertensive   - will increase clonidine to .2   -will add norvasc 2.5 daily   - continue hydralazine 10 mg PRN     ##GI:  -elevated Alk phos  -lipase WNL  -US abdomen   Cholelithiasis.  No biliary ductal dilatation.  Bilaterally increased renal cortical echogenicity compatible with medical renal disease.  No hydronephrosis.    ##:  -no rivera     ##RENAL:   -GREYSON, trend electrolytes and Cr; strict I and Os.     ##ENDO:  -monitor blood glucose     ##ID:   -new fever overnight   -Sepsis work up  -continue unasyn for possible aspiration     ##MSC:   Xray and RUE US to neg fracture of hand specifically first digit given swelling and pain with ROM and neg DVT of RUE.      CONSULTS: acetaminophen     Tablet .. 650 milliGRAM(s) Oral every 6 hours PRN  ampicillin/sulbactam  IVPB      ampicillin/sulbactam  IVPB 3 Gram(s) IV Intermittent every 6 hours  chlorhexidine 2% Cloths 1 Application(s) Topical daily  cloNIDine Patch 0.1 mG/24Hr(s) 1 patch Transdermal every 7 days  enoxaparin Injectable 40 milliGRAM(s) SubCutaneous every 24 hours  folic acid 1 milliGRAM(s) Oral daily  haloperidol    Injectable 5 milliGRAM(s) IntraMuscular every 8 hours  hydrALAZINE Injectable 10 milliGRAM(s) IV Push every 6 hours PRN  metoprolol tartrate 25 milliGRAM(s) Oral every 12 hours  multivitamin 1 Tablet(s) Oral daily  PHENobarbital Injectable 65 milliGRAM(s) IV Push every 8 hours  thiamine 100 milliGRAM(s) Oral daily        For Follow-Up:      VITALS  ========  Vital Signs Last 24 Hrs  T(C): 36.5 (06 Jun 2022 15:00), Max: 38.8 (06 Jun 2022 04:00)  T(F): 97.7 (06 Jun 2022 15:00), Max: 101.9 (06 Jun 2022 04:00)  HR: 91 (06 Jun 2022 16:00) (90 - 142)  BP: 156/102 (06 Jun 2022 16:00) (133/89 - 200/117)  BP(mean): 117 (06 Jun 2022 16:00) (102 - 151)  RR: 11 (06 Jun 2022 16:00) (11 - 36)  SpO2: 95% (06 Jun 2022 16:00) (90% - 100%)  I&O's Summary    05 Jun 2022 07:01  -  06 Jun 2022 07:00  --------------------------------------------------------  IN: 303.8 mL / OUT: 3500 mL / NET: -3196.2 mL    06 Jun 2022 07:01  -  06 Jun 2022 17:30  --------------------------------------------------------  IN: 0 mL / OUT: 1800 mL / NET: -1800 mL          LABS                                            10.1                  Neurophils% (auto):   x      (06-06 @ 02:55):    6.11 )-----------(263          Lymphocytes% (auto):  x                                             29.8                   Eosinphils% (auto):   x        Manual%: Neutrophils x    ; Lymphocytes x    ; Eosinophils x    ; Bands%: x    ; Blasts x                                    133    |  99     |  8                   Calcium: 8.8   / iCa: x      (06-06 @ 02:55)    ----------------------------<  127       Magnesium: 1.9                              4.5     |  25     |  0.93             Phosphorous: 3.7      TPro  6.6    /  Alb  2.1    /  TBili  0.2    /  DBili  x      /  AST  176    /  ALT  50     /  AlkPhos  164    06 Jun 2022 02:55

## 2024-07-16 ENCOUNTER — EMERGENCY (EMERGENCY)
Facility: HOSPITAL | Age: 44
LOS: 1 days | Discharge: HOME | End: 2024-07-16
Attending: EMERGENCY MEDICINE | Admitting: EMERGENCY MEDICINE
Payer: COMMERCIAL

## 2024-07-16 VITALS
DIASTOLIC BLOOD PRESSURE: 99 MMHG | OXYGEN SATURATION: 100 % | SYSTOLIC BLOOD PRESSURE: 134 MMHG | RESPIRATION RATE: 16 BRPM | TEMPERATURE: 98 F | HEART RATE: 80 BPM

## 2024-07-16 VITALS
HEIGHT: 66 IN | TEMPERATURE: 99 F | DIASTOLIC BLOOD PRESSURE: 109 MMHG | SYSTOLIC BLOOD PRESSURE: 152 MMHG | WEIGHT: 134.04 LBS | OXYGEN SATURATION: 100 % | RESPIRATION RATE: 17 BRPM | HEART RATE: 104 BPM

## 2024-07-16 LAB
ALBUMIN SERPL ELPH-MCNC: 4 G/DL — SIGNIFICANT CHANGE UP (ref 3.3–5)
ALP SERPL-CCNC: 141 U/L — HIGH (ref 40–120)
ALT FLD-CCNC: 54 U/L — HIGH (ref 4–41)
ANION GAP SERPL CALC-SCNC: 14 MMOL/L — SIGNIFICANT CHANGE UP (ref 7–14)
AST SERPL-CCNC: 154 U/L — HIGH (ref 4–40)
BILIRUB SERPL-MCNC: 0.7 MG/DL — SIGNIFICANT CHANGE UP (ref 0.2–1.2)
BUN SERPL-MCNC: 14 MG/DL — SIGNIFICANT CHANGE UP (ref 7–23)
CALCIUM SERPL-MCNC: 9.5 MG/DL — SIGNIFICANT CHANGE UP (ref 8.4–10.5)
CHLORIDE SERPL-SCNC: 95 MMOL/L — LOW (ref 98–107)
CO2 SERPL-SCNC: 25 MMOL/L — SIGNIFICANT CHANGE UP (ref 22–31)
CREAT SERPL-MCNC: 1.28 MG/DL — SIGNIFICANT CHANGE UP (ref 0.5–1.3)
CRP SERPL-MCNC: 76.3 MG/L — HIGH
EGFR: 72 ML/MIN/1.73M2 — SIGNIFICANT CHANGE UP
ERYTHROCYTE [SEDIMENTATION RATE] IN BLOOD: 25 MM/HR — HIGH (ref 1–15)
GLUCOSE SERPL-MCNC: 98 MG/DL — SIGNIFICANT CHANGE UP (ref 70–99)
HCT VFR BLD CALC: 31.2 % — LOW (ref 39–50)
HGB BLD-MCNC: 11.3 G/DL — LOW (ref 13–17)
MCHC RBC-ENTMCNC: 33.3 PG — SIGNIFICANT CHANGE UP (ref 27–34)
MCHC RBC-ENTMCNC: 36.2 GM/DL — HIGH (ref 32–36)
MCV RBC AUTO: 92 FL — SIGNIFICANT CHANGE UP (ref 80–100)
NRBC # BLD: 0 /100 WBCS — SIGNIFICANT CHANGE UP (ref 0–0)
NRBC # FLD: 0 K/UL — SIGNIFICANT CHANGE UP (ref 0–0)
PLATELET # BLD AUTO: 67 K/UL — LOW (ref 150–400)
POTASSIUM SERPL-MCNC: 4.3 MMOL/L — SIGNIFICANT CHANGE UP (ref 3.5–5.3)
POTASSIUM SERPL-SCNC: 4.3 MMOL/L — SIGNIFICANT CHANGE UP (ref 3.5–5.3)
PROT SERPL-MCNC: 7.4 G/DL — SIGNIFICANT CHANGE UP (ref 6–8.3)
RBC # BLD: 3.39 M/UL — LOW (ref 4.2–5.8)
RBC # FLD: 16.9 % — HIGH (ref 10.3–14.5)
SODIUM SERPL-SCNC: 134 MMOL/L — LOW (ref 135–145)
WBC # BLD: 4.8 K/UL — SIGNIFICANT CHANGE UP (ref 3.8–10.5)
WBC # FLD AUTO: 4.8 K/UL — SIGNIFICANT CHANGE UP (ref 3.8–10.5)

## 2024-07-16 RX ORDER — COLCHICINE 0.6 MG/1
0.6 TABLET ORAL ONCE
Refills: 0 | Status: COMPLETED | OUTPATIENT
Start: 2024-07-16 | End: 2024-07-16

## 2024-07-16 RX ORDER — PREDNISONE 10 MG/1
40 TABLET ORAL ONCE
Refills: 0 | Status: COMPLETED | OUTPATIENT
Start: 2024-07-16 | End: 2024-07-16

## 2024-07-16 RX ADMIN — COLCHICINE 0.6 MILLIGRAM(S): 0.6 TABLET ORAL at 16:36

## 2024-07-16 RX ADMIN — PREDNISONE 40 MILLIGRAM(S): 10 TABLET ORAL at 16:36

## 2024-07-16 RX ADMIN — Medication 600 MILLIGRAM(S): at 18:54

## 2024-07-16 RX ADMIN — COLCHICINE 0.6 MILLIGRAM(S): 0.6 TABLET ORAL at 17:56

## 2024-08-23 ENCOUNTER — EMERGENCY (EMERGENCY)
Facility: HOSPITAL | Age: 44
LOS: 0 days | Discharge: HOME | End: 2024-08-23
Attending: EMERGENCY MEDICINE
Payer: MEDICAID

## 2024-08-23 VITALS
OXYGEN SATURATION: 99 % | DIASTOLIC BLOOD PRESSURE: 97 MMHG | SYSTOLIC BLOOD PRESSURE: 133 MMHG | WEIGHT: 136.03 LBS | RESPIRATION RATE: 18 BRPM | HEART RATE: 88 BPM | HEIGHT: 66 IN | TEMPERATURE: 99 F

## 2024-08-23 VITALS
DIASTOLIC BLOOD PRESSURE: 96 MMHG | TEMPERATURE: 99 F | RESPIRATION RATE: 18 BRPM | HEART RATE: 78 BPM | SYSTOLIC BLOOD PRESSURE: 135 MMHG | OXYGEN SATURATION: 97 %

## 2024-08-23 DIAGNOSIS — M79.89 OTHER SPECIFIED SOFT TISSUE DISORDERS: ICD-10-CM

## 2024-08-23 DIAGNOSIS — M10.9 GOUT, UNSPECIFIED: ICD-10-CM

## 2024-08-23 PROCEDURE — 99283 EMERGENCY DEPT VISIT LOW MDM: CPT

## 2024-08-23 RX ORDER — INDOMETHACIN 50 MG
1 CAPSULE ORAL
Qty: 15 | Refills: 0
Start: 2024-08-23 | End: 2024-08-27

## 2024-08-23 RX ORDER — INDOMETHACIN 50 MG
50 CAPSULE ORAL ONCE
Refills: 0 | Status: COMPLETED | OUTPATIENT
Start: 2024-08-23 | End: 2024-08-23

## 2024-08-23 RX ORDER — PREDNISONE 10 MG/1
50 TABLET ORAL ONCE
Refills: 0 | Status: COMPLETED | OUTPATIENT
Start: 2024-08-23 | End: 2024-08-23

## 2024-08-23 RX ORDER — COLCHICINE 0.6 MG/1
1.2 TABLET, FILM COATED ORAL ONCE
Refills: 0 | Status: COMPLETED | OUTPATIENT
Start: 2024-08-23 | End: 2024-08-23

## 2024-08-23 RX ADMIN — PREDNISONE 50 MILLIGRAM(S): 10 TABLET ORAL at 20:08

## 2024-08-23 RX ADMIN — Medication 50 MILLIGRAM(S): at 20:07

## 2024-08-23 RX ADMIN — COLCHICINE 1.2 MILLIGRAM(S): 0.6 TABLET, FILM COATED ORAL at 20:07

## 2024-08-23 NOTE — ED PROVIDER NOTE - PATIENT PORTAL LINK FT
You can access the FollowMyHealth Patient Portal offered by [facility] by registering at the following website: [email or web address]. By joining [employer]’s FollowMyHealth portal, you will also be able to view your health information using other applications (apps) compatible with our system.

## 2024-08-23 NOTE — ED ADULT TRIAGE NOTE - CHIEF COMPLAINT QUOTE
pt c/o left foot pain and swelling and swelling of right hand 3rd finger and left 2nd finger for 2 weeks. history of gout , sickle cell disease and seizure.

## 2024-08-23 NOTE — ED PROVIDER NOTE - MUSCULOSKELETAL, MLM
Spine appears normal,the left foot is diffusely swollen and tender, R 3rd and L 2nd pip joints swollen and tender

## 2024-08-23 NOTE — ED ADULT NURSE NOTE - OBJECTIVE STATEMENT
Patient is 44 years old male, alert & oriented x 4. He is complaining of pain on his L foot, R middle finger, R pinky & R lower back & L shoulder.. PMHX: Sickle cell disease, HTN, Seizures, Polyarticular Gout. He takes Ibuprofen at home. (+) Swelling to Left foot, R middle finger and pinky. (+) Nausea, No vomiting.  (+) chest pain. (+) mild SOB, (+) dizziness. No recent travel. No recent cough or fever.

## 2024-08-23 NOTE — ED PROVIDER NOTE - NSICDXPASTMEDICALHX_GEN_ALL_CORE_FT
PAST MEDICAL HISTORY:  Epilepsy self reported    Gout     History of sickle cell disease self reported    Hypertension     Seizures

## 2024-08-23 NOTE — ED PROVIDER NOTE - CLINICAL SUMMARY MEDICAL DECISION MAKING FREE TEXT BOX
44M c/o pain & swelling of multiple joints  -h&p consistent with gout flare  -nsaids, steroids, colchicine  -refer to rheumatology (had referral from prior dc but never went)

## 2024-08-23 NOTE — ED PROVIDER NOTE - NSFOLLOWUPCLINICS_GEN_ALL_ED_FT
[facility] Rheumatology  Rheumatology  [address]  [place] [address]  Phone: [phone]  Fax:     [facility]  Rheumatology  [address] [place] [address]  Phone: [phone]  Fax: [phone]

## 2024-08-23 NOTE — ED ADULT NURSE NOTE - ED CARDIAC RATE
[de-identified] : \par EXAM:  CT SINUSES\par \par \par PROCEDURE DATE: 10/26/2021\par \par \par \par INTERPRETATION: CT EXAMINATION OF THE PARANASAL SINUSES\par \par CLINICAL INDICATION: Chronic sinusitis. Leukocytoclastic vasculitis.\par \par TECHNIQUE: CT examination of the paranasal sinuses was performed. These images were used to create axial, coronal and sagittal reformatted images through the paranasal sinuses. No intravenous contrast was administered. The images were reviewed in bone and soft-tissue windows.\par \par COMPARISON: None available\par \par FINDINGS:\par Sinocranial & sinoorbital junctions: The cribriform plates and fovea ethmoidalis are intact.\par \par Possible tiny foci of dehiscence of the right anterior lamina papyracea (2-114, 117). No associated infiltration of the extraconal orbital fat.\par \par Keros type III left and type II right olfactory fossae.\par \par Nasal septum and nasal cavity: Leftward anterior nasal septal deviation. Suggestion of prior right middle turbinectomy. Mucosal thickening noted throughout the nasal cavity. No discrete mass identified.\par \par Frontal sinuses, drainage pathways, and associated anatomic variants: Frontal sinuses are clear. Mild mucosal thickening noted along the bilateral frontal sinus outflow tracts. Agger nasi cells are present bilaterally.\par \par Ethmoid sinuses:Mucosal thickening throughout the ethmoid air cells, right side worse than left.\par \par Sphenoid sinuses, drainage pathways, and associated variants: Trace mucosal thickening involving the bilateral sphenoid sinuses. Suggestion of prior sphenoidotomies, which remain grossly patent. The carotid canals are covered by bone.\par \par Maxillary sinuses, drainage pathways, and associated variants: Bilateral antrostomies noted. Severe mucosal thickening involving the bilateral maxillary sinuses. Mild sinus wall thickening noted on the right, representing sequelae of chronic sinusitis.\par \par Other:\par Partially visualized intracranial structures: Normal\par Orbits: Normal\par Mastoid air cells: Normal\par \par \par IMPRESSION:\par Postsurgical changes as described above.\par \par Severe chronic inflammatory disease throughout the paranasal sinuses and their respective drainage pathways, most prominently involving the maxillary sinuses.\par \par Small foci of possible dehiscence of the right anterior lamina papyracea. No infiltration of the extraconal orbital fat.\par \par --- End of Report ---\par \par \par \par \par \par \par TIAN OBRIEN MD; Attending Radiologist\par This document has been electronically signed. Oct 28 2021 11:26AM normal

## 2024-08-24 PROBLEM — G40.909 EPILEPSY, UNSPECIFIED, NOT INTRACTABLE, WITHOUT STATUS EPILEPTICUS: Chronic | Status: ACTIVE | Noted: 2024-07-16

## 2024-08-24 PROBLEM — Z86.2 PERSONAL HISTORY OF DISEASES OF THE BLOOD AND BLOOD-FORMING ORGANS AND CERTAIN DISORDERS INVOLVING THE IMMUNE MECHANISM: Chronic | Status: ACTIVE | Noted: 2024-07-16

## 2024-08-24 RX ORDER — PREDNISONE 10 MG/1
1 TABLET ORAL
Qty: 4 | Refills: 0
Start: 2024-08-24 | End: 2024-08-27

## 2024-11-12 ENCOUNTER — EMERGENCY (EMERGENCY)
Facility: HOSPITAL | Age: 44
LOS: 0 days | Discharge: HOME | End: 2024-11-12
Attending: STUDENT IN AN ORGANIZED HEALTH CARE EDUCATION/TRAINING PROGRAM
Payer: MEDICAID

## 2024-11-12 VITALS
WEIGHT: 134.04 LBS | DIASTOLIC BLOOD PRESSURE: 103 MMHG | RESPIRATION RATE: 20 BRPM | HEIGHT: 66 IN | HEART RATE: 122 BPM | OXYGEN SATURATION: 100 % | SYSTOLIC BLOOD PRESSURE: 171 MMHG | TEMPERATURE: 99 F

## 2024-11-12 VITALS
RESPIRATION RATE: 20 BRPM | HEART RATE: 84 BPM | OXYGEN SATURATION: 99 % | TEMPERATURE: 99 F | DIASTOLIC BLOOD PRESSURE: 120 MMHG | SYSTOLIC BLOOD PRESSURE: 172 MMHG

## 2024-11-12 PROCEDURE — 99284 EMERGENCY DEPT VISIT MOD MDM: CPT

## 2024-11-12 RX ORDER — IBUPROFEN 200 MG
600 TABLET ORAL ONCE
Refills: 0 | Status: COMPLETED | OUTPATIENT
Start: 2024-11-12 | End: 2024-11-12

## 2024-11-12 RX ORDER — OXYCODONE HYDROCHLORIDE 30 MG/1
5 TABLET ORAL ONCE
Refills: 0 | Status: DISCONTINUED | OUTPATIENT
Start: 2024-11-12 | End: 2024-11-12

## 2024-11-12 RX ORDER — IBUPROFEN 200 MG
1 TABLET ORAL
Qty: 30 | Refills: 0
Start: 2024-11-12 | End: 2024-11-18

## 2024-11-12 RX ADMIN — OXYCODONE HYDROCHLORIDE 5 MILLIGRAM(S): 30 TABLET ORAL at 23:38

## 2024-11-12 RX ADMIN — Medication 0.6 MILLIGRAM(S): at 21:42

## 2024-11-12 RX ADMIN — Medication 50 MILLIGRAM(S): at 20:54

## 2024-11-12 RX ADMIN — Medication 600 MILLIGRAM(S): at 20:54

## 2024-11-12 RX ADMIN — Medication 1.2 MILLIGRAM(S): at 20:54

## 2024-11-12 NOTE — ED PROVIDER NOTE - NSFOLLOWUPCLINICS_GEN_ALL_ED_FT
[facility] Rheumatology  Rheumatology  [address]  [place] [address]  Phone: [phone]  Fax:     [facility]  Rheumatology  [address] [place] [address]  Phone: [phone]  Fax: [phone]

## 2024-11-12 NOTE — ED PROVIDER NOTE - PATIENT PORTAL LINK FT
You can access the FollowMyHealth Patient Portal offered by [facility] by registering at the following website: [email or web address]. By joining [employer]’s FollowMyHealth portal, you will also be able to view your health information using other applications (apps) compatible with our system.

## 2024-11-12 NOTE — ED PROVIDER NOTE - NSFOLLOWUPINSTRUCTIONS_ED_ALL_ED_FT
Follow up with rheumatology  Rest, drink plenty of fluids  Advance activity as tolerated  Continue all previously prescribed medications as directed  Follow up with your PMD - bring copies of your results  Return to the ER for severe pain, fevers, worsening symptoms, or other new or concerning symptoms  Take ibuprofen as needed for pain  Take prednisone daily as prescribed

## 2024-11-12 NOTE — ED ADULT NURSE NOTE - AS PAIN REST
6 (moderate pain) Patient wife reports that juanito has a productive cough, left lung congestion / wheezing, and has gained 2 pounds. She is requesting a chest xray if possible.

## 2024-11-12 NOTE — ED ADULT NURSE REASSESSMENT NOTE - NS ED NURSE REASSESS COMMENT FT1
Patient is AAOx4. Steady gait with cane, ambulatory. No IV inserted during this ED visit.  D/C per MD orders. D/C instructions provided and verbalizes understanding of medication regimen and follow up care. Educational material provided. Respirations equal and unlabored, with no acute distress noted at this time. Pt denies any pain or complaints at this time. OK to D/c per Dr Mancilla with elevated BP. Pt verbalized understanding to follow up with rheumatology and PCP.

## 2024-11-12 NOTE — ED ADULT NURSE NOTE - OBJECTIVE STATEMENT
Pt AAOx4. 44 year old male with past medical history of gout, sickle cell, seizure; presents to ED with complaint of joint pain that pt states is consistent with his gout flares.  States he was previously prescribed medications for this which ran out 2 weeks ago.  Thursday (4 days ago) began having pain in bilateral ankles and elbowst.  Began getting more swollen and painful and today and became much worse so came to ED.  No fevers.  Has not taken anything today.  In the past was on allopurinol but not recently. Respirations equal and unlabored. No acute distress noted at this time.

## 2024-11-12 NOTE — ED PROVIDER NOTE - PHYSICAL EXAMINATION
General appearance: Nontoxic appearing, conversant, afebrile    Eyes: anicteric sclerae, TASHI, EOMI   HENT: Atraumatic; oropharynx clear, MMM and no ulcerations, no pharyngeal erythema or exudate   Neck: Trachea midline; Full range of motion, supple   Pulm: normal respiratory effort and no intercostal retractions, normal work of breathing   Extremities: No peripheral edema, no gross deformities, FROM x4, 5/5 MS x4, gross sensation intact, b/l ankle, L hand swelling, no erythema, warmth, several joints with tophaceous appearance   Skin: Dry, normal temperature, turgor and texture; no rash   Psych: Appropriate affect, cooperative

## 2024-11-12 NOTE — ED PROVIDER NOTE - CLINICAL SUMMARY MEDICAL DECISION MAKING FREE TEXT BOX
45yo male with pmh gout, sickle cell, seizure, presenting with joint pain he reports is consistent with prior gout flares.  States he was previously prescribed medications for this which ran out 2 weeks ago.  Thursday (4 days ago) began having pain in b/l ankles, L hand which has felt c/w gout.  Began getting more swollen and painful and today got much worse so came to ED.  No fevers.  Has not taken anything today.  In the past was on allopurinol but not recently.  Afebrile, well appearing.  Multiple joints with gouty, tophaceous appearance.  Seems c/w gout.  Lower concern infectious without erythema, fevers, warmth.  Will medicate and reassess.

## 2024-11-12 NOTE — ED ADULT NURSE NOTE - NSFALLUNIVINTERV_ED_ALL_ED
Bed/Stretcher in lowest position, wheels locked, appropriate side rails in place/Call bell, personal items and telephone in reach/Instruct patient to call for assistance before getting out of bed/chair/stretcher/Non-slip footwear applied when patient is off stretcher/[place] to call system/Physically safe environment - no spills, clutter or unnecessary equipment/Purposeful proactive rounding/Room/bathroom lighting operational, light cord in reach

## 2024-11-13 DIAGNOSIS — Z91.148 PATIENT'S OTHER NONCOMPLIANCE WITH MEDICATION REGIMEN FOR OTHER REASON: ICD-10-CM

## 2024-11-13 DIAGNOSIS — T50.906A UNDERDOSING OF UNSPECIFIED DRUGS, MEDICAMENTS AND BIOLOGICAL SUBSTANCES, INITIAL ENCOUNTER: ICD-10-CM

## 2024-11-13 DIAGNOSIS — M25.571 PAIN IN RIGHT ANKLE AND JOINTS OF RIGHT FOOT: ICD-10-CM

## 2024-11-13 DIAGNOSIS — M10.9 GOUT, UNSPECIFIED: ICD-10-CM

## 2024-11-13 DIAGNOSIS — M25.572 PAIN IN LEFT ANKLE AND JOINTS OF LEFT FOOT: ICD-10-CM

## 2024-11-13 DIAGNOSIS — D57.1 SICKLE-CELL DISEASE WITHOUT CRISIS: ICD-10-CM

## 2024-11-13 DIAGNOSIS — Z86.69 PERSONAL HISTORY OF OTHER DISEASES OF THE NERVOUS SYSTEM AND SENSE ORGANS: ICD-10-CM

## 2025-01-16 NOTE — PATIENT PROFILE ADULT - SURGICAL SITE INCISION
01/08/2025 06:40 AM    ALKPHOS 65 01/08/2025 06:40 AM    AST 20 01/08/2025 06:40 AM    ALT 18 01/08/2025 06:40 AM       POC Tests: No results for input(s): \"POCGLU\", \"POCNA\", \"POCK\", \"POCCL\", \"POCBUN\", \"POCHEMO\", \"POCHCT\" in the last 72 hours.    Coags: No results found for: \"PROTIME\", \"INR\", \"APTT\"    HCG (If Applicable): No results found for: \"PREGTESTUR\", \"PREGSERUM\", \"HCG\", \"HCGQUANT\"     ABGs: No results found for: \"PHART\", \"PO2ART\", \"RWC3DHR\", \"JFT0PDJ\", \"BEART\", \"E4UHKDMD\"     Type & Screen (If Applicable):  No results found for: \"ABORH\", \"LABANTI\"    Drug/Infectious Status (If Applicable):  Lab Results   Component Value Date/Time    HEPCAB NONREACTIVE 06/05/2024 11:40 AM       COVID-19 Screening (If Applicable):   Lab Results   Component Value Date/Time    COVID19 Not Detected 06/28/2024 01:32 PM           Anesthesia Evaluation  Patient summary reviewed and Nursing notes reviewed  Airway: Mallampati: II  TM distance: >3 FB   Neck ROM: full  Mouth opening: > = 3 FB   Dental:          Pulmonary:Negative Pulmonary ROS and normal exam  breath sounds clear to auscultation                             Cardiovascular:  Exercise tolerance: good (>4 METS)  (+) hypertension:        Rhythm: regular  Rate: normal                 ROS comment: History of chest wall pain and palpitations     Neuro/Psych:   (+) psychiatric history:depression/anxiety             GI/Hepatic/Renal:   (+) GERD: no interval change, bowel prep          Endo/Other: Negative Endo/Other ROS                    Abdominal: normal exam      Abdomen: soft.      Vascular: negative vascular ROS.         Other Findings:             Anesthesia Plan      MAC     ASA 2       Induction: intravenous.      Anesthetic plan and risks discussed with patient.      Plan discussed with attending.                    Toni Smith, APRN - CRNA   1/16/2025            
no